# Patient Record
Sex: FEMALE | Race: WHITE | Employment: OTHER | URBAN - NONMETROPOLITAN AREA
[De-identification: names, ages, dates, MRNs, and addresses within clinical notes are randomized per-mention and may not be internally consistent; named-entity substitution may affect disease eponyms.]

---

## 2017-01-04 ENCOUNTER — COMMUNICATION - GICH (OUTPATIENT)
Dept: FAMILY MEDICINE | Facility: OTHER | Age: 62
End: 2017-01-04

## 2017-01-04 DIAGNOSIS — E11.9 TYPE 2 DIABETES MELLITUS WITHOUT COMPLICATIONS (H): ICD-10-CM

## 2017-01-05 ENCOUNTER — OFFICE VISIT - GICH (OUTPATIENT)
Dept: FAMILY MEDICINE | Facility: OTHER | Age: 62
End: 2017-01-05

## 2017-01-05 ENCOUNTER — HISTORY (OUTPATIENT)
Dept: FAMILY MEDICINE | Facility: OTHER | Age: 62
End: 2017-01-05

## 2017-01-05 DIAGNOSIS — J45.20 MILD INTERMITTENT ASTHMA, UNCOMPLICATED: ICD-10-CM

## 2017-01-05 DIAGNOSIS — G89.29 OTHER CHRONIC PAIN: ICD-10-CM

## 2017-01-05 DIAGNOSIS — M89.9 DISORDER OF BONE: ICD-10-CM

## 2017-01-05 DIAGNOSIS — E01.8 OTHER IODINE-DEFICIENCY RELATED THYROID DISORDERS AND ALLIED CONDITIONS: ICD-10-CM

## 2017-01-05 DIAGNOSIS — M25.552 PAIN IN LEFT HIP: ICD-10-CM

## 2017-01-05 DIAGNOSIS — S72.002P: ICD-10-CM

## 2017-01-05 DIAGNOSIS — E78.00 PURE HYPERCHOLESTEROLEMIA: ICD-10-CM

## 2017-01-05 DIAGNOSIS — Z86.79 PERSONAL HISTORY OF OTHER DISEASES OF THE CIRCULATORY SYSTEM (CODE): ICD-10-CM

## 2017-01-05 DIAGNOSIS — M94.9 DISORDER OF CARTILAGE: ICD-10-CM

## 2017-01-05 DIAGNOSIS — Z91.09 OTHER ALLERGY STATUS, OTHER THAN TO DRUGS AND BIOLOGICAL SUBSTANCES: ICD-10-CM

## 2017-01-05 DIAGNOSIS — E11.9 TYPE 2 DIABETES MELLITUS WITHOUT COMPLICATIONS (H): ICD-10-CM

## 2017-01-05 ASSESSMENT — PATIENT HEALTH QUESTIONNAIRE - PHQ9: SUM OF ALL RESPONSES TO PHQ QUESTIONS 1-9: 2

## 2017-05-03 ENCOUNTER — COMMUNICATION - GICH (OUTPATIENT)
Dept: FAMILY MEDICINE | Facility: OTHER | Age: 62
End: 2017-05-03

## 2017-06-21 ENCOUNTER — COMMUNICATION - GICH (OUTPATIENT)
Dept: FAMILY MEDICINE | Facility: OTHER | Age: 62
End: 2017-06-21

## 2017-07-01 ENCOUNTER — COMMUNICATION - GICH (OUTPATIENT)
Dept: FAMILY MEDICINE | Facility: OTHER | Age: 62
End: 2017-07-01

## 2017-07-03 ENCOUNTER — COMMUNICATION - GICH (OUTPATIENT)
Dept: FAMILY MEDICINE | Facility: OTHER | Age: 62
End: 2017-07-03

## 2017-07-03 DIAGNOSIS — E11.9 TYPE 2 DIABETES MELLITUS WITHOUT COMPLICATIONS (H): ICD-10-CM

## 2017-07-12 ENCOUNTER — COMMUNICATION - GICH (OUTPATIENT)
Dept: LAB | Facility: OTHER | Age: 62
End: 2017-07-12

## 2017-07-12 DIAGNOSIS — E11.9 TYPE 2 DIABETES MELLITUS WITHOUT COMPLICATIONS (H): ICD-10-CM

## 2017-07-12 DIAGNOSIS — E78.00 PURE HYPERCHOLESTEROLEMIA: ICD-10-CM

## 2017-07-12 DIAGNOSIS — E01.8 OTHER IODINE-DEFICIENCY RELATED THYROID DISORDERS AND ALLIED CONDITIONS: ICD-10-CM

## 2017-08-08 ENCOUNTER — AMBULATORY - GICH (OUTPATIENT)
Dept: SCHEDULING | Facility: OTHER | Age: 62
End: 2017-08-08

## 2017-08-18 ENCOUNTER — COMMUNICATION - GICH (OUTPATIENT)
Dept: FAMILY MEDICINE | Facility: OTHER | Age: 62
End: 2017-08-18

## 2017-08-18 DIAGNOSIS — E11.9 TYPE 2 DIABETES MELLITUS WITHOUT COMPLICATIONS (H): ICD-10-CM

## 2017-09-06 ENCOUNTER — COMMUNICATION - GICH (OUTPATIENT)
Dept: SURGERY | Facility: OTHER | Age: 62
End: 2017-09-06

## 2017-10-04 ENCOUNTER — COMMUNICATION - GICH (OUTPATIENT)
Dept: FAMILY MEDICINE | Facility: OTHER | Age: 62
End: 2017-10-04

## 2017-10-04 DIAGNOSIS — E11.9 TYPE 2 DIABETES MELLITUS WITHOUT COMPLICATIONS (H): ICD-10-CM

## 2017-10-05 ENCOUNTER — COMMUNICATION - GICH (OUTPATIENT)
Dept: FAMILY MEDICINE | Facility: OTHER | Age: 62
End: 2017-10-05

## 2017-10-05 DIAGNOSIS — R52 PAIN: ICD-10-CM

## 2017-10-31 ENCOUNTER — AMBULATORY - GICH (OUTPATIENT)
Dept: SCHEDULING | Facility: OTHER | Age: 62
End: 2017-10-31

## 2017-10-31 ENCOUNTER — HOSPITAL ENCOUNTER (OUTPATIENT)
Dept: LAB | Facility: OTHER | Age: 62
End: 2017-10-31

## 2017-11-21 ENCOUNTER — COMMUNICATION - GICH (OUTPATIENT)
Dept: FAMILY MEDICINE | Facility: OTHER | Age: 62
End: 2017-11-21

## 2017-11-21 DIAGNOSIS — E11.9 TYPE 2 DIABETES MELLITUS WITHOUT COMPLICATIONS (H): ICD-10-CM

## 2017-12-12 ENCOUNTER — HOSPITAL ENCOUNTER (OUTPATIENT)
Dept: LAB | Facility: OTHER | Age: 62
End: 2017-12-12

## 2017-12-12 ENCOUNTER — TRANSFERRED RECORDS (OUTPATIENT)
Dept: HEALTH INFORMATION MANAGEMENT | Facility: OTHER | Age: 62
End: 2017-12-12

## 2017-12-28 NOTE — TELEPHONE ENCOUNTER
Patient Information     Patient Name MRN Narda Cotton [id number] Female 1955      Telephone Encounter by Eliza Finn RN at 2017  8:49 AM     Author:  Eliza Finn RN Service:  (none) Author Type:  NURS- Registered Nurse     Filed:  2017  9:30 AM Encounter Date:  2017 Status:  Signed     :  Eliza Finn RN (NURS- Registered Nurse)            Pioglitazone 30mg, Take 1 tablet by mouth once daily.    Office visit in the past 12 months or per provider note.    Lab test requirements:  HgbA1c annually or per provider note.  HEMOGLOBIN A1C MONITORING (POCT)    Date Value   2016 8.5 % (H)   2013 9.9 % NGSP (H)       Patient was to follow up for diabetic check and A1C three months after office visit per notes from last exam on 17.  Last A1C 8.5 on 16. No future appointment scheduled at this time. Reminder letter sent 5-3-17. Patient had two follow up appointments scheduled in July which she cancelled.Does not have insurance and cost is a concern. Forwarded refill request to Mac Major MD for consideration.     Unable to complete prescription refill per RN Medication Refill Policy.................... Eliza Finn RN ....................  2017   9:06 AM      Hydroxyzine HCL 25mg, Take 1 tablet by mouth every 6 hours as needed for itching.    Office visit in the past 12 months.    Last visit with MAC MAJOR was on: 2017 in [facility]    Max refills 12 months from last office visit. Prescription refilled per RN Medication Refill Policy.................... Eliza Finn RN ....................  2017   9:19 AM
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
## 2017-12-28 NOTE — TELEPHONE ENCOUNTER
Patient Information     Patient Name Narda Marques [id number] Female 1955      Telephone Encounter by Mayela Strong at 7/3/2017  4:02 PM     Author:  Mayela Strong Service:  (none) Author Type:  (none)     Filed:  7/3/2017  4:10 PM Encounter Date:  2017 Status:  Signed     :  Mayela Strong            Patient notified of below information, Which upset the Patient who said this is bull shit & she is done with this clinic. She is going to cancel her appointment with Elsie Bunch MD.  Left clinic upset.   Mayela Strong LPN ..........7/3/2017 4:08 PM

## 2017-12-28 NOTE — TELEPHONE ENCOUNTER
Patient Information     Patient Name MRN Narda Cotton [id number] Female 1955      Telephone Encounter by Joellen Al at 2017  8:20 AM     Author:  Joellen Al Service:  (none) Author Type:  (none)     Filed:  2017  8:21 AM Encounter Date:  2017 Status:  Signed     :  Joellen Al            Patient is coming 17 for labs. Please place orders.

## 2017-12-28 NOTE — TELEPHONE ENCOUNTER
Patient Information     Patient Name MRN Narda Cotton [id number] Female 1955      Telephone Encounter by Dolly Holcomb RN at 10/6/2017  9:46 AM     Author:  Dolly Holcomb RN Service:  (none) Author Type:  NURS- Registered Nurse     Filed:  10/6/2017  9:53 AM Encounter Date:  10/5/2017 Status:  Signed     :  Dolly Holcomb RN (NURS- Registered Nurse)            This is a Refill request from: [facility]  Name of Medication: Gabapentin 400 mg   Quantity requested: 240  Last fill date: unable to locate this medication in current and or in medication history. Patient unavailable via phone to verify this refill request.   Due for refill: yes per pharmacy  Last visit with PCP:  Guero Sainz MD was on: 2017  Controlled Substance Agreement:  na   Diagnosis r/t this medication request: unsure     Unable to complete prescription refill per RN Medication Refill Policy.................... Dolly Holcomb RN ....................  10/6/2017   9:47 AM

## 2017-12-28 NOTE — TELEPHONE ENCOUNTER
Patient Information     Patient Name MRN Sex Narda Caldera [id number] Female 1955      Telephone Encounter by Elsie Bunch MD at 7/3/2017  2:52 PM     Author:  Elsie Bunch MD Service:  (none) Author Type:  Physician     Filed:  7/3/2017  2:53 PM Encounter Date:  2017 Status:  Signed     :  Elsie Bunch MD (Physician)            Medications filled at [facility] need to be refilled at [facility]. I don't have access to those records here. Cannot increase dose as this is a controlled medication. She should either follow up at [facility]. If she has insurance, would recommend follow up with Dr. Sainz to address refill. Elsie Bunch MD

## 2017-12-28 NOTE — TELEPHONE ENCOUNTER
Patient Information     Patient Name MRNarda Pickett [id number] Female 1955      Telephone Encounter by Kellie Rajput RN at 2017 11:58 AM     Author:  Kellie Rajput RN Service:  (none) Author Type:  NURS- Registered Nurse     Filed:  2017 12:01 PM Encounter Date:  2017 Status:  Signed     :  Kellie Rajput RN (NURS- Registered Nurse)            Antihistamines:    Office visit in the past 12 months.    Last visit with MAC MAJOR was on: 2017 in [facility]  Next visit with MAC MAJOR is on: No future appointment listed with this provider  Next visit with Family Practice is on: No future appointment listed in this department    Max refills 12 months from last office visit.    Prescription refilled per RN Medication Refill Policy.................... Kellie Rajput RN ....................  2017   11:59 AM

## 2017-12-28 NOTE — TELEPHONE ENCOUNTER
Patient Information     Patient Name MRN Narda Cotton [id number] Female 1955      Telephone Encounter by Chacha Ghosh RN at 2017  1:57 PM     Author:  Chacha Ghosh RN Service:  (none) Author Type:  NURS- Registered Nurse     Filed:  2017  1:58 PM Encounter Date:  7/3/2017 Status:  Signed     :  Chacha Ghosh RN (NURS- Registered Nurse)            Refilled 7/3/17.  Unable to complete prescription refill per RN Medication Refill Policy.................... CHACHA GHOSH RN ....................  2017   1:58 PM

## 2018-01-02 NOTE — TELEPHONE ENCOUNTER
Patient Information     Patient Name MRN Narda Cotton [id number] Female 1955      Telephone Encounter by Mayda Hinojosa RN at 2017  8:52 AM     Author:  Mayda Hinojosa RN Service:  (none) Author Type:  (none)     Filed:  2017  8:55 AM Encounter Date:  2017 Status:  Signed     :  Mayda Hinojosa RN (NURS- Registered Nurse)              Patient is being seen today by Mac Major MD. Message forwarded to him to discuss with patient at appointment.    Thiazolidinediones    Office visit in the past 12 months or per provider note.    Last visit with MAC MAJOR was on: 2016 in [facility]  Next visit with MAC MAJOR is on: 2017 in [facility]  Next visit with Family Practice is on: 2017 in [facility]    Lab test requirements:  HgbA1c annually or per provider note.  HEMOGLOBIN A1C MONITORING (POCT)    Date Value   2016 8.5 % (H)   2013 9.9 % NGSP (H)     HEMOGLOBIN A1C GIH (%)    Date Value   2012 9.3 (H)       Max refill for 12 months from last office visit or per provider note.    Patient is being seen today by Mac Major MD. Message forwarded to him to discuss with patient at appointment.  Mayda Hinojosa RN............. 2017 8:54 AM

## 2018-01-02 NOTE — PROGRESS NOTES
Patient Information     Patient Name Narda Marques [id number] Female 1955      Progress Notes by Guero Sainz MD at 2017 10:30 AM     Author:  Guero Sainz MD Service:  (none) Author Type:  Physician     Filed:  2017 12:33 PM Encounter Date:  2017 Status:  Signed     :  Guero Sainz MD (Physician)            SUBJECTIVE:  61 y.o. female who presents for medication refills. She has been quite unhappy and disgruntled recently which she states is because she is  from her  with a likely divorce coming soon. She has been very financially strapped and still states that she no longer has insurance coverage. She is hoping to get coverage for medications. She needs some all refilled.    She has significant pain in the left hip. She will be seeing her orthopedic surgeon and is considering applying for Social Security disability. The pain has not improved since she had the surgery 2 years ago. She states that she was told she should have a lift in her shoe, and is planning to talk to him about that.    Her medications all remain the same. She had laboratory studies done last month which we reviewed. She takes her blood sugars just occasionally, but says it's oftentimes around 110.    Additional Review of Systems: see HPI:   Denies any cardiopulmonary or neurologic symptoms.    Past Medical History      Diagnosis   Date     Atopic dermatitis  2013     Benign paroxysmal positional vertigo  3/21/2013     CLAUDICATION  2012     DIABETES MELLITUS, TYPE II       DISC DISEASE, LUMBAR       DYSPNEA ON EXERTION  2012     Femoral neck fracture (HC)  2015     H/O adenomatous polyp of colon       HASHIMOTO'S THYROIDITIS       HEARING LOSS, BILATERAL       Hip fracture (HC)  2015     Hx of pregnancy       G4, P3, A questionable 1, and 1 stillborn of a twin      HYPERCHOLESTEROLEMIA       Hyperplastic colonic polyp        History of  multiple hyperplastic colon polyps.      HYPERTENSION, HX OF       HYPOTHYROIDISM, POST-RADIOACTIVE IODINE       MENOPAUSAL SYNDROME       NEPHROLITHIASIS, RECURRENT  1/22/2011     OSTEOPENIA       Pregnancy and infectious disease       G4, P3, A questionable 1, and 1 stillborn of a twin      SHOULDER IMPINGEMENT SYNDROME, RIGHT  9/15/2010     TOBACCO ABUSE, HX OF       Tubular adenoma       History of tubular adenoma with low grade colon polyp at the hepatic flexure and at 22 cm.; sessile serrated adenoma with low grade dysplasia colon polyp at the splenic flexure.  History of multiple hyperplastic colon polyps.      Tubular adenoma of colon        with low grade colon polyp at the hepatic flexure and at 22 cm.; sessile serrated adenoma with low grade dysplasia colon polyp at the splenic flexure.          Current Outpatient Prescriptions       Medication  Sig Dispense Refill     albuterol HFA (PROAIR HFA) 90 mcg/actuation inhaler Inhale 2 Puffs by mouth every 4 hours if needed. 1 Inhaler 9     amLODIPine (NORVASC) 5 mg tablet Take 1 tablet by mouth once daily. 90 tablet 4     atenolol (TENORMIN) 100 mg tablet Take 1 tablet by mouth 2 times daily. 180 tablet 4     blood sugar diagnostic (ONETOUCH ULTRA TEST) strip Dispense test strips covered by the patient's insurance.  Test 2 times per day, alternating times.  ICD-10 code E11.9 100 Each 11     blood-glucose meter (ONE TOUCH ULTRAMINI) Dispense glucose meter, test strips and lancets covered by the patient insurance. Dx 250.00. Test 1 times per day. 1 Device 0     calcium carbonate-vitamin D3, 500 mg-400 units, (OYSTER SHELL CALCIUM-VIT D3) tablet 1 tablet 2 times daily.  0     cholecalciferol (VITAMIN D3) 1,000 unit capsule Take 2,000 Units by mouth.       desoximetasone 0.25% topical (TOPICORT) 0.25 % cream Apply  topically to affected area(s) 2 times daily. 1 Tube 3     desoximetasone 0.25% topical (TOPICORT) 0.25 % cream APPLY TOPICALLY TO AFFECTED AREA(S) 2  TIMES DAILY. APPLY AS NEEDED FOR ITCHING 60 g 3     glipiZIDE (GLUCOTROL) 10 mg tablet 1 tablet 2 times daily before meals. 180 tablet 4     hydrOXYzine HCl (ATARAX) 25 mg tablet Take 1 tablet by mouth every 6 hours if needed for Itching. 100 tablet 3     lancets (ONE TOUCH DELICA) [facility] As directed. Test 2 times per day, alternating times.  ICD-10 code E11.9 100 Each 11     lancets Test 2 times per day and dispose as directed. 100 Each 8     levothyroxine (SYNTHROID) 150 mcg tablet Take 1 tablet by mouth once daily. 90 tablet 4     lisinopril-hydrochlorothiazide, 20-25 mg, (PRINZIDE, ZESTORETIC) 20-25 mg per tablet Take 1 tablet by mouth once daily. 90 tablet 4     lovastatin (MEVACOR) 40 mg tablet Take 1 tablet by mouth at bedtime. 90 tablet 4     metFORMIN (GLUCOPHAGE) 1,000 mg tablet Take 1 tablet by mouth 2 times daily with meals. 180 tablet 4     omega-3 fatty acids-vitamin E (FISH OIL) 1,000 mg cap Take 1 capsule by mouth once daily.  0     pioglitazone (ACTOS) 30 mg tablet Take 1 tablet by mouth once daily. 90 tablet 4     Walker 4 Wheeled Walker with Seat and Brakes for home use. For 52 weeks. 1 Device 0     No current facility-administered medications for this visit.      Medications have been reviewed by me and are current to the best of my knowledge and ability.      Allergies as of 01/05/2017 - Ferdinand as Reviewed 01/05/2017      Allergen  Reaction Noted     Oxycodone Itching and Nausea And Vomiting 11/25/2016     Zocor [simvastatin] GI Upset 10/06/2014     Codeine Hives 08/24/2012     Erythromycin Vomiting 08/24/2012     Lipitor [atorvastatin] GI Upset 10/06/2014     Penicillin g benzathin,procain Itching 08/24/2012     Pravastatin Edema 11/25/2016     Procaine *Unknown 10/20/2015     Sulfa (sulfonamide antibiotics) Edema 08/24/2012     Unlisted allergen (include detail in comments) Other - Describe In Comment Field 10/03/2012        OBJECTIVE:  Visit Vitals       /80     Pulse 80     Ht  "1.676 m (5' 6\")     Wt 83.9 kg (185 lb)     BMI 29.86 kg/m2     EXAM:  General Appearance: She is pleasant and alert, somewhat disgruntled because of her marital and financial situation, but in no apparent distress.  Neck Exam: Supple, no masses or nodes., Good carotid pulses, no bruits heard  Thyroid Exam: No nodules or enlargement.  Chest/Respiratory Exam: Normal chest wall and respirations. Clear to auscultation.  Breast Exam: Normal in appearance, palpably free of masses. No skin dimpling or nipple changes, axilla negative.  Cardiovascular Exam: Regular rate and rhythm. S1, S2, no murmur, click, gallop, or rubs.  No peripheral edema.  Gastrointestinal Exam: Soft, nontender, no abnormal masses or organomegaly.  Left hip exam reveals tenderness diffusely around the hip. Incision is well-healed. She walks with a cane and limps, favoring her left hip.  Foot Exam: Normal pulses, sensation intact.  Skin: no rash or abnormalities  Neurologic Exam: Intact and nonfocal  Psychiatric Exam: Alert and oriented, appropriate affect.  Somewhat angry  laboratory studies that were done last month were reviewed and were all essentially normal with the exception of her A1c and elevated triglycerides    ASSESSMENT/PLAN:  Diabetes type 2 with inadequate control. Patient is unsure whether or not she can afford her medications but thinks she will have coverage. Actos was increased to 30 mg daily and I recommended follow-up A1c in 3 months.    Hypothyroidism-euthyroid by exam and lab work.    Hypertension with good control    Multiple other problems which appears stable.    Chronic hip pain-she will be seeing her orthopedic surgeon.    Plan: Refilled meds. Reviewed labs. Increase Actos as noted. A1c in 3 months. Encouraged a mammogram as she has a positive family history of breast cancer. She states she could not afford it and so she was referred to [facility].  Guero Sainz MD ....................  1/5/2017   12:33 " PM

## 2018-01-03 ENCOUNTER — COMMUNICATION - GICH (OUTPATIENT)
Dept: FAMILY MEDICINE | Facility: OTHER | Age: 63
End: 2018-01-03

## 2018-01-03 DIAGNOSIS — E11.9 TYPE 2 DIABETES MELLITUS WITHOUT COMPLICATIONS (H): ICD-10-CM

## 2018-01-10 ENCOUNTER — COMMUNICATION - GICH (OUTPATIENT)
Dept: FAMILY MEDICINE | Facility: OTHER | Age: 63
End: 2018-01-10

## 2018-01-10 DIAGNOSIS — E11.9 TYPE 2 DIABETES MELLITUS WITHOUT COMPLICATIONS (H): ICD-10-CM

## 2018-01-10 DIAGNOSIS — Z86.79 PERSONAL HISTORY OF OTHER DISEASES OF THE CIRCULATORY SYSTEM (CODE): ICD-10-CM

## 2018-01-12 ENCOUNTER — COMMUNICATION - GICH (OUTPATIENT)
Dept: FAMILY MEDICINE | Facility: OTHER | Age: 63
End: 2018-01-12

## 2018-01-12 DIAGNOSIS — Z86.79 PERSONAL HISTORY OF OTHER DISEASES OF THE CIRCULATORY SYSTEM (CODE): ICD-10-CM

## 2018-01-24 ENCOUNTER — COMMUNICATION - GICH (OUTPATIENT)
Dept: FAMILY MEDICINE | Facility: OTHER | Age: 63
End: 2018-01-24

## 2018-01-24 DIAGNOSIS — E11.9 TYPE 2 DIABETES MELLITUS WITHOUT COMPLICATIONS (H): ICD-10-CM

## 2018-01-24 DIAGNOSIS — Z86.79 PERSONAL HISTORY OF OTHER DISEASES OF THE CIRCULATORY SYSTEM (CODE): ICD-10-CM

## 2018-01-26 VITALS
HEART RATE: 80 BPM | HEIGHT: 66 IN | WEIGHT: 185 LBS | BODY MASS INDEX: 29.73 KG/M2 | SYSTOLIC BLOOD PRESSURE: 126 MMHG | DIASTOLIC BLOOD PRESSURE: 80 MMHG

## 2018-01-30 ENCOUNTER — COMMUNICATION - GICH (OUTPATIENT)
Dept: FAMILY MEDICINE | Facility: OTHER | Age: 63
End: 2018-01-30

## 2018-01-30 DIAGNOSIS — R52 PAIN: ICD-10-CM

## 2018-01-31 ASSESSMENT — PATIENT HEALTH QUESTIONNAIRE - PHQ9: SUM OF ALL RESPONSES TO PHQ QUESTIONS 1-9: 2

## 2018-02-05 ENCOUNTER — DOCUMENTATION ONLY (OUTPATIENT)
Dept: FAMILY MEDICINE | Facility: OTHER | Age: 63
End: 2018-02-05

## 2018-02-05 PROBLEM — E01.8 IODINE HYPOTHYROIDISM: Status: ACTIVE | Noted: 2018-02-05

## 2018-02-05 PROBLEM — H91.90 HEARING LOSS: Status: ACTIVE | Noted: 2018-02-05

## 2018-02-05 PROBLEM — M94.9 DISORDER OF BONE AND CARTILAGE: Status: ACTIVE | Noted: 2018-02-05

## 2018-02-05 PROBLEM — E11.9 DIABETES MELLITUS, TYPE II (H): Status: ACTIVE | Noted: 2018-02-05

## 2018-02-05 PROBLEM — M89.9 DISORDER OF BONE AND CARTILAGE: Status: ACTIVE | Noted: 2018-02-05

## 2018-02-05 RX ORDER — ALBUTEROL SULFATE 90 UG/1
2 AEROSOL, METERED RESPIRATORY (INHALATION) EVERY 6 HOURS PRN
COMMUNITY
Start: 2017-01-05

## 2018-02-05 RX ORDER — AMLODIPINE BESYLATE 5 MG/1
5 TABLET ORAL DAILY
COMMUNITY
Start: 2017-01-05 | End: 2018-03-30

## 2018-02-05 RX ORDER — HYDROXYZINE HYDROCHLORIDE 25 MG/1
25 TABLET, FILM COATED ORAL EVERY 6 HOURS PRN
COMMUNITY
Start: 2017-11-22 | End: 2018-08-03

## 2018-02-05 RX ORDER — CHLORAL HYDRATE 500 MG
1 CAPSULE ORAL DAILY
COMMUNITY
Start: 2015-05-12

## 2018-02-05 RX ORDER — BLOOD-GLUCOSE METER
KIT MISCELLANEOUS
COMMUNITY
Start: 2016-02-22 | End: 2019-03-13

## 2018-02-05 RX ORDER — LISINOPRIL AND HYDROCHLOROTHIAZIDE 20; 25 MG/1; MG/1
1 TABLET ORAL DAILY
COMMUNITY
Start: 2017-01-05 | End: 2018-04-26

## 2018-02-05 RX ORDER — PIOGLITAZONEHYDROCHLORIDE 30 MG/1
30 TABLET ORAL DAILY
COMMUNITY
Start: 2017-08-22 | End: 2018-03-30

## 2018-02-05 RX ORDER — DESOXIMETASONE 2.5 MG/G
CREAM TOPICAL
COMMUNITY
Start: 2017-01-05 | End: 2018-10-17

## 2018-02-05 RX ORDER — DESOXIMETASONE 2.5 MG/G
CREAM TOPICAL
COMMUNITY
Start: 2016-02-23 | End: 2018-04-18

## 2018-02-05 RX ORDER — LOVASTATIN 40 MG
40 TABLET ORAL AT BEDTIME
COMMUNITY
Start: 2017-01-05 | End: 2018-03-30

## 2018-02-05 RX ORDER — BLOOD-GLUCOSE METER
EACH MISCELLANEOUS
COMMUNITY
Start: 2015-04-28 | End: 2019-03-13

## 2018-02-05 RX ORDER — ATENOLOL 100 MG/1
100 TABLET ORAL 2 TIMES DAILY
COMMUNITY
Start: 2017-01-05 | End: 2018-04-26

## 2018-02-05 RX ORDER — LEVOTHYROXINE SODIUM 150 UG/1
150 TABLET ORAL DAILY
COMMUNITY
Start: 2017-01-05 | End: 2018-10-17

## 2018-02-05 RX ORDER — GABAPENTIN 400 MG/1
CAPSULE ORAL
COMMUNITY
Start: 2017-10-06 | End: 2018-06-13

## 2018-02-05 RX ORDER — GLIPIZIDE 10 MG/1
10 TABLET ORAL
COMMUNITY
Start: 2017-01-05 | End: 2018-03-30

## 2018-02-06 ENCOUNTER — TRANSFERRED RECORDS (OUTPATIENT)
Dept: HEALTH INFORMATION MANAGEMENT | Facility: OTHER | Age: 63
End: 2018-02-06

## 2018-02-08 ENCOUNTER — COMMUNICATION - GICH (OUTPATIENT)
Dept: FAMILY MEDICINE | Facility: OTHER | Age: 63
End: 2018-02-08

## 2018-02-08 ENCOUNTER — AMBULATORY - GICH (OUTPATIENT)
Dept: FAMILY MEDICINE | Facility: OTHER | Age: 63
End: 2018-02-08

## 2018-02-08 DIAGNOSIS — E01.8 OTHER IODINE-DEFICIENCY RELATED THYROID DISORDERS AND ALLIED CONDITIONS: ICD-10-CM

## 2018-02-09 ENCOUNTER — AMBULATORY - GICH (OUTPATIENT)
Dept: SCHEDULING | Facility: OTHER | Age: 63
End: 2018-02-09

## 2018-02-12 NOTE — TELEPHONE ENCOUNTER
Patient Information     Patient Name MRN Narda Cotton [id number] Female 1955      Telephone Encounter by Kellie Rajput RN at 2018  2:52 PM     Author:  Kellie Rajput RN Service:  (none) Author Type:  NURS- Registered Nurse     Filed:  2018  2:54 PM Encounter Date:  2018 Status:  Signed     :  Kellie Rajput RN (NURS- Registered Nurse)            Beta Blockers     Office visit in the past 12 months or per provider note.    Last visit with MAC MAJOR was on: 2017 in [facility]  Next visit with MAC MAJOR is on: No future appointment listed with this provider  Next visit with Family Practice is on: No future appointment listed in this department    Max refill for 12 months from last office visit or per provider note.    Patient is due for medication management appointment. Limited refill provided at this time. [employer] message and/or letter sent for reminder to patient. Prescription refilled per RN Medication Refill Policy.................... Kellie Rajput RN ....................  2018   2:53 PM

## 2018-02-12 NOTE — TELEPHONE ENCOUNTER
Patient Information     Patient Name MRN Sex Narda Caldera [id number] Female 1955      Telephone Encounter by Eliza Sorto RN at 2018  9:32 AM     Author:  Eliza Sorto RN Service:  (none) Author Type:  NURS- Registered Nurse     Filed:  2018  9:48 AM Encounter Date:  1/10/2018 Status:  Signed     :  Eliza Sorto RN (NURS- Registered Nurse)            PLEASE REVIEW, SIGN AND SEND AS APPROPRIATE: THANK YOU.    Patient was due for annual physical with PCP on 2018. Patient was due for A1C on 10/12/2017.    HEMOGLOBIN A1C MONITORING (POCT)    Date Value   2016 8.5 % (H)   2013 9.9 % NGSP (H)     Spoke with Patient, after verifying last name and . Patient notified of this information. Patient states [facility] and she has jose rafael seeing Carrie Chavez in the [facility]. Patient statse she is up to date on her A1C and cannot afford to come in for her annual physical.     Patient is requesting a refill of her amlodipine and glipizide. Patient requested this be sent to Guero Major MD for consideration.    This is a Refill request from: [facility]  Name of Medication: amlodipine (NORVASC) 5mg tablet  Quantity requested: 90 tablets  Last fill date: 2017 (#90, R-4)  Due for refill: 2018  Last visit with GUERO MAJOR was on: 2017 in [place] [facility]  PCP:  Guero Major MD  Controlled Substance Agreement:  N/a   Diagnosis r/t this medication request: History of cardiovascular disorder    This is a Refill request from: [facility]  Name of Medication: glipizide (GLUCOTROL) 10 mg tablet  Quantity requested: 180 tablets  Last fill date: 2017 (#180, R-4)  Due for refill: 2018   Last visit with GUERO MAJOR was on: 2017 in [place] [facility]  PCP:  Guero Major MD  Controlled Substance Agreement:  N/a   Diagnosis r/t this  medication request: DM without complication     Unable to complete prescription refill per RN Medication Refill Policy.................... Eliza Sorto RN ....................  1/12/2018   9:45 AM

## 2018-02-12 NOTE — TELEPHONE ENCOUNTER
Patient Information     Patient Name MRNarda Pickett [id number] Female 1955      Telephone Encounter by Kellie Rajput RN at 2018  8:21 AM     Author:  Kellie Rajput RN Service:  (none) Author Type:  NURS- Registered Nurse     Filed:  2018  8:22 AM Encounter Date:  1/3/2018 Status:  Signed     :  Kellie Rajput RN (NURS- Registered Nurse)            Filled 2018 per pharmacy note  Unable to complete prescription refill per RN Medication Refill Policy.................... Kellie Rajput RN ....................  2018   8:22 AM

## 2018-02-13 NOTE — TELEPHONE ENCOUNTER
Patient Information     Patient Name MRN Sex Narda Caldera [id number] Female 1955      Telephone Encounter by Marni Jacome RN at 2018  3:04 PM     Author:  Marni Jacome RN Service:  (none) Author Type:  NURS- Registered Nurse     Filed:  2018  3:29 PM Encounter Date:  2018 Status:  Signed     :  Marni Jacome RN (NURS- Registered Nurse)            Hypothyroidism    Office visit in the past 12 months or per provider note.    Last visit with MAC MAJOR was on: 2017 in [facility]  Next visit with MAC MAJOR is on: No future appointment listed with this provider  Next visit with Family Practice is on: No future appointment listed in this department    Lab testing requirements:  TSH annually  TSH (uIU/mL)    Date Value   2016 1.66       Max refill for 12 months from last office visit or per provider note.    Per letter from Dr. Major, patient receives care through [facility] clinic and he will refill her medications as long as we get lab results. Called patient and she states she sent a letter with the phone # to get results from [facility], she stated she had a TSH recently.  TSH was  - 0.90.  Prescription refilled per RN Medication Refill Policy.................... MARNI JACOME RN ....................  2018   3:24 PM

## 2018-02-13 NOTE — TELEPHONE ENCOUNTER
Patient Information     Patient Name MRN Narda Cotton [id number] Female 1955      Telephone Encounter by Elizabeth Forbes RN at 2018  3:21 PM     Author:  Elizabeth Forbes RN Service:  (none) Author Type:  NURS- Registered Nurse     Filed:  2018  3:24 PM Encounter Date:  2018 Status:  Signed     :  Elizabeth Frobes RN (NURS- Registered Nurse)            This is a Refill request from: [facility]  Name of Medication: hydrOXYzine HCl (ATARAX) 25 mg tablet  TAKE 1 TABLET BY MOUTH EVERY 6 HOURS IF NEEDED FOR ITCHING  Quantity requested: 100  Last fill date: 17  Due for refill: yes  Last visit with GUERO MAJOR was on: 2017 in [facility]  PCP:  Guero Major MD  Controlled Substance Agreement:  na   Diagnosis r/t this medication request: DM     Unable to complete prescription refill per RN Medication Refill Policy.................... ELIZABETH FORBES RN ....................  2018   3:22 PM    Diuretic Combinations    Office visit in the past 12 months or per provider note.    Last visit with GUERO MAJOR was on: 2017 in [facility]  Next visit with GUERO MAJOR is on: No future appointment listed with this provider  Next visit with Family Practice is on: No future appointment listed in this department    Lab test requirements:  Creatinine and Potassium annually, if ordering lab, order BMP.  CREATININE (mg/dL)    Date Value   2016 0.76     POTASSIUM (mmol/L)    Date Value   2016 4.0       Max refill for 12 months from last office visit or per provider note.    Due for annual exam, letter was sent last week. Limited supply given pending appt being made. Prescription refilled per RN Medication Refill Policy.................... ELIZABETH FORBES RN ....................  2018   3:23 PM

## 2018-02-13 NOTE — TELEPHONE ENCOUNTER
Patient Information     Patient Name MRN Sex Narda Caldera [id number] Female 1955      Telephone Encounter by Kellie Rajput RN at 2018  4:19 PM     Author:  Kellie Rajput RN Service:  (none) Author Type:  NURS- Registered Nurse     Filed:  2018  4:54 PM Encounter Date:  2018 Status:  Signed     :  Kellie Rajput RN (NURS- Registered Nurse)            ,  Pt is requesting a refill of gabapentin.  Pt was last seen 2017.  Pt states she has been utilizing [facility] and will need to until she is eligible for medicare in September.  Ok to fill?  Medication is mane'd up as requested.  Please review and advise or sign if appropriate    This is a Refill request from: [facility]  Name of Medication: Gabapentin  Quantity requested: 240  Last fill date: 10/06/2017  Due for refill: if appropriate  Last visit with GUERO MAJOR was on: 2017 [facility]  PCP:  Guero Major MD  Controlled Substance Agreement:  NA   Diagnosis r/t this medication request: Pain     Unable to complete prescription refill per RN Medication Refill Policy.................... Kellie Rajput RN ....................  2018   4:52 PM

## 2018-02-19 ENCOUNTER — TELEPHONE (OUTPATIENT)
Dept: FAMILY MEDICINE | Facility: OTHER | Age: 63
End: 2018-02-19

## 2018-02-19 NOTE — TELEPHONE ENCOUNTER
Received lab results that were done at the [facility] clinic.  Patient has been getting her care there but has asked if we would refill her meds.  She has been regularly having A1c's checked, every 3-4 months, last one being in October and was 7.2%.  She also had a blood profile and thyroid functions which were adequate.  Medications can be refilled.  MAC MAJOR MD on 2/19/2018 at 2:16 PM

## 2018-02-21 NOTE — PROGRESS NOTES
Patient Information     Patient Name MRN Sex     Narda Godwin [id number] Female 1955      Progress Notes signed by Guero Sainz MD at 2018 11:06 AM      Author:  Guero Sainz MD Service:  (none) Author Type:  Physician     Filed:  2018 11:06 AM Encounter Date:  2018 Status:  Signed     :  Guero Sainz MD (Physician)            2018      NARDA GODWIN    ,        RE: NARDA GODWIN  MR#: [id number]  : 1955        Dear Ms. Godwin:     I am in receipt of your letter regarding your difficulties with the cost of your medical care.  I agree that as long as you are getting care through the free clinic, that will be adequate and I can refill your medications.  I will need to have you tell them to release records to me, including lab work that you have done, as that is important to have blood tests done periodically for diabetes.  Your last test for diabetes here was in 2016 and at that time, it did indicate that your average blood sugar was running almost 200, which was inadequate.    Again, if you can have lab work and notes from the free clinic sent to me, I can continue to fill your medication.    Sincerely yours,      MD ZOHRA CAMARA/ragini  D:2018 16:51:11  T:2018 21:07:50  VJID: [id number]  TJID: [id number]    cc:  Enclosure(s):

## 2018-03-06 ENCOUNTER — RESULTS ONLY (OUTPATIENT)
Dept: LAB | Age: 63
End: 2018-03-06

## 2018-03-06 LAB — HBA1C MFR BLD: 8.4 % (ref 4–6)

## 2018-03-11 ENCOUNTER — HEALTH MAINTENANCE LETTER (OUTPATIENT)
Age: 63
End: 2018-03-11

## 2018-04-06 DIAGNOSIS — I10 ESSENTIAL HYPERTENSION: ICD-10-CM

## 2018-04-06 DIAGNOSIS — E78.00 HYPERCHOLESTEROLEMIA: ICD-10-CM

## 2018-04-06 DIAGNOSIS — E11.9 TYPE 2 DIABETES MELLITUS WITHOUT COMPLICATION, UNSPECIFIED LONG TERM INSULIN USE STATUS: ICD-10-CM

## 2018-04-09 RX ORDER — PIOGLITAZONEHYDROCHLORIDE 30 MG/1
TABLET ORAL
Qty: 90 TABLET | OUTPATIENT
Start: 2018-04-09

## 2018-04-09 RX ORDER — GLIPIZIDE 10 MG/1
TABLET ORAL
Qty: 180 TABLET | OUTPATIENT
Start: 2018-04-09

## 2018-04-09 RX ORDER — LOVASTATIN 40 MG
TABLET ORAL
Qty: 90 TABLET | OUTPATIENT
Start: 2018-04-09

## 2018-04-09 RX ORDER — AMLODIPINE BESYLATE 5 MG/1
TABLET ORAL
Qty: 90 TABLET | OUTPATIENT
Start: 2018-04-09

## 2018-04-09 RX ORDER — ATENOLOL 100 MG/1
TABLET ORAL
Qty: 180 TABLET | OUTPATIENT
Start: 2018-04-09

## 2018-04-09 NOTE — TELEPHONE ENCOUNTER
Medication filled 04/03/2018  Unable to complete prescription refill per RN Medication Refill Policy.................... Kellie Rajput ....................  4/9/2018   10:32 AM

## 2018-04-18 DIAGNOSIS — R21 RASH: Primary | ICD-10-CM

## 2018-04-23 RX ORDER — DESOXIMETASONE 2.5 MG/G
CREAM TOPICAL
Qty: 15 G | Refills: 5 | Status: SHIPPED | OUTPATIENT
Start: 2018-04-23 | End: 2019-02-08

## 2018-04-23 NOTE — TELEPHONE ENCOUNTER
Routing refill request to provider for review/approval because:  Patient needs to be seen because it has been more than 1 year since last office visit.  Please review and advise or sign if appropriate  Kellie Rajput RN.............................4/23/2018 3:27 PM

## 2018-04-26 DIAGNOSIS — I10 BENIGN ESSENTIAL HYPERTENSION: Primary | ICD-10-CM

## 2018-04-26 RX ORDER — LISINOPRIL AND HYDROCHLOROTHIAZIDE 20; 25 MG/1; MG/1
TABLET ORAL
Qty: 90 TABLET | Refills: 3 | Status: SHIPPED | OUTPATIENT
Start: 2018-04-26 | End: 2018-10-17

## 2018-04-26 RX ORDER — ATENOLOL 100 MG/1
TABLET ORAL
Qty: 180 TABLET | Refills: 3 | Status: SHIPPED | OUTPATIENT
Start: 2018-04-26 | End: 2019-03-06

## 2018-05-17 DIAGNOSIS — E11.9 TYPE 2 DIABETES MELLITUS WITHOUT COMPLICATION, UNSPECIFIED LONG TERM INSULIN USE STATUS: ICD-10-CM

## 2018-05-22 RX ORDER — PIOGLITAZONEHYDROCHLORIDE 15 MG/1
TABLET ORAL
Qty: 90 TABLET | OUTPATIENT
Start: 2018-05-22

## 2018-05-22 NOTE — TELEPHONE ENCOUNTER
Medication filled 04/03/2018 #90 R0  Unable to complete prescription refill per RN Medication Refill Policy.................... Kellie Rajput ....................  5/22/2018   11:27 AM

## 2018-06-12 DIAGNOSIS — E11.9 TYPE 2 DIABETES MELLITUS WITHOUT COMPLICATION, UNSPECIFIED LONG TERM INSULIN USE STATUS: ICD-10-CM

## 2018-06-13 DIAGNOSIS — M79.2 NERVE PAIN: Primary | ICD-10-CM

## 2018-06-14 NOTE — TELEPHONE ENCOUNTER
PLEASE REVIEW, SIGN AND SEND AS APPROPRIATE: THANK YOU.    Per letter from Dr. Sainz to Patient, dated 2/8/18, Pt has some difficulty with cost of medical care. Dr. Sainz has agreed, as long as Pt is getting care at [facility], that will be adequate for him to refill her medications. He requires, she have her release records to him, including lab work that you have done, as that is important to have blood tests done periodically for diabetes.      Patient should have enough for 18 more days, running out 7/2/18.    pioglitazone (ACTOS) 15 MG tablet  Last Written Prescription Date:  4/3/18  Last Fill Quantity: 90,   # refills: 0  Last Office Visit: 1/5/17  Future Office visit:   None.    pioglitazone (ACTOS) 30 MG tablet  Last Written Prescription Date:  4/3/18  Last Fill Quantity: 90,   # refills: 0    Routing refill request to provider for review/approval because:  Actos failed [facility] refill protocol, due to:    No BP, LDL, ALT, AST, Microalbumin, or normal serum creatinine in the past 12 months.    No OV in the past 6 months     No A1C within the past 3-6 months.    Unable to complete prescription refill per RN Medication Refill Policy. Eliza Sorto RN .............. 6/14/2018  12:27 PM

## 2018-06-15 ENCOUNTER — TELEPHONE (OUTPATIENT)
Dept: FAMILY MEDICINE | Facility: OTHER | Age: 63
End: 2018-06-15

## 2018-06-15 RX ORDER — PIOGLITAZONEHYDROCHLORIDE 30 MG/1
TABLET ORAL
Qty: 90 TABLET | Refills: 3 | Status: SHIPPED | OUTPATIENT
Start: 2018-06-15 | End: 2019-04-01

## 2018-06-15 RX ORDER — GABAPENTIN 400 MG/1
CAPSULE ORAL
Qty: 240 CAPSULE | Refills: 3 | Status: SHIPPED | OUTPATIENT
Start: 2018-06-15 | End: 2018-10-17

## 2018-06-15 RX ORDER — PIOGLITAZONEHYDROCHLORIDE 15 MG/1
TABLET ORAL
Qty: 90 TABLET | Refills: 3 | Status: SHIPPED | OUTPATIENT
Start: 2018-06-15 | End: 2019-03-06

## 2018-06-15 NOTE — TELEPHONE ENCOUNTER
Requested Prescriptions   Pending Prescriptions Disp Refills     gabapentin (NEURONTIN) 400 MG capsule [Pharmacy Med Name: GABAPENTIN 400 MG CAPSULE] 240 capsule      Sig: TAKE 2 CAPSULES BY MOUTH IN THE MORNING. TAKE 2 AT NOON, AND TAKE 3 AT BEDTIME. (TOTAL OF 7 DAILY)    There is no refill protocol information for this order        [facility]      Last Written Prescription Date:  5/14/2018  Last Fill Quantity: 240,   # refills: 0  Last Office Visit: 1/5/2017  Future Office visit:   none    Routing refill request to provider for review/approval because:  Drug not on the G, P or [facility] refill protocol or controlled substance

## 2018-06-15 NOTE — TELEPHONE ENCOUNTER
Contacted patient to notify them that refill request has been sent to pharmacy.  Generic voicemail:  Message left to return call.    1:49 PM Patient returned call and was notified that requested prescription refills were sent to pharmacy.  Patient was very appreciative.    Catherine Lopez RN  ....................  6/15/2018   10:41 AM

## 2018-07-03 DIAGNOSIS — E11.9 TYPE 2 DIABETES MELLITUS WITHOUT COMPLICATION, UNSPECIFIED LONG TERM INSULIN USE STATUS: ICD-10-CM

## 2018-07-05 RX ORDER — HYDROXYZINE HYDROCHLORIDE 25 MG/1
TABLET, FILM COATED ORAL
Qty: 100 TABLET | OUTPATIENT
Start: 2018-07-05

## 2018-07-05 NOTE — TELEPHONE ENCOUNTER
Metformin filled.  Labs scanned in to chart per project care and agreement with  per letter 02/18/2018.    Kellie Rajput RN.............................7/5/2018 4:07 PM      hydroxyzine filled 07/03/2018 #100 per pharmacy note  Unable to complete prescription refill per RN Medication Refill Policy.................... Kellie Rajput ....................  7/5/2018   3:52 PM

## 2018-07-06 DIAGNOSIS — E11.9 TYPE 2 DIABETES MELLITUS WITHOUT COMPLICATION, UNSPECIFIED LONG TERM INSULIN USE STATUS: ICD-10-CM

## 2018-07-10 RX ORDER — GLIPIZIDE 10 MG/1
TABLET ORAL
Qty: 180 TABLET | Refills: 3 | Status: SHIPPED | OUTPATIENT
Start: 2018-07-10 | End: 2019-04-01

## 2018-07-10 NOTE — TELEPHONE ENCOUNTER
RN refill protocol failed for Rx as requested from the pharmacy for reasons as noted below:     Blood pressure less than 140/90 in past 6 months    Patient has documented LDL within the past 12 mos.    Patient has had a Microalbumin in the past 12 mos.    Patient has documented A1c within the specified period of time.    Patient has a recent creatinine (normal) within the past 12 mos.    Recent (6 mo) or future (30 days) visit within the authorizing provider's specialty     Writer will mane up and route Rx request to PCP for his consideration/approval at this time. Does note telephone encounter dated 2/19/18 by PCP, however as per RN refill protocol writer cannot fill Rxs as requested.    Unable to complete prescription refill per RN Medication Refill Policy. Darnell Vegas 7/10/2018 9:46 AM

## 2018-07-16 DIAGNOSIS — E78.00 HYPERCHOLESTEROLEMIA: ICD-10-CM

## 2018-07-19 RX ORDER — LOVASTATIN 40 MG
TABLET ORAL
Qty: 90 TABLET | Refills: 1 | Status: SHIPPED | OUTPATIENT
Start: 2018-07-19 | End: 2018-10-17

## 2018-07-19 NOTE — TELEPHONE ENCOUNTER
"Refill request from [facility] for:  lovastatin (MEVACOR) 40 MG tablet    \"Received lab results that were done at the [facility].  Patient has been getting her care there but has asked if we would refill her meds.  She has been regularly having A1c's checked, every 3-4 months, last one being in October and was 7.2%.  She also had a blood profile and thyroid functions which were adequate.  Medications can be refilled.  MAC MAJOR MD on 2/19/2018 at 2:16 PM\"    Requested Prescriptions   Pending Prescriptions Disp Refills     lovastatin (MEVACOR) 40 MG tablet [Pharmacy Med Name: LOVASTATIN 40 MG TABLET] 90 tablet      Sig: TAKE ONE TABLET BY MOUTH NIGHTLY AT BEDTIME    Statins Protocol Failed    7/16/2018  1:30 PM       Failed - LDL on file in past 12 months    Recent Labs   Lab Test  12/01/16   0923   LDL  85            Failed - Recent (12 mo) or future (30 days) visit within the authorizing provider's specialty    Patient had office visit in the last 12 months or has a visit in the next 30 days with authorizing provider or within the authorizing provider's specialty.  See \"Patient Info\" tab in inbasket, or \"Choose Columns\" in Meds & Orders section of the refill encounter.           Passed - No abnormal creatine kinase in past 12 months    No lab results found.            Passed - Patient is age 18 or older       Passed - No active pregnancy on record       Passed - No positive pregnancy test in past 12 months        Catherine Lopez RN  ....................  7/19/2018   11:10 AM      "

## 2018-07-23 NOTE — PROGRESS NOTES
Patient Information     Patient Name  Narda Bennett MRN  [id number] Sex  Female   1955      Letter by Guero Sainz MD at      Author:  Guero Sainz MD Service:  (none) Author Type:  (none)    Filed:   Encounter Date:  5/3/2017 Status:  (Other)           Narda Bennett  [address]  [facility] [address]          May 3, 2017    Dear Ms. Bennett:    This letter is to remind you that you are due for your 3 month diabetic exam with Guero Sainz MD . Your last comprehensive diabetic visit was more than 3 months ago.     HEMOGLOBIN A1C MONITORING (POCT)    Date Value   2016 8.5 % (H)   2013 9.9 % NGSP (H)       Additional refills require a diabetic medication management appointment with Guero Sainz MD. Please call the clinic at [phone] to schedule your appointment.    Please call to inform us if you no longer see Guero Sainz MD for primary care, doing so will remove you from our call/contact list.    Thank you,    The Refill Nurse  [facility]

## 2018-07-23 NOTE — PROGRESS NOTES
Patient Information     Patient Name  Narda Bennett MRN  [id number] Sex  Female   1955      Letter by Guero Sainz MD at      Author:  Guero Sainz MD Service:  (none) Author Type:  (none)    Filed:   Encounter Date:  2018 Status:  (Other)           Narda Bennett  [address]  [facility] [address]          2018    Dear Ms. Bennett:    This letter is to remind you that you are due for your annual exam with Guero Sainz MD. Your last comprehensive visit was more than 12 months ago.    A LIMITED refill of   Orders Placed This Encounter      atenolol (TENORMIN) 100 mg tablet has been called into your pharmacy. Additional refills require you to complete this appointment.    Please call the clinic at [phone] to schedule your appointment.    If you should require additional refills before your scheduled appointment, please contact your pharmacy and we will refill your medication until the date of your appointment.    If you are no longer seeing Guero Sainz MD for primary care, please call to let us know. Doing so will remove you from our call/contact list.      Thank you for choosing [facility] for your health care needs.    Sincerely,    Refill RN  [facility]

## 2018-07-24 NOTE — PROGRESS NOTES
Patient Information     Patient Name  Narda Bennett MRN  [id number] Sex  Female   1955      Letter by Arnaldo Roe MD at      Author:  Arnaldo Roe MD Service:  (none) Author Type:  (none)    Filed:   Encounter Date:  2017 Status:  (Other)           Narda Bennett  [address]  [facility] [address]          2017    Dear Ms. Bennett:    It has come to our attention that you are due for a colonoscopy.  According to our records, your last colonoscopy was done on 10/8/2012.  It  is time for your repeat colonoscopy.  Please contact the Surgery department at [phone] and we will be happy to set up this colonoscopy for you.  If you have had a colonoscopy since your last one with us, please let us know so we can update our records.      Sincerely,       Sherrell WISE Dallam  General Surgery      Reviewed and electronically signed by provider.

## 2018-08-03 DIAGNOSIS — E11.9 DIABETES MELLITUS WITHOUT COMPLICATION (H): Primary | ICD-10-CM

## 2018-08-07 RX ORDER — HYDROXYZINE HYDROCHLORIDE 25 MG/1
TABLET, FILM COATED ORAL
Qty: 100 TABLET | Refills: 1 | Status: SHIPPED | OUTPATIENT
Start: 2018-08-07 | End: 2018-10-17

## 2018-08-07 NOTE — TELEPHONE ENCOUNTER
RN refill protocol failed as patient has not been seen by PCP in the last 12 months. LOV with PCP was on 1/5/17. Writer also notes that PCP has been filling Rxs as requested as per documentation on 2/19/18 telephone encounter that notes that patient has been seen in the Free clinic. PCP is out of the office at this time, returning on 8/9/18. Per Rx request, refill request is urgent. Writer will mane up and route Rx request to PCP's teamlet for her consideration/approval at this time in the absence of PCP.    Unable to complete prescription refill per RN Medication Refill Policy. Darnell Vegas 8/7/2018 2:45 PM

## 2018-08-08 ENCOUNTER — TELEPHONE (OUTPATIENT)
Dept: FAMILY MEDICINE | Facility: OTHER | Age: 63
End: 2018-08-08

## 2018-08-08 NOTE — TELEPHONE ENCOUNTER
Spoke with patient and notified that [employer] isn't wanting to cover her Hydroxyzine anymore. Will call pharmacy and find an alternative and let Kirti Rai NP know. Patient would like a detailed message left on her answering machine does not want to have to try to call back. Mariana Recinos LPN......................8/8/2018 10:52 AM

## 2018-09-28 ENCOUNTER — TELEPHONE (OUTPATIENT)
Dept: FAMILY MEDICINE | Facility: OTHER | Age: 63
End: 2018-09-28

## 2018-09-28 DIAGNOSIS — E01.8 IODINE HYPOTHYROIDISM: Primary | ICD-10-CM

## 2018-09-28 DIAGNOSIS — I10 ESSENTIAL HYPERTENSION: ICD-10-CM

## 2018-09-28 DIAGNOSIS — E11.9 TYPE 2 DIABETES MELLITUS WITHOUT COMPLICATION, WITHOUT LONG-TERM CURRENT USE OF INSULIN (H): ICD-10-CM

## 2018-09-28 DIAGNOSIS — E78.00 HYPERCHOLESTEROLEMIA: ICD-10-CM

## 2018-09-28 NOTE — TELEPHONE ENCOUNTER
Patient will schedule a lab only appt the week of 10/17/18 and see Dr Bunch on 10/17/18.  Transferred to scheduling.  Norma J. Gosselin LPN......  9/28/2018   11:58 AM

## 2018-09-28 NOTE — TELEPHONE ENCOUNTER
Patient would like to learn if they would need any lab work prior to upcoming appointment, which is 10-17-18.

## 2018-10-02 DIAGNOSIS — E11.9 TYPE 2 DIABETES MELLITUS WITHOUT COMPLICATION (H): ICD-10-CM

## 2018-10-04 ENCOUNTER — TELEPHONE (OUTPATIENT)
Dept: FAMILY MEDICINE | Facility: OTHER | Age: 63
End: 2018-10-04

## 2018-10-04 DIAGNOSIS — L29.9 CHRONIC PRURITUS: Primary | ICD-10-CM

## 2018-10-04 NOTE — TELEPHONE ENCOUNTER
WLR-Pt called and stated she needed to talk to someone because her insurance is telling her that something is coded wrong for her Hydroxyzine and they won't cover it. Stated we need to fix something on our end. Would like a call back ASAP. Please call pt and advise.  Malik Gerard on 10/4/2018 at 1:14 PM

## 2018-10-04 NOTE — TELEPHONE ENCOUNTER
Needing questions answered regarding appeal for Hydroxyzine medication. I was unable to answer all of them. They will fax over forms for provider to answer. Case #[id number].     Rebekah Bradley LPN on 10/4/2018 at 11:13 AM

## 2018-10-04 NOTE — TELEPHONE ENCOUNTER
Contacted [employer] and they state that medication hydroxyzine now requires a PA for insurance coverage.  Contacted PA department and they state that the PA is in process and it could take another 1-2 days before it clears as there are several questions yet to be answered but shares that they will run it by another co-worker.  PA department called back and relayed that the request has now been denied and the denial would be sent to Kirti Rai APRN, NP via inter-office mail to see if she would like to appeal or if there is an alternative medication.  As Kirti Rai APRN, NP will be out of the office until 10/9/2018 PA office will send denial to Harsh Llanes MD.  Attempted to contact patient.  No answer.  Unable to leave message as there was no voicemail box.  Will route as an FYI to Dr. Llanes.    Catherine Lopez RN  ....................  10/4/2018   1:52 PM

## 2018-10-05 RX ORDER — LISINOPRIL AND HYDROCHLOROTHIAZIDE 20; 25 MG/1; MG/1
1 TABLET ORAL
COMMUNITY
Start: 2018-01-25 | End: 2018-10-17

## 2018-10-05 RX ORDER — GLIPIZIDE 10 MG/1
10 TABLET ORAL
COMMUNITY
Start: 2018-01-12 | End: 2018-10-17

## 2018-10-05 RX ORDER — GABAPENTIN 400 MG/1
CAPSULE ORAL
COMMUNITY
Start: 2018-01-31 | End: 2018-10-17

## 2018-10-05 RX ORDER — LEVOTHYROXINE SODIUM 150 UG/1
150 TABLET ORAL
COMMUNITY
Start: 2018-02-09 | End: 2018-10-17

## 2018-10-05 RX ORDER — HYDROXYZINE HYDROCHLORIDE 25 MG/1
25 TABLET, FILM COATED ORAL
COMMUNITY
Start: 2018-01-25 | End: 2018-10-09

## 2018-10-05 NOTE — TELEPHONE ENCOUNTER
[facility] #728 sent Rx request for the following:     METFORMIN 1000MG TAB  TAKE 1 TABLET BY MOUTH TWICE DAILY WITH MEALS  Last Written Prescription Date:  7/5/18  Last Fill Quantity: 180,   # refills: 0    Biguanide Agents Pkovyw80/5 11:25 AM   Blood pressure less than 140/90 in past 6 months    Patient has documented LDL within the past 12 mos.    Patient has had a Microalbumin in the past 15 mos.    Patient has documented A1c within the specified period of time.   *Labs previously ordered by provider.    Last Office Visit: 1/5/17  Future Office visit:   Next 5 appointments (look out 90 days)     Oct 17, 2018  3:15 PM CDT   Office Visit with Elsie Bunch MD   [facility]    [address]  [place] [id number] [phone]                With upcoming appointment, Prescription approved per [facility] Refill Protocol for 30 days at this time, with note to pharmacy. Eliza Sorto RN .............. 10/5/2018  11:39 AM

## 2018-10-09 NOTE — TELEPHONE ENCOUNTER
Noted diagnosis should be pruritis.  Will attempt to fill with new and more appropriate diagnosis code.    Noted last OV with PCP 1/5/2017    Last ordered 8/7/2018 for 100 X 1 = approx 50 day supply    Unable to reach patient to determine if she has enough medication for upcoming appt 10/17/2018 with  Elsie Bunch MD.    Will add to pharmacy note as patient reminder and route to PCP for refill consideration    Requested Prescriptions   Pending Prescriptions Disp Refills     hydrOXYzine (ATARAX) 25 MG tablet 120 tablet      Sig: Take 1 tablet (25 mg) by mouth every 6 hours as needed for itching    There is no refill protocol information for this order        Unable to complete prescription refill per RN Medication Refill Policy.................... Catherine Lopez ....................  10/9/2018   5:00 PM

## 2018-10-10 ENCOUNTER — TELEPHONE (OUTPATIENT)
Dept: FAMILY MEDICINE | Facility: OTHER | Age: 63
End: 2018-10-10

## 2018-10-10 DIAGNOSIS — E11.9 TYPE 2 DIABETES MELLITUS WITHOUT COMPLICATION, UNSPECIFIED WHETHER LONG TERM INSULIN USE (H): Primary | ICD-10-CM

## 2018-10-10 DIAGNOSIS — E11.9 TYPE 2 DIABETES MELLITUS WITHOUT COMPLICATION (H): ICD-10-CM

## 2018-10-10 RX ORDER — HYDROXYZINE HYDROCHLORIDE 25 MG/1
25 TABLET, FILM COATED ORAL EVERY 6 HOURS PRN
Qty: 120 TABLET | Refills: 3 | Status: SHIPPED | OUTPATIENT
Start: 2018-10-10 | End: 2018-10-17

## 2018-10-10 NOTE — TELEPHONE ENCOUNTER
Prescription for hydroxyzine is going to be denied because of PA,  Case can be requested to be reopened.

## 2018-10-10 NOTE — LETTER
October 12, 2018      Narda Bennett     [facility] [address]        Dear Narda,     We have received your prescription refill request for metformin (Glucophage) from  pharmacy.  In order to continue receiving prescriptions, you must have an annual exam with a primary care provider.      This letter is to remind you that you are due for your annual exam appointment with Dr. JES Major or your primary care provider to continue receiving this prescription.   Your last visit was with Dr. JES Major was on 1-5-2017..      Additional refills of your medication require you to complete this visit.    Please call [phone] to schedule your appointment.    Thank you for choosing [facility] for your health care needs.    Sincerely,      Refill RN  [facility]                Sincerely,        MAC MAJOR MD

## 2018-10-11 NOTE — TELEPHONE ENCOUNTER
PA has been started 10/11/2018. Please be advised that the medication will require additional questions. Nella Merchant LPN....................  10/11/2018   1:17 PM

## 2018-10-11 NOTE — TELEPHONE ENCOUNTER
Start PA for medication for [facility][id number]  [phone]    Can one of the nurses start the PA for this medication--I listed the phone number and the patient's ID number above--I was able to get that information from the pharmacist at [facility]    When the PA is already to go Dr. Sainz will need to sign this and it will need to be faxed----the pharmacist at [facility] believes that this medication probably had a year to year PA that may have run out    Thanks  Kirti Rai, APRN CNP   October 11, 2018

## 2018-10-11 NOTE — TELEPHONE ENCOUNTER
Spoke with patient she said she needs a PA completed in order for her Hydroxyzine to be covered at [facility]. She has been getting this RX for 30 years now. Spoke with Lexus Rosales in PA she said the PA was denied and Kirti Rai, VIRY needs to fill out paperwork to appeal this decision. Mariana Recinos LPN......................10/11/2018 9:12 AM

## 2018-10-12 NOTE — TELEPHONE ENCOUNTER
[facility] pharmacy and pt request a Rx refill for metformin (Glucophage).  Pt's last exam with PCP Dr. JES Sainz was on 1-5-17.  Biguanides Agent protocol failed.  Patient is due for medication management appointment. Letter sent for reminder to patient. Prescription refilled per RN Medication Refill Policy.................... Eliza Greenberg ....................  10/12/2018   4:25 PM  Will route to PCP for review and consideration.  Biguanide Agents Imemgt52/10 10:37 AM   Blood pressure less than 140/90 in past 6 months    Patient has documented LDL within the past 12 mos.    Patient has had a Microalbumin in the past 15 mos.    Patient has documented A1c within the specified period of time

## 2018-10-15 RX ORDER — GABAPENTIN 400 MG/1
CAPSULE ORAL
Qty: 630 CAPSULE | Refills: 0 | OUTPATIENT
Start: 2018-10-15

## 2018-10-15 NOTE — TELEPHONE ENCOUNTER
Refused. Has office visit scheduled on 10-17-18 with Dr. Bunch to establish chare. Overdue for visit, LOV 1-2017. Refill to be addressed then.    Eliza Finn RN on 10/15/2018 at 9:45 AM

## 2018-10-16 PROBLEM — M79.2 NERVE PAIN: Status: ACTIVE | Noted: 2018-10-16

## 2018-10-17 ENCOUNTER — OFFICE VISIT (OUTPATIENT)
Dept: FAMILY MEDICINE | Facility: OTHER | Age: 63
End: 2018-10-17
Attending: FAMILY MEDICINE
Payer: MEDICARE

## 2018-10-17 VITALS
BODY MASS INDEX: 31.6 KG/M2 | SYSTOLIC BLOOD PRESSURE: 128 MMHG | HEART RATE: 60 BPM | WEIGHT: 195.8 LBS | DIASTOLIC BLOOD PRESSURE: 62 MMHG

## 2018-10-17 DIAGNOSIS — L29.9 CHRONIC PRURITUS: ICD-10-CM

## 2018-10-17 DIAGNOSIS — E11.9 TYPE 2 DIABETES MELLITUS WITHOUT COMPLICATION, UNSPECIFIED WHETHER LONG TERM INSULIN USE (H): ICD-10-CM

## 2018-10-17 DIAGNOSIS — E11.42 DIABETIC POLYNEUROPATHY ASSOCIATED WITH TYPE 2 DIABETES MELLITUS (H): Primary | ICD-10-CM

## 2018-10-17 DIAGNOSIS — E03.9 HYPOTHYROIDISM, UNSPECIFIED TYPE: ICD-10-CM

## 2018-10-17 DIAGNOSIS — E01.8 IODINE HYPOTHYROIDISM: ICD-10-CM

## 2018-10-17 DIAGNOSIS — I10 ESSENTIAL HYPERTENSION: ICD-10-CM

## 2018-10-17 DIAGNOSIS — E78.00 HYPERCHOLESTEROLEMIA: ICD-10-CM

## 2018-10-17 DIAGNOSIS — E11.9 TYPE 2 DIABETES MELLITUS WITHOUT COMPLICATION, WITHOUT LONG-TERM CURRENT USE OF INSULIN (H): ICD-10-CM

## 2018-10-17 DIAGNOSIS — I10 BENIGN ESSENTIAL HYPERTENSION: ICD-10-CM

## 2018-10-17 LAB
ALBUMIN SERPL-MCNC: 4.4 G/DL (ref 3.5–5.7)
ALBUMIN UR-MCNC: NEGATIVE MG/DL
ALP SERPL-CCNC: 82 U/L (ref 34–104)
ALT SERPL W P-5'-P-CCNC: 15 U/L (ref 7–52)
ANION GAP SERPL CALCULATED.3IONS-SCNC: 11 MMOL/L (ref 3–14)
APPEARANCE UR: CLEAR
AST SERPL W P-5'-P-CCNC: 14 U/L (ref 13–39)
BACTERIA #/AREA URNS HPF: ABNORMAL /HPF
BASOPHILS # BLD AUTO: 0.1 10E9/L (ref 0–0.2)
BASOPHILS NFR BLD AUTO: 1.2 %
BILIRUB SERPL-MCNC: 0.2 MG/DL (ref 0.3–1)
BILIRUB UR QL STRIP: NEGATIVE
BUN SERPL-MCNC: 26 MG/DL (ref 7–25)
CALCIUM SERPL-MCNC: 10.3 MG/DL (ref 8.6–10.3)
CHLORIDE SERPL-SCNC: 104 MMOL/L (ref 98–107)
CHOLEST SERPL-MCNC: 256 MG/DL
CO2 SERPL-SCNC: 25 MMOL/L (ref 21–31)
COLOR UR AUTO: YELLOW
CREAT SERPL-MCNC: 0.88 MG/DL (ref 0.6–1.2)
DIFFERENTIAL METHOD BLD: ABNORMAL
EOSINOPHIL # BLD AUTO: 0.3 10E9/L (ref 0–0.7)
EOSINOPHIL NFR BLD AUTO: 2.5 %
ERYTHROCYTE [DISTWIDTH] IN BLOOD BY AUTOMATED COUNT: 15.3 % (ref 10–15)
GFR SERPL CREATININE-BSD FRML MDRD: 65 ML/MIN/1.7M2
GLUCOSE SERPL-MCNC: 226 MG/DL (ref 70–105)
GLUCOSE UR STRIP-MCNC: NEGATIVE MG/DL
HBA1C MFR BLD: 7.6 % (ref 4–6)
HCT VFR BLD AUTO: 42.9 % (ref 35–47)
HDLC SERPL-MCNC: 41 MG/DL (ref 23–92)
HGB BLD-MCNC: 13.9 G/DL (ref 11.7–15.7)
HGB UR QL STRIP: NEGATIVE
IMM GRANULOCYTES # BLD: 0.1 10E9/L (ref 0–0.4)
IMM GRANULOCYTES NFR BLD: 0.6 %
KETONES UR STRIP-MCNC: ABNORMAL MG/DL
LDLC SERPL CALC-MCNC: ABNORMAL MG/DL
LEUKOCYTE ESTERASE UR QL STRIP: ABNORMAL
LYMPHOCYTES # BLD AUTO: 3.4 10E9/L (ref 0.8–5.3)
LYMPHOCYTES NFR BLD AUTO: 32.1 %
MCH RBC QN AUTO: 28.8 PG (ref 26.5–33)
MCHC RBC AUTO-ENTMCNC: 32.4 G/DL (ref 31.5–36.5)
MCV RBC AUTO: 89 FL (ref 78–100)
MONOCYTES # BLD AUTO: 1 10E9/L (ref 0–1.3)
MONOCYTES NFR BLD AUTO: 8.9 %
NEUTROPHILS # BLD AUTO: 5.8 10E9/L (ref 1.6–8.3)
NEUTROPHILS NFR BLD AUTO: 54.7 %
NITRATE UR QL: NEGATIVE
NON-SQ EPI CELLS #/AREA URNS LPF: ABNORMAL /LPF
NONHDLC SERPL-MCNC: 215 MG/DL
PH UR STRIP: 5 PH (ref 5–9)
PLATELET # BLD AUTO: 325 10E9/L (ref 150–450)
POTASSIUM SERPL-SCNC: 3.9 MMOL/L (ref 3.5–5.1)
PROT SERPL-MCNC: 6.7 G/DL (ref 6.4–8.9)
RBC # BLD AUTO: 4.83 10E12/L (ref 3.8–5.2)
RBC #/AREA URNS AUTO: ABNORMAL /HPF
SODIUM SERPL-SCNC: 140 MMOL/L (ref 134–144)
SOURCE: ABNORMAL
SP GR UR STRIP: 1.02 (ref 1–1.03)
TRIGL SERPL-MCNC: 719 MG/DL
TSH SERPL DL<=0.05 MIU/L-ACNC: 1.05 IU/ML (ref 0.34–5.6)
UROBILINOGEN UR STRIP-ACNC: 0.2 EU/DL (ref 0.2–1)
WBC # BLD AUTO: 10.7 10E9/L (ref 4–11)
WBC #/AREA URNS AUTO: ABNORMAL /HPF

## 2018-10-17 PROCEDURE — 83036 HEMOGLOBIN GLYCOSYLATED A1C: CPT | Performed by: FAMILY MEDICINE

## 2018-10-17 PROCEDURE — 82043 UR ALBUMIN QUANTITATIVE: CPT | Performed by: FAMILY MEDICINE

## 2018-10-17 PROCEDURE — G0463 HOSPITAL OUTPT CLINIC VISIT: HCPCS

## 2018-10-17 PROCEDURE — 99214 OFFICE O/P EST MOD 30 MIN: CPT | Performed by: FAMILY MEDICINE

## 2018-10-17 PROCEDURE — 80061 LIPID PANEL: CPT | Performed by: FAMILY MEDICINE

## 2018-10-17 PROCEDURE — 85025 COMPLETE CBC W/AUTO DIFF WBC: CPT | Performed by: FAMILY MEDICINE

## 2018-10-17 PROCEDURE — 81001 URINALYSIS AUTO W/SCOPE: CPT | Performed by: FAMILY MEDICINE

## 2018-10-17 PROCEDURE — 36415 COLL VENOUS BLD VENIPUNCTURE: CPT | Performed by: FAMILY MEDICINE

## 2018-10-17 PROCEDURE — 84443 ASSAY THYROID STIM HORMONE: CPT | Performed by: FAMILY MEDICINE

## 2018-10-17 PROCEDURE — 80053 COMPREHEN METABOLIC PANEL: CPT | Performed by: FAMILY MEDICINE

## 2018-10-17 RX ORDER — HYDROXYZINE HYDROCHLORIDE 25 MG/1
25 TABLET, FILM COATED ORAL EVERY 6 HOURS PRN
Qty: 120 TABLET | Refills: 3 | Status: SHIPPED | OUTPATIENT
Start: 2018-10-17 | End: 2019-02-08

## 2018-10-17 RX ORDER — LEVOTHYROXINE SODIUM 150 UG/1
150 TABLET ORAL DAILY
Qty: 90 TABLET | Refills: 3 | Status: SHIPPED | OUTPATIENT
Start: 2018-10-17 | End: 2019-10-07

## 2018-10-17 RX ORDER — GABAPENTIN 400 MG/1
CAPSULE ORAL
Qty: 210 CAPSULE | Refills: 11 | Status: SHIPPED | OUTPATIENT
Start: 2018-10-17 | End: 2019-02-08

## 2018-10-17 RX ORDER — LOVASTATIN 40 MG
TABLET ORAL
Qty: 90 TABLET | Refills: 3 | Status: SHIPPED | OUTPATIENT
Start: 2018-10-17 | End: 2019-02-08

## 2018-10-17 RX ORDER — LISINOPRIL AND HYDROCHLOROTHIAZIDE 20; 25 MG/1; MG/1
1 TABLET ORAL DAILY
Qty: 90 TABLET | Refills: 3 | Status: SHIPPED | OUTPATIENT
Start: 2018-10-17 | End: 2019-10-07

## 2018-10-17 NOTE — PROGRESS NOTES
SUBJECTIVE:   Narda Bennett is a 63 year old female who presents to clinic today for the following health issues:    HPI Comments: Patient recently obtained health insurance through Medicare.  Prior to that, it had been several years since she had insurance.  She had been seen regularly at the [facility] in [facility].  However, she states that she was not allowed to come there anymore after this past May.  The details of this are not entirely clear.    She needs medications refilled.  She is due for labs. She is requesting a PA so that she can continue on Atarax for her sun allergy, states that she has been on this for 18+ years.       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     OBJECTIVE:     /62 (BP Location: Right arm, Patient Position: Sitting, Cuff Size: Adult Regular)  Pulse 60  Wt 195 lb 12.8 oz (88.8 kg)  Breastfeeding? No  BMI 31.6 kg/m2  Body mass index is 31.6 kg/(m^2).  Physical Exam   Constitutional: She appears well-developed and well-nourished.   HENT:   Right Ear: External ear normal.   Left Ear: External ear normal.   Eyes: Conjunctivae are normal. No scleral icterus.   Cardiovascular: Normal rate.    Pulmonary/Chest: Effort normal. No respiratory distress.   Musculoskeletal: She exhibits no edema.   Neurological: She is alert.   Skin: No rash noted.    Psychiatric: She has a normal mood and affect.       ASSESSMENT/PLAN:         ICD-10-CM    1. Diabetic polyneuropathy associated with type 2 diabetes mellitus (H) E11.42 gabapentin (NEURONTIN) 400 MG capsule   2. Chronic pruritus L29.9 hydrOXYzine (ATARAX) 25 MG tablet   3. Benign essential hypertension I10 lisinopril-hydrochlorothiazide (PRINZIDE/ZESTORETIC) 20-25 MG per tablet   4. Hypercholesterolemia E78.00 lovastatin (MEVACOR) 40 MG tablet     Comprehensive metabolic panel     Lipid Profile   5. Type 2 diabetes mellitus without complication, unspecified whether long term insulin use (H) E11.9 metFORMIN (GLUCOPHAGE) 1000 MG tablet   6. Hypothyroidism, unspecified type E03.9 levothyroxine (SYNTHROID/LEVOTHROID) 150 MCG tablet   7. Essential hypertension I10 CBC with platelets differential     Comprehensive metabolic panel     Lipid Profile     Thyrotropin GH     *UA reflex to Microscopic   8. Type 2 diabetes mellitus without complication, without long-term current use of insulin (H) E11.9 Comprehensive metabolic panel     Hemoglobin A1c     Lipid Profile     Albumin Random Urine Quantitative with Creat Ratio   9. 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   Labs today as previosly ordered by Dr. Sainz.   Patient declines flu shot.   Handout provided on venues for foot care.   Will attempt PA for Atarax, but reviewed risks associated with this medication.     Elsie Bunch MD  [facility]

## 2018-10-17 NOTE — LETTER
October 22, 2018      Narda KHLOE Donald  [address]   [facility] [address]        Dear ,    Your lab results are enclosed.  There are a number of abnormalities that require follow-up, mainly your cholesterol and triglycerides, which are both quite markedly elevated.  Your A1c is actually improved from last time, indicating an average blood sugar of right around 165-170.    I would recommend that she schedule a follow-up appointment either with myself or Dr. Bunch.    Resulted Orders   CBC with platelets differential   Result Value Ref Range    WBC 10.7 4.0 - 11.0 10e9/L    RBC Count 4.83 3.8 - 5.2 10e12/L    Hemoglobin 13.9 11.7 - 15.7 g/dL    Hematocrit 42.9 35.0 - 47.0 %    MCV 89 78 - 100 fl    MCH 28.8 26.5 - 33.0 pg    MCHC 32.4 31.5 - 36.5 g/dL    RDW 15.3 (H) 10.0 - 15.0 %    Platelet Count 325 150 - 450 10e9/L    Diff Method Automated Method     % Neutrophils 54.7 %    % Lymphocytes 32.1 %    % Monocytes 8.9 %    % Eosinophils 2.5 %    % Basophils 1.2 %    % Immature Granulocytes 0.6 %    Absolute Neutrophil 5.8 1.6 - 8.3 10e9/L    Absolute Lymphocytes 3.4 0.8 - 5.3 10e9/L    Absolute Monocytes 1.0 0.0 - 1.3 10e9/L    Absolute Eosinophils 0.3 0.0 - 0.7 10e9/L    Absolute Basophils 0.1 0.0 - 0.2 10e9/L    Abs Immature Granulocytes 0.1 0 - 0.4 10e9/L   Comprehensive metabolic panel   Result Value Ref Range    Sodium 140 134 - 144 mmol/L    Potassium 3.9 3.5 - 5.1 mmol/L    Chloride 104 98 - 107 mmol/L    Carbon Dioxide 25 21 - 31 mmol/L    Anion Gap 11 3 - 14 mmol/L    Glucose 226 (H) 70 - 105 mg/dL    Urea Nitrogen 26 (H) 7 - 25 mg/dL    Creatinine 0.88 0.60 - 1.20 mg/dL    GFR Estimate 65 >60 mL/min/1.7m2    GFR Estimate If Black 78 >60 mL/min/1.7m2    Calcium 10.3 8.6 - 10.3 mg/dL    Bilirubin Total 0.2 (L) 0.3 - 1.0 mg/dL    Albumin 4.4 3.5 - 5.7 g/dL    Protein Total 6.7 6.4 - 8.9 g/dL    Alkaline Phosphatase 82 34 - 104 U/L    ALT 15 7 - 52 U/L    AST 14 13 - 39 U/L   Hemoglobin A1c   Result Value  Ref Range    Hemoglobin A1C 7.6 (H) 4.0 - 6.0 %   Lipid Profile   Result Value Ref Range    Cholesterol 256 (H) <200 mg/dL    Triglycerides 719 (H) <150 mg/dL      Comment:      Borderline high:  150-199 mg/dl  High:             200-499 mg/dl  Very high:       >499 mg/dl      HDL Cholesterol 41 23 - 92 mg/dL    LDL Cholesterol Calculated  <100 mg/dL     Cannot estimate LDL when triglyceride exceeds 400 mg/dL      Comment:      Desirable:       <100 mg/dl    Non HDL Cholesterol 215 (H) <130 mg/dL      Comment:      Above Desirable:  130-159 mg/dl  Borderline high:  160-189 mg/dl  High:             190-219 mg/dl  Very high:       >219 mg/dl     Thyrotropin GH   Result Value Ref Range    Thyrotropin 1.05 0.34 - 5.60 IU/mL   Albumin Random Urine Quantitative with Creat Ratio   Result Value Ref Range    Creatinine Urine 107 mg/dL    Albumin Urine mg/L 26 mg/L    Albumin Urine mg/g Cr 24.77 0 - 25 mg/g Cr   *UA reflex to Microscopic   Result Value Ref Range    Color Urine Yellow     Appearance Urine Clear     Glucose Urine Negative NEG^Negative mg/dL    Bilirubin Urine Negative NEG^Negative    Ketones Urine Trace (A) NEG^Negative mg/dL    Specific Gravity Urine 1.025 1.000 - 1.030    Blood Urine Negative NEG^Negative    pH Urine 5.0 5.0 - 9.0 pH    Protein Albumin Urine Negative NEG^Negative mg/dL    Urobilinogen Urine 0.2 0.2 - 1.0 EU/dL    Nitrite Urine Negative NEG^Negative    Leukocyte Esterase Urine Trace (A) NEG^Negative    Source Midstream Urine    Urine Microscopic   Result Value Ref Range    WBC Urine 0 - 5 OTO5^0 - 5 /HPF    RBC Urine O - 2 OTO2^O - 2 /HPF    Squamous Epithelial /LPF Urine Few FEW^Few /LPF    Bacteria Urine Moderate (A) NEG^Negative /HPF       If you have any questions or concerns, please call the clinic at the number listed above.       Sincerely,        MAC MAJOR MD on 10/22/2018 at 12:05 PM

## 2018-10-17 NOTE — MR AVS SNAPSHOT
After Visit Summary   10/17/2018    Narda Bennett    MRN: [id number]           Patient Information     Date Of Birth          1955        Visit Information        Provider Department      10/17/2018 3:15 PM Elsie Bunch MD [facility]        Today's Diagnoses     Diabetic polyneuropathy associated with type 2 diabetes mellitus (H)    -  1    Chronic pruritus        Benign essential hypertension        Hypercholesterolemia        Type 2 diabetes mellitus without complication, unspecified whether long term insulin use (H)        Hypothyroidism, unspecified type        Essential hypertension        Type 2 diabetes mellitus without complication, without long-term current use of insulin (H)        Iodine hypothyroidism           Follow-ups after your visit        Who to contact     If you have questions or need follow up information about today's clinic visit or your schedule please contact [facility] directly at [phone].  Normal or non-critical lab and imaging results will be communicated to you by MyChart, letter or phone within 4 business days after the clinic has received the results. If you do not hear from us within 7 days, please contact the clinic through MyChart or phone. If you have a critical or abnormal lab result, we will notify you by phone as soon as possible.  Submit refill requests through [employer] or call your pharmacy and they will forward the refill request to us. Please allow 3 business days for your refill to be completed.          Additional Information About Your Visit        Care EveryWhere ID     This is your Care EveryWhere ID. This could be used by other organizations to access your [place] medical records  [id number]        Your Vitals Were     Pulse Breastfeeding? BMI (Body Mass Index)             60 No 31.6 kg/m2          Blood Pressure from Last 3 Encounters:   10/17/18 128/62   01/05/17 126/80   04/27/16 134/82     Weight from Last 3 Encounters:   10/17/18 195 lb 12.8 oz (88.8 kg)   01/05/17 185 lb (83.9 kg)   04/27/16 180 lb 3.2 oz (81.7 kg)              We Performed the Following     *UA reflex to Microscopic     Albumin Random Urine Quantitative with Creat Ratio     CBC with platelets differential     Comprehensive metabolic panel     Hemoglobin A1c     Lipid Profile     Thyrotropin GH          Today's Medication Changes          These changes are accurate as of 10/17/18  5:51 PM.  If you have any questions, ask your nurse or doctor.               These medicines have changed or have updated prescriptions.        Dose/Directions    desoximetasone 0.25 % cream   Commonly known as:  TOPICORT   This may have changed:  Another medication with the same name was removed. Continue taking this medication, and follow the directions you see here.   Used for:  Rash   Changed by:  Elsie Bunch MD        APPLY TOPICALLY TO AFFECTED AREA(S) TWICE DAILY   Quantity:  15 g   Refills:  5       gabapentin 400 MG capsule   Commonly known as:  NEURONTIN   This may have changed:  Another medication with the same name was removed. Continue taking this medication, and follow the directions you see here.   Used for:  Diabetic polyneuropathy associated with type 2 diabetes mellitus (H)   Changed by:  Elsie Bunch MD        TAKE 2 CAPSULES BY MOUTH EVERY MORNING, TAKE 2 CAPSULES EVERY EVENING, AND TAKE 3 CAPSULES NIGHTLY AT BEDTIME   Quantity:  210 capsule   Refills:  11       glipiZIDE 10 MG tablet   Commonly known as:  GLUCOTROL   This may have changed:  Another medication with the same name was removed. Continue taking this medication, and follow the directions you see here.   Used for:  Type 2 diabetes mellitus without complication, unspecified long term insulin use status   Changed by:  Elsie Bunch MD        TAKE 1 TABLET BY MOUTH TWICE DAILY BEFORE A MEAL   Quantity:  180 tablet   Refills:  3       hydrOXYzine 25 MG tablet   Commonly  known as:  ATARAX   This may have changed:  Another medication with the same name was removed. Continue taking this medication, and follow the directions you see here.   Used for:  Chronic pruritus   Changed by:  Elsie Bunch MD        Dose:  25 mg   Take 1 tablet (25 mg) by mouth every 6 hours as needed for itching   Quantity:  120 tablet   Refills:  3       levothyroxine 150 MCG tablet   Commonly known as:  SYNTHROID/LEVOTHROID   This may have changed:  Another medication with the same name was removed. Continue taking this medication, and follow the directions you see here.   Used for:  Hypothyroidism, unspecified type   Changed by:  Elsie Bunch MD        Dose:  150 mcg   Take 1 tablet (150 mcg) by mouth daily   Quantity:  90 tablet   Refills:  3       lisinopril-hydrochlorothiazide 20-25 MG per tablet   Commonly known as:  PRINZIDE/ZESTORETIC   This may have changed:    - See the new instructions.  - Another medication with the same name was removed. Continue taking this medication, and follow the directions you see here.   Used for:  Benign essential hypertension   Changed by:  Elsie Bunch MD        Dose:  1 tablet   Take 1 tablet by mouth daily   Quantity:  90 tablet   Refills:  3            Where to get your medicines      These medications were sent to [id number] - [place], [place] - [address]  [address] [facility] [address]     Phone:  [phone]     gabapentin 400 MG capsule    hydrOXYzine 25 MG tablet    levothyroxine 150 MCG tablet    lisinopril-hydrochlorothiazide 20-25 MG per tablet    lovastatin 40 MG tablet    metFORMIN 1000 MG tablet                Primary Care Provider Office Phone # Fax #    Guero Sainz [phone] [address] [place] [address]        Equal Access to Services     SIM LOPEZ AH: Breanna Canela, titus enriquez, rey celestin  aravindfadiapelon gunter ah. So [facility] [phone].    ATENCIÓN: Si christiano wilson, tiene a ontiveros disposición servicios gratuitos de asistencia lingüística. Salvador thompson [phone].    We comply with applicable federal civil rights laws and [place] laws. We do not discriminate on the basis of race, color, national origin, age, disability, sex, sexual orientation, or gender identity.            Thank you!     Thank you for choosing [facility]  for your care. Our goal is always to provide you with excellent care. Hearing back from our patients is one way we can continue to improve our services. Please take a few minutes to complete the written survey that you may receive in the mail after your visit with us. Thank you!             Your Updated Medication List - Protect others around you: Learn how to safely use, store and throw away your medicines at www.disposemymeds.org.          This list is accurate as of 10/17/18  5:51 PM.  Always use your most recent med list.                   Brand Name Dispense Instructions for use Diagnosis    albuterol 108 (90 Base) MCG/ACT inhaler    PROAIR HFA/PROVENTIL HFA/VENTOLIN HFA     Inhale 2 puffs into the lungs every 4 hours as needed        amLODIPine 5 MG tablet    NORVASC    90 tablet    TAKE 1 TABLET BY MOUTH ONCE DAILY    Essential hypertension       atenolol 100 MG tablet    TENORMIN    180 tablet    TAKE ONE TABLET BY MOUTH TWICE DAILY    Benign essential hypertension       B-D ULTRA-FINE 33 LANCETS Misc      As directed. Test 2 times per day, alternating times.  ICD-10 code E11.9        desoximetasone 0.25 % cream    TOPICORT    15 g    APPLY TOPICALLY TO AFFECTED AREA(S) TWICE DAILY    Rash       fish oil-omega-3 fatty acids 1000 MG capsule      Take 1 capsule by mouth daily        gabapentin 400 MG capsule    NEURONTIN    210 capsule    TAKE 2 CAPSULES BY MOUTH EVERY MORNING, TAKE 2 CAPSULES EVERY EVENING, AND TAKE 3 CAPSULES NIGHTLY AT BEDTIME    Diabetic  polyneuropathy associated with type 2 diabetes mellitus (H)       glipiZIDE 10 MG tablet    GLUCOTROL    180 tablet    TAKE 1 TABLET BY MOUTH TWICE DAILY BEFORE A MEAL    Type 2 diabetes mellitus without complication, unspecified long term insulin use status       hydrOXYzine 25 MG tablet    ATARAX    120 tablet    Take 1 tablet (25 mg) by mouth every 6 hours as needed for itching    Chronic pruritus       levothyroxine 150 MCG tablet    SYNTHROID/LEVOTHROID    90 tablet    Take 1 tablet (150 mcg) by mouth daily    Hypothyroidism, unspecified type       lisinopril-hydrochlorothiazide 20-25 MG per tablet    PRINZIDE/ZESTORETIC    90 tablet    Take 1 tablet by mouth daily    Benign essential hypertension       lovastatin 40 MG tablet    MEVACOR    90 tablet    TAKE ONE TABLET BY MOUTH NIGHTLY AT BEDTIME    Hypercholesterolemia       metFORMIN 1000 MG tablet    GLUCOPHAGE    180 tablet    Take 1 tablet (1,000 mg) by mouth 2 times daily (with meals)    Type 2 diabetes mellitus without complication, unspecified whether long term insulin use (H)       ONETOUCH ULTRA test strip   Generic drug:  blood glucose monitoring      Dispense test strips covered by the patient's insurance.  Test 2 times per day, alternating times.  ICD-10 code E11.9        * pioglitazone 30 MG tablet    ACTOS    90 tablet    TAKE 1 TABLET BY MOUTH EVERY DAY    Type 2 diabetes mellitus without complication, unspecified long term insulin use status       * pioglitazone 15 MG tablet    ACTOS    90 tablet    TAKE 1 TABLET BY MOUTH ONCE DAILY    Type 2 diabetes mellitus without complication, unspecified long term insulin use status       roller walker Misc      4 Wheeled Walker with Seat and Brakes for home use. For 52 weeks.        SM CALCIUM 500/VITAMIN D3 500-400 MG-UNIT Tabs per tablet   Generic drug:  calcium carbonate-vitamin D      Take 1 tablet by mouth 2 times daily        [employer] BLOOD GLUCOSE MONITOR w/Device Kit      Dispense glucose meter,  test strips and lancets covered by the patient insurance. Dx 250.00. Test 1 times per day.        vitamin D3 1000 units Caps      Take 2,000 Units by mouth        * Notice:  This list has 2 medication(s) that are the same as other medications prescribed for you. Read the directions carefully, and ask your doctor or other care provider to review them with you.

## 2018-10-17 NOTE — NURSING NOTE
Patient here for medication management.  Needs lab work and Hydroxyzine  Mayela Strong LPN ..........10/17/2018 3:20 PM

## 2018-10-18 LAB
CREAT UR-MCNC: 107 MG/DL
MICROALBUMIN UR-MCNC: 26 MG/L
MICROALBUMIN/CREAT UR: 24.77 MG/G CR (ref 0–25)

## 2018-10-24 ENCOUNTER — TELEPHONE (OUTPATIENT)
Dept: FAMILY MEDICINE | Facility: OTHER | Age: 63
End: 2018-10-24

## 2018-10-24 NOTE — TELEPHONE ENCOUNTER
Patient called regarding the status of her Hydroxyzine. Informed Patient that the MD was out of the clinic and we are working on it.  Her insurance is requiring a Medical statement.   Mayela Strong LPN ..........10/24/2018 2:11 PM

## 2018-10-26 NOTE — TELEPHONE ENCOUNTER
Left message to call back.   MD is wondering if Patient wants to pay out of pocket or try a covered alternative.   Mayela Strong LPN ..........10/26/2018 11:44 AM

## 2018-10-29 NOTE — TELEPHONE ENCOUNTER
Patient stopped by the clinic to talk about this medication issue.    Informed Patient that medication was denied by her insurance and that our options are to either have her pay out of pocket or try a different medication.    Patient is unable to afford to pay out of pocket.  She did say that she is switching her insurance carrier on Nov 5th to [employer].  She will be checking with them on regards to covering this medication.  She will call us back then to let us know if we need to resend rx for that medication.   Note closed and will wait for Patient return call.   Mayela Strong LPN ..........10/29/2018 10:33 AM

## 2019-02-08 ENCOUNTER — OFFICE VISIT (OUTPATIENT)
Dept: FAMILY MEDICINE | Facility: OTHER | Age: 64
End: 2019-02-08
Attending: PHYSICAL MEDICINE & REHABILITATION
Payer: MEDICARE

## 2019-02-08 VITALS
HEIGHT: 65 IN | SYSTOLIC BLOOD PRESSURE: 138 MMHG | TEMPERATURE: 97.6 F | HEART RATE: 64 BPM | DIASTOLIC BLOOD PRESSURE: 64 MMHG | RESPIRATION RATE: 24 BRPM | BODY MASS INDEX: 32.06 KG/M2 | WEIGHT: 192.4 LBS

## 2019-02-08 DIAGNOSIS — E78.00 HYPERCHOLESTEROLEMIA: ICD-10-CM

## 2019-02-08 DIAGNOSIS — I10 ESSENTIAL HYPERTENSION: ICD-10-CM

## 2019-02-08 DIAGNOSIS — E11.42 DIABETIC POLYNEUROPATHY ASSOCIATED WITH TYPE 2 DIABETES MELLITUS (H): ICD-10-CM

## 2019-02-08 DIAGNOSIS — E11.9 TYPE 2 DIABETES MELLITUS WITHOUT COMPLICATION, WITHOUT LONG-TERM CURRENT USE OF INSULIN (H): Primary | ICD-10-CM

## 2019-02-08 DIAGNOSIS — L30.9 DERMATITIS: ICD-10-CM

## 2019-02-08 LAB — HBA1C MFR BLD: 8.8 % (ref 4–6)

## 2019-02-08 PROCEDURE — 99214 OFFICE O/P EST MOD 30 MIN: CPT | Performed by: FAMILY MEDICINE

## 2019-02-08 PROCEDURE — 83036 HEMOGLOBIN GLYCOSYLATED A1C: CPT | Performed by: FAMILY MEDICINE

## 2019-02-08 PROCEDURE — 36415 COLL VENOUS BLD VENIPUNCTURE: CPT | Performed by: FAMILY MEDICINE

## 2019-02-08 PROCEDURE — G0463 HOSPITAL OUTPT CLINIC VISIT: HCPCS | Mod: 25

## 2019-02-08 PROCEDURE — 90670 PCV13 VACCINE IM: CPT

## 2019-02-08 PROCEDURE — G0463 HOSPITAL OUTPT CLINIC VISIT: HCPCS

## 2019-02-08 PROCEDURE — G0009 ADMIN PNEUMOCOCCAL VACCINE: HCPCS | Performed by: FAMILY MEDICINE

## 2019-02-08 RX ORDER — ROSUVASTATIN CALCIUM 20 MG/1
20 TABLET, COATED ORAL DAILY
Qty: 90 TABLET | Refills: 3 | Status: SHIPPED | OUTPATIENT
Start: 2019-02-08 | End: 2020-01-30

## 2019-02-08 RX ORDER — TRIAMCINOLONE ACETONIDE 5 MG/G
1 OINTMENT TOPICAL 2 TIMES DAILY
Qty: 30 G | Refills: 3 | Status: SHIPPED | OUTPATIENT
Start: 2019-02-08 | End: 2020-02-08

## 2019-02-08 RX ORDER — GABAPENTIN 600 MG/1
TABLET ORAL
Qty: 180 TABLET | Refills: 11 | Status: SHIPPED | OUTPATIENT
Start: 2019-02-08 | End: 2020-01-09

## 2019-02-08 ASSESSMENT — MIFFLIN-ST. JEOR: SCORE: 1418.6

## 2019-02-08 ASSESSMENT — PAIN SCALES - GENERAL: PAINLEVEL: NO PAIN (0)

## 2019-02-08 NOTE — PATIENT INSTRUCTIONS
Stop the Mevacor.  Start Crestor.   A1C today.   Repeat labs in 3 months (A1C and Lipids).   May need to increase Crestor dose further based on these results.   Would recommend meeting with Diabetic Ed, get back in the habit of checking blood sugars.   May be time to adjust medications as you now have insurance, and more medications will be covered.   Increase Neurontin (see prescription).  Keep thinking about smoking cessation. Consider a quit date in the Spring. This is the most important thing you can do to decrease your risk of heart attack and stroke.   Consider establishing care with internal medicine.   Prescription sent to the pharmacy for Voltaren gel for leg pain.   Will try physical therapy at [facility]. Agree that increased activity will help with aches/pains/blood sugars, etc.

## 2019-02-08 NOTE — PROGRESS NOTES
SUBJECTIVE:   Narda Bennett is a 63 year old female who presents to clinic today for the following health issues:    She presents for medication management.  She is known to me from previous encounters at the [facility] in [facility].  She now has Medicare.  Currently many of her medications were chosen based on cost of generics, some of these may need to be adjusted.    She is a history of diabetes mellitus.  She is currently on 3 oral medications: Glucophage, glipizide, and Actos.  She does not check her blood sugars regularly.  She does not currently have a working glucometer.    She is on pravastatin for cholesterol, last triglyceride was over 700.  This was checked while on medication.  She seems to recall being on Crestor at some point in the past.    Very strong family history of heart disease and strokes.  Discussed the importance of tobacco cessation in order to reduce her risk.  She indicates understanding, states that she will think about this in the spring.  She does not feel that her  will be supportive of this.  He does not smoke, but per her report he often belittles her attempts at quitting.    Patient has previous diagnosis of neuropathy.  She is on gabapentin for this.  Does not take it at all as directed, seems to take 1 tablet every 2-3 hours.  But also often runs out of medication sooner than she should.    Reviewed vaccine recommendations.  Patient states that her late mother-in-law had a reaction to the flu shot, therefore she is not interested in this.  Discussed risks of influenza and benefits of vaccination.  She also does not want to do more than 1 vaccine at a time.  But she is willing to start the pneumonia series.    Her daughter is the lead phlebotomist at the [facility].  She helps her sort out some of her medical issues.  Narda feels that she likely should see an internist due to her multiple medical issues.        Patient Active Problem List    Diagnosis Date  "Noted     Nerve pain 10/16/2018     Priority: Medium     Diabetes mellitus, type 2 (H) 02/05/2018     Priority: Medium     Overview:   IMO Update 10/11       Hearing loss 02/05/2018     Priority: Medium     Disorder of bone and cartilage 02/05/2018     Priority: Medium     Iodine hypothyroidism 02/05/2018     Priority: Medium     Dermatitis 02/23/2016     Priority: Medium     Essential hypertension 10/20/2015     Priority: Medium     Hip fracture (H) 05/07/2015     Priority: Medium     History of artificial joint 04/16/2015     Priority: Medium     Hypercholesterolemia 10/08/2012     Priority: Medium     Lateral epicondylitis 07/25/2006     Priority: Medium     Overview:   IMO Update 10/11           Review of Systems see HPI, ROS otherwise negative.     OBJECTIVE:     /64 (BP Location: Right arm, Patient Position: Sitting, Cuff Size: Adult Regular)   Pulse 64   Temp 97.6  F (36.4  C) (Tympanic)   Resp 24   Ht 1.651 m (5' 5\")   Wt 87.3 kg (192 lb 6.4 oz)   BMI 32.02 kg/m    Body mass index is 32.02 kg/m .  Physical Exam   Constitutional: She appears well-developed and well-nourished.   HENT:   Right Ear: External ear normal.   Left Ear: External ear normal.   Eyes: Conjunctivae are normal. No scleral icterus.   Cardiovascular: Normal rate.   Pulmonary/Chest: Effort normal. No respiratory distress.   Musculoskeletal: She exhibits no edema.   Neurological: She is alert.   Skin: No rash noted.   Psychiatric: She has a normal mood and affect.       Diagnostic Test Results:  Results for orders placed or performed in visit on 02/08/19   Hemoglobin A1c   Result Value Ref Range    Hemoglobin A1C 8.8 (H) 4.0 - 6.0 %       ASSESSMENT/PLAN:         ICD-10-CM    1. Type 2 diabetes mellitus without complication, without long-term current use of insulin (H) E11.9 Hemoglobin A1c     DIABETES EDUCATOR REFERRAL     Hemoglobin A1c   2. Diabetic polyneuropathy associated with type 2 diabetes mellitus (H) E11.42 gabapentin " (NEURONTIN) 600 MG tablet     diclofenac (VOLTAREN) 1 % topical gel   3. Essential hypertension I10    4. Hypercholesterolemia E78.00 rosuvastatin (CRESTOR) 20 MG tablet   5. Dermatitis L30.9 triamcinolone (KENALOG) 0.5 % external ointment     The patient instructions below.  Did adjust her gabapentin up to a total dose of 3600 mg in a day.  However, discussed with the patient that she needs to take this exactly as directed.    Also reviewed with the patient that because of her lack of insurance, she has not had routine health maintenance or ideal management of her chronic health issues for a number of years.  She is encouraged to follow-up every 3 months until we can help get things back on track.  Discussed that it may take a while to figure out some of her concerns.    Reviewed recommended vaccines.  Prevnar today.  Strongly encouraged flu shot.  Discussed other vaccines that are recommended at her age.    Patient already has an appointment scheduled with Dr. Gordon.  She will plan to follow-up with her in April.    Patient Instructions   Stop the Mevacor.  Start Crestor.   A1C today.   Repeat labs in 3 months (A1C and Lipids).   May need to increase Crestor dose further based on these results.   Would recommend meeting with Diabetic Ed, get back in the habit of checking blood sugars.   May be time to adjust medications as you now have insurance, and more medications will be covered.   Increase Neurontin (see prescription).  Keep thinking about smoking cessation. Consider a quit date in the Spring. This is the most important thing you can do to decrease your risk of heart attack and stroke.   Consider establishing care with internal medicine.   Prescription sent to the pharmacy for Voltaren gel for leg pain.   Will try physical therapy at [facility]. Agree that increased activity will help with aches/pains/blood sugars, etc.           Elsie Bunch MD  [facility]

## 2019-02-08 NOTE — NURSING NOTE
"Patient here for medication management and wants to talk about her gabapentin.  Also here for possible bronchitis. Cough has gotten better since using vicks. Has congestion, Nasal drainage. Cough off and on now for 4 weeks.   Mayela Strong LPN ..........2/8/2019 3:01 PM   Chief Complaint   Patient presents with     Recheck Medication     medication refill     Cough     Possible Bronchitis        Initial There were no vitals taken for this visit. Estimated body mass index is 31.6 kg/m  as calculated from the following:    Height as of 1/5/17: 1.676 m (5' 6\").    Weight as of 10/17/18: 88.8 kg (195 lb 12.8 oz).  Medication Reconciliation: complete    Mayela Strong LPN    "

## 2019-02-08 NOTE — NURSING NOTE
Prior to injection, verified patient identity using patient's name and date of birth.  Due to injection administration, patient instructed to remain in clinic for 15 minutes  afterwards, and to report any adverse reaction to me immediately.    Prevnar    Drug Amount Wasted:  None.  Vial/Syringe: Syringe  Expiration Date:  10/20   Mayela Strong LPN ..........2/8/2019 4:19 PM

## 2019-02-08 NOTE — LETTER
February 13, 2019      Narda Bennett     [facility] [address]        Dear Narda,     Please see the copy of your lab results below:    Resulted Orders   Hemoglobin A1c   Result Value Ref Range    Hemoglobin A1C 8.8 (H) 4.0 - 6.0 %     Your A1C continues to be too high. It is very important that we get this number <8. Erin, Diabetic Educator, will work with you on this. Now that you have insurance, we can adjust your medications to better treat your diabetes.     Please don't hesitate to call if you have concerns or questions.        Sincerely,        Elsie Bunch MD

## 2019-03-06 DIAGNOSIS — I10 BENIGN ESSENTIAL HYPERTENSION: ICD-10-CM

## 2019-03-06 DIAGNOSIS — E11.9 TYPE 2 DIABETES MELLITUS WITHOUT COMPLICATION, WITHOUT LONG-TERM CURRENT USE OF INSULIN (H): ICD-10-CM

## 2019-03-06 RX ORDER — PIOGLITAZONEHYDROCHLORIDE 15 MG/1
15 TABLET ORAL DAILY
Qty: 90 TABLET | Refills: 1 | Status: SHIPPED | OUTPATIENT
Start: 2019-03-06 | End: 2019-05-20

## 2019-03-06 RX ORDER — ATENOLOL 100 MG/1
100 TABLET ORAL 2 TIMES DAILY
Qty: 180 TABLET | Refills: 1 | Status: SHIPPED | OUTPATIENT
Start: 2019-03-06 | End: 2019-07-23

## 2019-03-06 NOTE — TELEPHONE ENCOUNTER
Atenolol and Actos  Last Office Visit: 02/08/2019  Future Office visit:    Next 5 appointments (look out 90 days)    Apr 01, 2019 10:40 AM CDT  Office Visit with Patsy Gordon DO  [facility] ([facility]) [address]  [place] [address]  [phone]           Routing refill request to provider for review/approval.     Unable to complete prescription refill per RNMedication Refill Policy.................... Dolly Holcomb ....................  3/6/2019   1:51 PM

## 2019-03-13 ENCOUNTER — ALLIED HEALTH/NURSE VISIT (OUTPATIENT)
Dept: EDUCATION SERVICES | Facility: OTHER | Age: 64
End: 2019-03-13
Attending: FAMILY MEDICINE
Payer: MEDICARE

## 2019-03-13 DIAGNOSIS — E11.9 TYPE 2 DIABETES MELLITUS WITHOUT COMPLICATION, WITHOUT LONG-TERM CURRENT USE OF INSULIN (H): Primary | ICD-10-CM

## 2019-03-13 PROCEDURE — G0108 DIAB MANAGE TRN  PER INDIV: HCPCS

## 2019-03-13 RX ORDER — BLOOD-GLUCOSE METER
KIT MISCELLANEOUS
Qty: 100 EACH | Refills: 3 | Status: SHIPPED | OUTPATIENT
Start: 2019-03-13 | End: 2019-04-01

## 2019-03-13 RX ORDER — BLOOD-GLUCOSE METER
EACH MISCELLANEOUS
Qty: 1 KIT | Refills: 0 | Status: SHIPPED | OUTPATIENT
Start: 2019-03-13 | End: 2019-04-01

## 2019-03-13 NOTE — PROGRESS NOTES
"Diabetes Self-Management Education & Support    Diabetes Education Self Management & Training    SUBJECTIVE/OBJECTIVE:  Presents for: Individual review  Accompanied by: Self  Diabetes education in the past 24mo: No  Focus of Visit: CGM, Healthy Eating, Being Active  Diabetes type: Type 2  Date of diagnosis: approximately 1991  Disease course: Getting harder to manage  How confident are you filling out medical forms by yourself:: Extremely  Transportation concerns: Yes(Only if my  gets upset.)  Cultural Influences/Ethnic Background:  Not  or     Diabetes Symptoms & Complications  Blurred vision: No  Fatigue: Yes(occasionally)  Neuropathy: Yes  Foot ulcerations: No  Polydipsia: No  Polyphagia: Yes(My brain tells me to eat all the time, but I say no!)  Polyuria: Yes(But I had bladder surgery so that makes it more difficult.)  Visual change: Yes(I have a cataract again that needs to be removed at some point.)  Weakness: Yes(On my left side when I don't take my gabapentin.)  Weight loss: No  Slow healing wounds: No  Recent Infection(s): No  Symptom course: Stable(Some days it is worse, but mostly stable.)  Weight trend: Fluctuating minimally  Autonomic neuropathy: No  CVA: No  Heart disease: No  Nephropathy: No  Peripheral neuropathy: Yes  Peripheral Vascular Disease: No  Retinopathy: No    Patient Problem List and Family Medical History reviewed for relevant medical history, current medical status, and diabetes risk factors.    Vitals:  There were no vitals taken for this visit.  Estimated body mass index is 32.02 kg/m  as calculated from the following:    Height as of 2/8/19: 1.651 m (5' 5\").    Weight as of 2/8/19: 87.3 kg (192 lb 6.4 oz).   Last 3 BP:   BP Readings from Last 3 Encounters:   02/08/19 138/64   10/17/18 128/62   01/05/17 126/80       History   Smoking Status     Current Every Day Smoker     Packs/day: 0.25     Years: 40.00     Types: Cigarettes     Last attempt to quit: 8/1/2014 "   Smokeless Tobacco     Never Used     Comment: Quit smoking: Trying to quit       Labs:  Lab Results   Component Value Date    A1C 8.8 02/08/2019     Lab Results   Component Value Date     10/17/2018     Lab Results   Component Value Date    LDL  10/17/2018     Cannot estimate LDL when triglyceride exceeds 400 mg/dL     HDL Cholesterol   Date Value Ref Range Status   10/17/2018 41 23 - 92 mg/dL Final   ]  GFR Estimate   Date Value Ref Range Status   10/17/2018 65 >60 mL/min/1.7m2 Final     GFR Estimate If Black   Date Value Ref Range Status   10/17/2018 78 >60 mL/min/1.7m2 Final     Lab Results   Component Value Date    CR 0.88 10/17/2018     No results found for: MICROALBUMIN    Healthy Eating  Healthy Eating Assessed Today: Yes  Cultural/Christian diet restrictions?: No  Patient on a regular basis: Eats 3 meals a day  Meal planning: None  Meals include: Breakfast, Lunch, Dinner  Breakfast: Toast, yogurt or fruit  Lunch: Sub, spicy food  Dinner: Egg roll with sweet-n-sour sauce  Snacks: lemon pudding  Beverages: Water, Coffee  Has patient met with a dietitian in the past?: Yes    Being Active  Being Active Assessed Today: Yes  Exercise:: Yes  Days per week of moderate to strenuous exercise (like a brisk walk): 1  On average, minutes per day of exercise at this level: 20  How intense was your typical exercise? : Light (like stretching or slow walking)  Exercise Minutes per Week: 20  Barrier to exercise: Physical limitation(I have to walk slow due to my neuropathy.)    Monitoring  Monitoring Assessed Today: (Patient does not SMBG.)    Patient shares she does not like poking her finger or taking any kind of shot.  Listened to patient reasons and encouraged her to try again with new updated systems for BG testing.  Patient agrees to try SMBG and asks if supplies can be sent to [facility].    Patient also interested in trying FreeStyle Evonne CGM.  She requests supplies be sent to [employer].   Discussed requirements for Medicare coverage of CGM and patient does not qualify at this time.    Taking Medications  Diabetes Medication(s)     Biguanides       metFORMIN (GLUCOPHAGE) 1000 MG tablet    Take 1 tablet (1,000 mg) by mouth 2 times daily (with meals)    Sulfonylureas       glipiZIDE (GLUCOTROL) 10 MG tablet    TAKE 1 TABLET BY MOUTH TWICE DAILY BEFORE A MEAL    Insulin Sensitizing Agents       pioglitazone (ACTOS) 15 MG tablet    Take 1 tablet (15 mg) by mouth daily     pioglitazone (ACTOS) 30 MG tablet    TAKE 1 TABLET BY MOUTH EVERY DAY          Taking Medication Assessed Today: Yes  Current Treatments: Oral Agent (triple therapy)  Problems taking diabetes medications regularly?: No  Diabetes medication side effects?: No  Treatment Compliance: All of the time    Problem Solving  Problem Solving Assessed Today: Yes  Hypoglycemia Frequency: Rarely(Once when I first started the meds years ago.)  Hypoglycemia Treatment: Candy  Patient carries a carbohydrate source: Yes    Hypoglycemia symptoms  Confusion: No  Dizziness or Light-Headedness: No  Headaches: No  Hunger: No  Mood changes: No  Nervousness/Anxiety: No  Sleepiness: Yes  Speech difficulty: No  Sweats: No  Tremors: No    Hypoglycemia Complications  Blackouts: No  Hospitalization: No  Nocturnal hypoglycemia: No  Required assistance: No  Required glucagon injection: No  Seizures: No    Reducing Risks  Diabetes Risks: Family History, Age over 45 years, Hyperlipidemia  CAD Risks: Family history, Diabetes Mellitus, Hypertension, Post-menopausal, Tobacco exposure, Stress  Has dilated eye exam at least once a year?: Yes(I'm scheduled now.)  Sees dentist every 6 months?: No  Sees podiatrist (foot doctor)?: No    Healthy Coping  Healthy Coping Assessed Today: Yes  Emotional response to diabetes: Acceptance  Informal Support system:: Family  Stage of change: PRECONTEMPLATION (Not seeing need for change)  Difficulty affording diabetes management supplies?:  Yes  Support resources: Offerings in Clinic Communities  Patient Activation Measure Survey Score:  No flowsheet data found.    ASSESSMENT:  Reviewed pathophysiology with interpretation and meaning of HgA1c.  Stressed importance of testing BG daily to help keep tight control of DM2, helping to minimize risk for possible complications of uncontrolled diabetes.  Discussed benefits of keeping A1c under 7% and encouraged patient to begin testing BG daily.    Recommend carbohydrate counting, 2 - 3 choices per meal, 0 - 1 choice per snack (limiting snacks to help with weight loss and tighter BG control).      Recommend low to moderate physical activity, such as walking, working up to a minimum of 30 minutes most days, as tolerated.  Patient states she is interested in swimming and will talk with insurance for possible coverage of senior activity plan.    Discussed D5 Health Goals and patient has met 3 of 5 goals at this time.  (BP less than 140/90, ASA therapy as recommended, statin therapy as recommended, A1c less than 8%, tobacco free).        Patient's most recent   Lab Results   Component Value Date    A1C 8.8 02/08/2019    is not meeting goal of <7.0    INTERVENTION:   Diabetes knowledge and skills assessment:     Patient is knowledgeable in diabetes management concepts related to: Taking Medication    Patient needs further education on the following diabetes management concepts: Healthy Eating, Being Active, Monitoring, Problem Solving, Reducing Risks and Healthy Coping    Based on learning assessment above, most appropriate setting for further diabetes education would be: Group class or Individual setting.    Education provided today on:  AADE Self-Care Behaviors:  Diabetes Pathophysiology  Healthy Eating: carbohydrate counting and portion control  Being Active: relationship to blood glucose  Monitoring: purpose, log and interpret results, individual blood glucose targets and frequency of monitoring  Problem  Solving: high blood glucose - causes, signs/symptoms, treatment and prevention, low blood glucose - causes, signs/symptoms, treatment and prevention and carrying a carbohydrate source at all times  Reducing Risks: major complications of diabetes, prevention, early diagnostic measures and treatment of complications, foot care, appropriate dental care, annual eye exam, smoking cessation, aspirin therapy, A1C - goals, relating to blood glucose levels, how often to check, lipids levels and goals and blood pressure and goals  Healthy Coping: recognize feelings about diagnosis and benefits of making appropriate lifestyle changes    Opportunities for ongoing education and support in diabetes-self management were discussed.    Pt verbalized understanding of concepts discussed and recommendations provided today.       Education Materials Provided:  My Food Plan, Activity Pyramid, A1c flier, Tips on SMBG, Diabetes Success Plan, DSMS sheet and Diabetes Self-Management magazine.     PLAN:  See Patient Instructions for co-developed, patient-stated behavior change goals.  AVS printed and provided to patient today. See Follow-Up section for recommended follow-up.    Erin Hair RN, BSN, CDE  3/13/2019 5:29 PM   Time Spent: 60 minutes  Encounter Type: Individual    Any diabetes medication dose changes were made via the CDE Protocol and Collaborative Practice Agreement with the patient's primary care provider. A copy of this encounter was shared with the provider.

## 2019-03-13 NOTE — PATIENT INSTRUCTIONS
Diabetes Goals and Reminders    Your A1c test should be done every 3 months.  Your goal is less than 7%.   Your last result is:  Lab Results   Component Value Date    A1C 8.8 02/08/2019       Your LDL cholesterol test should be done at least once a year.  Take a statin, if prescribed by your doctor, based on age and risk factors.  Your last result is:  LDL Cholesterol Calculated   Date Value Ref Range Status   10/17/2018  <100 mg/dL Final    Cannot estimate LDL when triglyceride exceeds 400 mg/dL     Comment:     Desirable:       <100 mg/dl       Have blood pressure and weight checked every three months.  Your blood pressure goal is 140/90 or less.  Your last blood pressure reading was:   BP Readings from Last 1 Encounters:   02/08/19 138/64       Test your blood sugar 1 times per day.  Your home blood glucose target ranges are:  Fasting or Before meals:   2 hours after a meal: Less than 180      Avoid all tobacco.  Follow your healthy diet and exercise plan.  See the eye doctor every year.  See the dentist every six months.  Have kidney function tested yearly.    Take all medicine as prescribed.  Please let me know if you are having symptoms you don t expect or if you wish to stop any medicine.    Follow Up Plan  Please call or visit Diabetes Education if blood sugars are consistently out of target.  Your future lab plan is:  HgA1c in May 2019.    If you need your cholesterol checked at your next appointment, you should fast 8 to 10 hours before your appointment.  Do not eat or drink anything besides water.  Drink plenty of water and take all your usual medicine.    SUMMARY FOR TODAY'S VISIT    I will follow up with Dr. Gordon regarding request for FreeStyle Evonne CGM (Continuous Glucose Monitor).  I will send BG testing supplies to [facility].    1.  Begin testing blood sugar 1 time(s) daily, as directed.    2.  Begin carbohydrate counting, 2 - 3 choices per meal, 0 - 1 choice per snack (limiting snacks to  help with weight loss and tighter blood sugar control).     3.  Recommend low to moderate physical activity, such as walking, working up to a minimum of 30 minutes most days, as tolerated.     4.  Follow-up for continued diabetes education, as needed.     Erin Hair RN, BSN, CDE  3/13/2019 12:00 PM

## 2019-04-01 ENCOUNTER — OFFICE VISIT (OUTPATIENT)
Dept: INTERNAL MEDICINE | Facility: OTHER | Age: 64
End: 2019-04-01
Attending: INTERNAL MEDICINE
Payer: MEDICARE

## 2019-04-01 ENCOUNTER — HOSPITAL ENCOUNTER (OUTPATIENT)
Dept: GENERAL RADIOLOGY | Facility: OTHER | Age: 64
Discharge: HOME | End: 2019-04-01
Attending: INTERNAL MEDICINE | Admitting: INTERNAL MEDICINE
Payer: MEDICARE

## 2019-04-01 VITALS
WEIGHT: 196.6 LBS | SYSTOLIC BLOOD PRESSURE: 138 MMHG | DIASTOLIC BLOOD PRESSURE: 78 MMHG | HEIGHT: 65 IN | BODY MASS INDEX: 32.76 KG/M2 | TEMPERATURE: 96.8 F | RESPIRATION RATE: 18 BRPM | HEART RATE: 86 BPM

## 2019-04-01 DIAGNOSIS — L60.8 TOENAIL DEFORMITY: ICD-10-CM

## 2019-04-01 DIAGNOSIS — R73.09 ELEVATED HEMOGLOBIN A1C: ICD-10-CM

## 2019-04-01 DIAGNOSIS — L84 CALLUS OF FOOT: ICD-10-CM
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
## 2019-04-01 DIAGNOSIS — Z87.81 HISTORY OF HIP FRACTURE: ICD-10-CM

## 2019-04-01 DIAGNOSIS — M25.512 ACUTE PAIN OF LEFT SHOULDER: ICD-10-CM

## 2019-04-01 DIAGNOSIS — L50.9 HIVES: ICD-10-CM

## 2019-04-01 DIAGNOSIS — I10 ESSENTIAL HYPERTENSION: ICD-10-CM

## 2019-04-01 DIAGNOSIS — E11.42 TYPE 2 DIABETES MELLITUS WITH DIABETIC POLYNEUROPATHY, WITHOUT LONG-TERM CURRENT USE OF INSULIN (H): Primary | ICD-10-CM

## 2019-04-01 DIAGNOSIS — M25.552 HIP PAIN, LEFT: ICD-10-CM

## 2019-04-01 DIAGNOSIS — Z12.31 BREAST CANCER SCREENING BY MAMMOGRAM: ICD-10-CM

## 2019-04-01 PROBLEM — M89.9 DISORDER OF BONE AND CARTILAGE: Status: RESOLVED | Noted: 2018-02-05 | Resolved: 2019-04-01

## 2019-04-01 PROBLEM — E01.8 IODINE HYPOTHYROIDISM: Status: RESOLVED | Noted: 2018-02-05 | Resolved: 2019-04-01

## 2019-04-01 PROBLEM — M94.9 DISORDER OF BONE AND CARTILAGE: Status: RESOLVED | Noted: 2018-02-05 | Resolved: 2019-04-01

## 2019-04-01 PROCEDURE — 99215 OFFICE O/P EST HI 40 MIN: CPT | Performed by: INTERNAL MEDICINE

## 2019-04-01 PROCEDURE — G0463 HOSPITAL OUTPT CLINIC VISIT: HCPCS | Mod: 25

## 2019-04-01 PROCEDURE — 73030 X-RAY EXAM OF SHOULDER: CPT | Mod: LT

## 2019-04-01 PROCEDURE — G0463 HOSPITAL OUTPT CLINIC VISIT: HCPCS

## 2019-04-01 RX ORDER — AMLODIPINE BESYLATE 5 MG/1
5 TABLET ORAL DAILY
Qty: 90 TABLET | Refills: 4 | Status: SHIPPED | OUTPATIENT
Start: 2019-04-01 | End: 2020-04-01

## 2019-04-01 RX ORDER — HYDROXYZINE HYDROCHLORIDE 10 MG/1
10 TABLET, FILM COATED ORAL DAILY PRN
Qty: 15 TABLET | Refills: 1 | Status: SHIPPED | OUTPATIENT
Start: 2019-04-01 | End: 2019-06-26

## 2019-04-01 RX ORDER — FLASH GLUCOSE SENSOR
KIT MISCELLANEOUS
Qty: 1 EACH | Refills: 0 | Status: SHIPPED | OUTPATIENT
Start: 2019-04-01 | End: 2019-05-20

## 2019-04-01 RX ORDER — FLASH GLUCOSE SENSOR
1 KIT MISCELLANEOUS DAILY
Qty: 1 DEVICE | Refills: 0 | Status: SHIPPED | OUTPATIENT
Start: 2019-04-01 | End: 2019-05-20

## 2019-04-01 RX ORDER — GLIPIZIDE 10 MG/1
10 TABLET ORAL
Qty: 180 TABLET | Refills: 4 | Status: SHIPPED | OUTPATIENT
Start: 2019-04-01 | End: 2019-10-07 | Stop reason: ALTCHOICE

## 2019-04-01 RX ORDER — PIOGLITAZONEHYDROCHLORIDE 30 MG/1
30 TABLET ORAL DAILY
Qty: 90 TABLET | Refills: 3 | COMMUNITY
Start: 2019-04-01 | End: 2019-05-20

## 2019-04-01 ASSESSMENT — MIFFLIN-ST. JEOR: SCORE: 1437.65

## 2019-04-01 NOTE — NURSING NOTE
Patient presents to the clinic to establish care, former Elsie Tyler MD patient.   Kelly Cat LPN............. April 1, 2019 10:53 AM

## 2019-04-01 NOTE — NURSING NOTE
Patient presents to the clinic for diabetic follow-up.     Previous A1C is not at goal of <8    Lab Results   Component Value Date    A1C 8.8 02/08/2019       Patient has Type 2 Diabetes  Diabetes medications: Oral Medications: Glipizide - Dose: 10mg, Time: breakfast and Metformin - Dose: 1000mg, Time: breakfast and supper  Patient is not on a daily aspirin  Patient is on a Statin.  Blood glucose tests per day Does not test sugars at all  Urine microalbumin:creatine: NA  Foot exam Due today  Eye exam Scheduled for the summer    Blood pressure today of 138/78 is at the goal of <139/89 for diabetics.    Tobacco User:   History   Smoking Status     Current Every Day Smoker     Packs/day: 0.25     Years: 40.00     Types: Cigarettes     Last attempt to quit: 8/1/2014   Smokeless Tobacco     Never Used     Comment: Quit smoking: Trying to quit       Other concerns today:None noted.     Medication Reconciliation: complete     Kelly Cat LPN on 4/1/2019 at 11:05 AM

## 2019-04-01 NOTE — PROGRESS NOTES
Chief Complaint   Patient presents with     [facility]         HPI: Ms. Bennett is a 63 year old female who presents today for yearly physical.  She overall is feeling good.      Recent A1C 8.8%.  She is on metformin, Actos, glipizide.  she is due for a foot exam today.  She denies any obvious side effects from the meds.    She fell last fall onto her left side.  She has had some shoulder pain and stiffness since then.  She is curious if an x-ray can be done for this.    She does have recurrent episodes of hives particularly when she is exposed to the sun and wind.  She has used hydroxyzine in the past which has been beneficial.    She is on blood pressure medications.  She does take amlodipine daily along with atenolol 100 mg twice daily.  She denies any side effects.  Her blood pressure overall has been controlled.    She has continued issues with left hip pain after her hip fracture.  She did undergo joint replacement for this.  She does use a walker on a regular basis however the wheel on her walker broke.    She is due for mammogram, colonoscopy, vaccines, Pap smear, foot exam, eye exam and hep C screening.    History is discussed and updated on 4/1/2019 with patient.  It is current to the best of my knowledge as below.    Past Medical History:   Diagnosis Date     Atopic dermatitis 08/20/2013     Autoimmune thyroiditis     Hashimotos     Benign paroxysmal vertigo 03/21/2013     Calculus of kidney 01/22/2011     Closed fracture of neck of femur (H) 05/07/2015    after a fall     Disorder of cartilage      Essential hypertension 10/20/2015     Hearing loss      Lateral epicondylitis 7/25/2006    Overview:  IMO Update 10/11     Other intervertebral disc degeneration, lumbosacral region     No Comments Provided     Other specified symptoms and signs involving the circulatory and respiratory systems 08/02/2012     Peripheral vascular disease (H) 08/02/2012    with claudication     Polyp of colon     with low  grade colon polyp at the hepatic flexure and at 22 cm.; sessile serrated adenoma with low grade dysplasia colon polyp at the splenic flexure.     Pregnancy     G4, P3, A questionable 1, and 1 stillborn of a twin     Pure hypercholesterolemia      Shoulder impingement, right 09/15/2010     Tobacco use      Type 2 diabetes mellitus without complications (H)         Past Surgical History:   Procedure Laterality Date     ARTHROPLASTY HIP Left 04/2015    fall--fracture     ARTHROSCOPY KNEE Left 01/2016    lateral meniscus removal and patellar and trochlear chondroplasty     ARTHROSCOPY SHOULDER ROTATOR CUFF REPAIR Right      CHOLECYSTECTOMY       COLONOSCOPY  10/08/2012    Colonoscopy f/u 2017     ELBOW SURGERY Left 08/2009    Dr Amaya     EYE SURGERY      Previous eye surgery x 2, one time she had a fish hook in her eye     HYSTERECTOMY TOTAL ABDOMINAL, BILATERAL SALPINGO-OOPHORECTOMY, COMBINED  1989    benign reasons     LAPAROSCOPIC SUSPENSION BLADDER NECK      Dr Doan     LITHOTRIPSY  01/2011    Dr. Doan ([place] three times         Current Outpatient Medications   Medication Sig Dispense Refill     albuterol (PROAIR HFA/PROVENTIL HFA/VENTOLIN HFA) 108 (90 BASE) MCG/ACT Inhaler Inhale 2 puffs into the lungs every 4 hours as needed       amLODIPine (NORVASC) 5 MG tablet Take 1 tablet (5 mg) by mouth daily 90 tablet 4     atenolol (TENORMIN) 100 MG tablet Take 1 tablet (100 mg) by mouth 2 times daily 180 tablet 1     Continuous Blood Gluc  (FREESTYLE KATIE READER) EDDIE 1 each daily 1 Device 0     continuous blood glucose monitoring (FREESTYLE KATIE) sensor For use with Freestyle Katie Flash  for continuous monitioring of blood glucose levels. Replace sensor every 10 days. 1 each 0     diclofenac (VOLTAREN) 1 % topical gel Place onto the skin 4 times daily 1 Tube 3     fish oil-omega-3 fatty acids 1000 MG capsule Take 1 capsule by mouth daily       gabapentin (NEURONTIN) 600 MG tablet 2  tablets qam, 1 tablet qpm, 3 tablets at night. 180 tablet 11     glipiZIDE (GLUCOTROL) 10 MG tablet Take 1 tablet (10 mg) by mouth 2 times daily (before meals) 180 tablet 4     hydrOXYzine (ATARAX) 10 MG tablet Take 1 tablet (10 mg) by mouth daily as needed for itching 15 tablet 1     levothyroxine (SYNTHROID/LEVOTHROID) 150 MCG tablet Take 1 tablet (150 mcg) by mouth daily 90 tablet 3     lisinopril-hydrochlorothiazide (PRINZIDE/ZESTORETIC) 20-25 MG per tablet Take 1 tablet by mouth daily 90 tablet 3     metFORMIN (GLUCOPHAGE) 1000 MG tablet Take 1 tablet (1,000 mg) by mouth 2 times daily (with meals) 180 tablet 3     Misc. Devices (ROLLER WALKER) MISC 4 Wheeled Walker with Seat and Brakes for home use. For 52 weeks.       order for DME Equipment being ordered: walker 1 each 0     order for DME Equipment being ordered: Diabetic shoes 1 each 0     pioglitazone (ACTOS) 15 MG tablet Take 1 tablet (15 mg) by mouth daily 90 tablet 1     pioglitazone (ACTOS) 30 MG tablet Take 1 tablet (30 mg) by mouth daily 90 tablet 3     rosuvastatin (CRESTOR) 20 MG tablet Take 1 tablet (20 mg) by mouth daily 90 tablet 3     triamcinolone (KENALOG) 0.5 % external ointment Apply 1 g topically 2 times daily 30 g 3       Allergies   Allergen Reactions     Oxycodone Itching and Nausea and Vomiting     Simvastatin GI Disturbance     Atorvastatin GI Disturbance     Bicillin C-R, Itching     Codeine Hives     Erythromycin Nausea and Vomiting     No Clinical Screening - See Comments Other (See Comments)     Oral contraceptives- didn't work     Pravastatin      Other reaction(s): Edema  Muscle aching and some swelling and edema snehal-orbitally.      Procaine Unknown     Delayed reaction- stays in system for long time     Sulfa Drugs      Other reaction(s): Edema        Family History   Problem Relation Age of Onset     Cancer Father         Cancer, lung cancer.     Coronary Artery Disease Father      Heart Disease Mother         Heart  Disease,CAD     Other - See Comments Mother         Stroke/Alzheimer's     Diabetes Mother         Diabetes,type II     Breast Cancer Mother         Cancer-breast,metastatic breast cancer     Diabetes Brother         Diabetes, blind     Heart Disease Brother         Heart Disease, heart disease     Diabetes Brother         Diabetes,type II     Heart Disease Brother         Heart Disease,heart disease     Substance Abuse Brother         Drug Abuse,chemical dependency w/ drugs     Diabetes Brother         Diabetes, blind     Other - See Comments Brother         Psychiatric illness,Bipolar/Stroke, CVA     Heart Disease Brother         Heart Disease, MI and cardiac stents,     Multiple Sclerosis Daughter      No Known Problems Half-Sister        Family Status   Relation Name Status     Fa   at age 73        complications from lung cancer     Mo   at age 87        Coronary artery disease and CVA, Type II diabetes, Alzheimer's and metastatic breast cancer.     Stephon Powell      Stephon Streeter      Stephon Rhodes      Katina Juan Alive     Half-Sister Emy Alive     Katina Pittman Alive     Son Ashok Alive        Social History     Tobacco Use     Smoking status: Current Every Day Smoker     Packs/day: 0.25     Years: 40.00     Pack years: 10.00     Types: Cigarettes     Last attempt to quit: 2014     Years since quittin.6     Smokeless tobacco: Never Used     Tobacco comment: Quit smoking: Trying to quit   Substance Use Topics     Alcohol use: No       Social History     Social History Narrative     2008, Jeovany. Retired. Three kids, Yessica in [place], Ashok in [place], Altaf in [place].              ROS  GEN: -fevers/-chills/-night sweats/+wt change -gradual increase over the last year  NEURO: -headaches/-vision changes  EARS: -hearing changes  NOSE: -drainage/-congestion  MOUTH/THROAT: - sore throat/-dysphagia/-sores  LUNGS: + Occasional but not increased  "sob/-cough  CARDIOVASCULAR: -cp/-palpitations  GI: -pain/-diarrhea/-constipation/-bloody stools  :-dysuria/-hematuria  ENDOCRINE: -skin or hair changes  HEMATOLOGIC/LYMPHATIC: -swollen nodes  SKIN: -rashes/-lesions  MSK/RHEUM: +joint pain/-swelling  NEURO: -weakness/+parasthesias       EXAM:   /78 (BP Location: Right arm, Patient Position: Sitting, Cuff Size: Adult Large)   Pulse 86   Temp 96.8  F (36  C) (Tympanic)   Resp 18   Ht 1.651 m (5' 5\")   Wt 89.2 kg (196 lb 9.6 oz)   Breastfeeding? No   BMI 32.72 kg/m    Estimated body mass index is 32.72 kg/m  as calculated from the following:    Height as of this encounter: 1.651 m (5' 5\").    Weight as of this encounter: 89.2 kg (196 lb 9.6 oz).      GEN: Vitals reviewed. Healthy appearing. Patient is in no acute distress. Cooperative with exam.  HEENT: Normocephalic atraumatic.  Normal external eye, conjunctiva, lids, cornea with no scleral icterus or conjunctival erythema. Pupils equally round. Oropharynx with no erythema or exudates. Dentition adequate.    CV: Heart regular in rate and rhythm with no murmur.    LUNGS: Lungs clear to auscultation bilaterally.  Chest rise equal bilaterally.  No accessory muscle use.  ABD:  Obese, nondistended.  SKIN: Warm and dry to touch.  No rash on face, arms and legs.  EXT: No clubbing or cyanosis.   1+ bilateral lower extremity peripheral edema.  DIABETIC FOOT EXAM: No sores or lesions on feet bilaterally, no fungus noted, no erythema or ulceration, pulses adequate bilaterally, monofilament with decrease in sensation bilaterally.  Sensation is only felt in 2 out of 10 spots on the right and 3 out of 10 monofilament spots on the left.  Significant callus formation along the first MTP joint, great toe and heels is present bilaterally.  Mild toenail deformity noted.    NEURO: Alert and oriented to person, place, and time.  Cranial nerves II-XII grossly intact with no focal or lateralizing deficits.  Muscle tone normal. "  Gait normal. No tremor. Sensation intact to light touch.  MSK: ROM of upper and lower ext symmetric and full.  PSYCH: Mood is fair.  Affect appropriate. Speech fluent. Answers questions appropriately and thought process normal.       ASSESSMENT AND PLAN:    1. Type 2 diabetes mellitus with diabetic polyneuropathy, without long-term current use of insulin (H)  - diabetes uncontrolled.  She was encouraged to bring in sugar readings and we discussed trying the katie which she is interested in, Rx given.  She needs eye exam.  Recheck A1C in about 6 weeks.  Rx given for diabetic shoes  - glipiZIDE (GLUCOTROL) 10 MG tablet; Take 1 tablet (10 mg) by mouth 2 times daily (before meals)  Dispense: 180 tablet; Refill: 4  - order for DME; Equipment being ordered: Diabetic shoes  Dispense: 1 each; Refill: 0  - continuous blood glucose monitoring (FREESTYLE KATIE) sensor; For use with Freestyle Katie Flash  for continuous monitioring of blood glucose levels. Replace sensor every 10 days.  Dispense: 1 each; Refill: 0  - Continuous Blood Gluc  (FREESTYLE KATIE READER) EDDIE; 1 each daily  Dispense: 1 Device; Refill: 0  - PODIATRY/FOOT & ANKLE SURGERY REFERRAL    2. Acute pain of left shoulder  - with shoulder pain, referral placed for orthopedics evaluation  - XR Shoulder Left G/E 3 Views; Future  - ORTHOPEDICS ADULT REFERRAL    3. Fall, initial encounter  - see above  - XR Shoulder Left G/E 3 Views; Future  - ORTHOPEDICS ADULT REFERRAL    4. Essential hypertension  - Blood pressure today of 138/78   is at the goal of <140/90 with no exacerbation.  - Continue current regimen at this time.  Instructed to check BP at home.  - Cautioned patient to monitor with antibiotics, herbals and any OTC medications  - electrolytes and renal function done and ok  - amLODIPine (NORVASC) 5 MG tablet; Take 1 tablet (5 mg) by mouth daily  Dispense: 90 tablet; Refill: 4    5. Hives  - stable, continue med  - hydrOXYzine (ATARAX) 10 MG  tablet; Take 1 tablet (10 mg) by mouth daily as needed for itching  Dispense: 15 tablet; Refill: 1    6. Breast cancer screening by mammogram  - MA Screen Bilateral w/Bakari; Future    7. Hip pain, left  - rx given for walker to avoid falls  - order for DME; Equipment being ordered: walker  Dispense: 1 each; Refill: 0    8. History of hip fracture  See above  - order for DME; Equipment being ordered: walker  Dispense: 1 each; Refill: 0    9. Elevated hemoglobin A1c  - will try and obtain evonne to help monitor sugars  - order for DME; Equipment being ordered: Diabetic shoes  Dispense: 1 each; Refill: 0  - continuous blood glucose monitoring (FREESTYLE EVONEN) sensor; For use with Freestyle Evonne Flash  for continuous monitioring of blood glucose levels. Replace sensor every 10 days.  Dispense: 1 each; Refill: 0  - Continuous Blood Gluc  (FREESTYLE EVONNE READER) EDDIE; 1 each daily  Dispense: 1 Device; Refill: 0    10. Toenail deformity  - order for podiatry and diabetic shoes given neuropathy, calluses and deformity  - order for DME; Equipment being ordered: Diabetic shoes  Dispense: 1 each; Refill: 0  - PODIATRY/FOOT & ANKLE SURGERY REFERRAL    11. Callus of foot  - see above  - order for DME; Equipment being ordered: Diabetic shoes  Dispense: 1 each; Refill: 0  - PODIATRY/FOOT & ANKLE SURGERY REFERRAL      A total of 40 minutes spent with in face-to-face consultation of this patient with greater than 50% spent in counseling and care coordination of above listed medical problems including diagnosis, treatment options with emphasis on risks and benefits of each,prognosis and importance of compliance for each.     Return in about 6 weeks (around 5/13/2019) for Recheck diabetes.      ADITHYA VALERA DO   4/1/2019 12:40 PM    This document was prepared using voice generated softwear. While every attempt was made for accuracy, spelling and grammatical errors may exist.

## 2019-04-22 ENCOUNTER — OFFICE VISIT (OUTPATIENT)
Dept: PODIATRY | Facility: OTHER | Age: 64
End: 2019-04-22
Attending: INTERNAL MEDICINE
Payer: COMMERCIAL

## 2019-04-22 VITALS
SYSTOLIC BLOOD PRESSURE: 128 MMHG | WEIGHT: 198 LBS | OXYGEN SATURATION: 97 % | DIASTOLIC BLOOD PRESSURE: 54 MMHG | TEMPERATURE: 98.1 F | BODY MASS INDEX: 32.95 KG/M2 | HEART RATE: 85 BPM

## 2019-04-22 DIAGNOSIS — L84 CALLUS OF FOOT: ICD-10-CM

## 2019-04-22 DIAGNOSIS — E11.42 DIABETIC POLYNEUROPATHY ASSOCIATED WITH TYPE 2 DIABETES MELLITUS (H): ICD-10-CM

## 2019-04-22 DIAGNOSIS — E11.9 DIABETES MELLITUS TYPE 2, NONINSULIN DEPENDENT (H): Primary | ICD-10-CM

## 2019-04-22 DIAGNOSIS — Z71.6 TOBACCO ABUSE COUNSELING: ICD-10-CM

## 2019-04-22 DIAGNOSIS — I70.203 ATHEROSCLEROSIS OF ARTERY OF BOTH LOWER EXTREMITIES (H): ICD-10-CM

## 2019-04-22 DIAGNOSIS — L60.3 ONYCHODYSTROPHY: ICD-10-CM

## 2019-04-22 DIAGNOSIS — Z72.0 TOBACCO ABUSE: ICD-10-CM

## 2019-04-22 PROCEDURE — 99203 OFFICE O/P NEW LOW 30 MIN: CPT | Mod: 25 | Performed by: PODIATRIST

## 2019-04-22 PROCEDURE — 11721 DEBRIDE NAIL 6 OR MORE: CPT | Mod: Q7 | Performed by: PODIATRIST

## 2019-04-22 PROCEDURE — G0463 HOSPITAL OUTPT CLINIC VISIT: HCPCS | Mod: 25

## 2019-04-22 PROCEDURE — G0463 HOSPITAL OUTPT CLINIC VISIT: HCPCS

## 2019-04-22 ASSESSMENT — PAIN SCALES - GENERAL: PAINLEVEL: NO PAIN (0)

## 2019-04-22 NOTE — PROGRESS NOTES
"Chief complaint: Patient presents with:  Annual Visit: diabetic foot check      History of Present Illness: This 63 year old NIDDM female is seen at the request of Heidi for evaluation and suggestions of management of bilateral foot care and a diabetic foot exam. She relates to \"big time\" tingling, burning and numbness in both feet. She uses an oil on her feet and it helps a lot with the night pain. She also has difficulty trimming her own toenails. She presents in an open heeled Croc shoe because it is comfortable. She has not worn DM shoes in the past and she finds tennis shoes uncomfortable.     She has smoked 1 ppd for about a long time. She says she was continuously called the last time the Quit Plan was referred, so she does not want a quit plan referral.  Last HbA1C was 8.8% on 2/08/2019. She says she is trying to avoid taking insulin and she is working on getting her blood sugars lowered. No further pedal complaints today.     /54 (BP Location: Right arm, Patient Position: Sitting, Cuff Size: Adult Regular)   Pulse 85   Temp 98.1  F (36.7  C) (Tympanic)   Wt 89.8 kg (198 lb)   SpO2 97%   BMI 32.95 kg/m      Patient Active Problem List   Diagnosis     Diabetes mellitus, type 2 (H)     Essential hypertension     Hearing loss     Hypercholesterolemia     Nerve pain       Past Surgical History:   Procedure Laterality Date     ARTHROPLASTY HIP Left 04/2015    fall--fracture     ARTHROSCOPY KNEE Left 01/2016    lateral meniscus removal and patellar and trochlear chondroplasty     ARTHROSCOPY SHOULDER ROTATOR CUFF REPAIR Right      CHOLECYSTECTOMY       COLONOSCOPY  10/08/2012    Colonoscopy f/u 2017     ELBOW SURGERY Left 08/2009    Dr Amaya     EYE SURGERY      Previous eye surgery x 2, one time she had a fish hook in her eye     HYSTERECTOMY TOTAL ABDOMINAL, BILATERAL SALPINGO-OOPHORECTOMY, COMBINED  1989    benign reasons     LAPAROSCOPIC SUSPENSION BLADDER NECK      Dr Doan     LITHOTRIPSY  " 01/2011    Dr. Doan ([place]), three times       Current Outpatient Medications   Medication     albuterol (PROAIR HFA/PROVENTIL HFA/VENTOLIN HFA) 108 (90 BASE) MCG/ACT Inhaler     amLODIPine (NORVASC) 5 MG tablet     atenolol (TENORMIN) 100 MG tablet     Continuous Blood Gluc  (FREESTYLE KATIE READER) EDDIE     continuous blood glucose monitoring (FREESTYLE KATIE) sensor     diclofenac (VOLTAREN) 1 % topical gel     fish oil-omega-3 fatty acids 1000 MG capsule     gabapentin (NEURONTIN) 600 MG tablet     glipiZIDE (GLUCOTROL) 10 MG tablet     hydrOXYzine (ATARAX) 10 MG tablet     levothyroxine (SYNTHROID/LEVOTHROID) 150 MCG tablet     lisinopril-hydrochlorothiazide (PRINZIDE/ZESTORETIC) 20-25 MG per tablet     metFORMIN (GLUCOPHAGE) 1000 MG tablet     Misc. Devices (ROLLER WALKER) MISC     order for DME     order for DME     pioglitazone (ACTOS) 15 MG tablet     pioglitazone (ACTOS) 30 MG tablet     rosuvastatin (CRESTOR) 20 MG tablet     triamcinolone (KENALOG) 0.5 % external ointment     No current facility-administered medications for this visit.           Allergies   Allergen Reactions     Oxycodone Itching and Nausea and Vomiting     Simvastatin GI Disturbance     Atorvastatin GI Disturbance     Bicillin C-R, Itching     Codeine Hives     Erythromycin Nausea and Vomiting     No Clinical Screening - See Comments Other (See Comments)     Oral contraceptives- didn't work     Pravastatin      Other reaction(s): Edema  Muscle aching and some swelling and edema snehal-orbitally.      Procaine Unknown     Delayed reaction- stays in system for long time     Sulfa Drugs      Other reaction(s): Edema       Family History   Problem Relation Age of Onset     Cancer Father         Cancer, lung cancer.     Coronary Artery Disease Father      Heart Disease Mother         Heart Disease,CAD     Other - See Comments Mother         Stroke/Alzheimer's     Diabetes Mother         Diabetes,type II     Breast Cancer Mother          Cancer-breast,metastatic breast cancer     Diabetes Brother         Diabetes, blind     Heart Disease Brother         Heart Disease, heart disease     Diabetes Brother         Diabetes,type II     Heart Disease Brother         Heart Disease,heart disease     Substance Abuse Brother         Drug Abuse,chemical dependency w/ drugs     Diabetes Brother         Diabetes, blind     Other - See Comments Brother         Psychiatric illness,Bipolar/Stroke, CVA     Heart Disease Brother         Heart Disease, MI and cardiac stents,     Multiple Sclerosis Daughter      No Known Problems Half-Sister        Social History     Socioeconomic History     Marital status:      Spouse name: Not on file     Number of children: Not on file     Years of education: Not on file     Highest education level: Not on file   Occupational History     Not on file   Social Needs     Financial resource strain: Not on file     Food insecurity:     Worry: Not on file     Inability: Not on file     Transportation needs:     Medical: Not on file     Non-medical: Not on file   Tobacco Use     Smoking status: Current Every Day Smoker     Packs/day: 0.25     Years: 40.00     Pack years: 10.00     Types: Cigarettes     Last attempt to quit: 2014     Years since quittin.7     Smokeless tobacco: Never Used     Tobacco comment: Quit smoking: Trying to quit   Substance and Sexual Activity     Alcohol use: No     Drug use: No     Comment: Drug use: No     Sexual activity: Not Currently   Lifestyle     Physical activity:     Days per week: Not on file     Minutes per session: Not on file     Stress: Not on file   Relationships     Social connections:     Talks on phone: Not on file     Gets together: Not on file     Attends Taoism service: Not on file     Active member of club or organization: Not on file     Attends meetings of clubs or organizations: Not on file     Relationship status: Not on file     Intimate partner violence:      Fear of current or ex partner: Not on file     Emotionally abused: Not on file     Physically abused: Not on file     Forced sexual activity: Not on file   Other Topics Concern     Not on file   Social History Narrative     August 8, 2008, Jeovany. Retired. Three kids, Yessica in [place], Ashok in [place], Altaf in [place].       ROS: 10 point ROS neg other than the symptoms noted above in the HPI.  EXAM  Constitutional: healthy, alert and no distress    Psychiatric: mentation appears normal and affect normal/bright    VASCULAR:  -Dorsalis pedis pulse +1/4 b/l  -Posterior tibial pulse +1/4 b/l  -Capillary refill time < 3 seconds to b/l hallux  -Hair growth Absent to b/l anterior legs and ankles  -Mild 1+ pitting edema to bilateral ankles  NEURO:  -Light touch sensation intact to b/l plantar forefoot  -Protective sensation intact with SWM +10/10 RIGHT and +10/10 LEFT   DERM:  -Mild hyperkeratotic lesion to RIGHT sub 1st metatarsal head. Very superficial. No open wound.  -Mild, diffuse hyperkeratotic lesion to bilateral medial hallux IPJ  -Skin moderately warm to bilateral feet and legs with mild erythema diffusely to bilateral feet  -Skin atrophic, dry and flaking  -Toenails elongated, dystrophic and discolored x 10  MSK:  -Muscle strength of ankles +5/5 for dorsiflexion, plantarflexion, ABDUction and ADDuction b/l    ============================================================    ASSESSMENT:  (E11.9) Diabetes mellitus type 2, noninsulin dependent (H)  (primary encounter diagnosis)    (L60.3) Onychodystrophy    (L84) Callus of foot    (E11.42) Diabetic polyneuropathy associated with type 2 diabetes mellitus (H)    (I70.203) Atherosclerosis of artery of both lower extremities (H)    (Z72.0) Tobacco abuse    (Z71.6) Tobacco abuse counseling        PLAN:  -Patient evaluated and examined. Treatment options discussed with no educational barriers noted.  --Diabetic Foot Education provided. This included  checking the feet daily looking for new new blisters or wounds, wearing shoes at all times when walking including around the house, and avoiding lotion application between the toes. Any sign of infection in the foot warrant's the patient presenting to the ED as soon as possible.   -Diabetic shoes ordered today. Patient will be called within the next couple of weeks to schedule this appointment.  -Tobacco cessation discussed with patient including the benefits of quitting and the risks of not quitting. The Quit Plan referral was offered and patient stated she was called by them too many times in the past and she does not want this order placed again. Patient is not not interested in quitting today.  -Nails debrided x 10 without incident  -Routine DM foot care for patient with sensation  -Patient in agreement with the above treatment plan and all of patient's questions were answered.      RTC three months for high risk nail debridement        Eileen Morales DPM

## 2019-04-22 NOTE — NURSING NOTE
"Chief Complaint   Patient presents with     Annual Visit     diabetic foot check       Initial /54 (BP Location: Right arm, Patient Position: Sitting, Cuff Size: Adult Regular)   Pulse 85   Temp 98.1  F (36.7  C) (Tympanic)   Wt 89.8 kg (198 lb)   SpO2 97%   BMI 32.95 kg/m   Estimated body mass index is 32.95 kg/m  as calculated from the following:    Height as of 4/1/19: 1.651 m (5' 5\").    Weight as of this encounter: 89.8 kg (198 lb).  Medication Reconciliation: complete    Kamilla Roper LPN  "

## 2019-04-22 NOTE — PATIENT INSTRUCTIONS
-Diabetic Foot Education:  ---Check the feet daily looking for new new blisters or wounds  ---Wear shoes at all times when walking including around the house  ---Avoid lotion application between the toes.   ---If you have any open wounds with signs of infection (redness, swelling, pain, purulence, fever, chills, nausea, vomiting), go to the Emergency Department as soon as possible.        HOW TO QUIT SMOKING  Smoking is one of the hardest habits to break. About half of all those who have ever smoked have been able to quit, and most of those (about 70%) who still smoke want to quit. Here are some of the best ways to stop smoking.     KEEP TRYING:  It takes most smokers about 8 tries before they are finally able to fully quit. So, the more often you try and fail, the better your chance of quitting the next time! So, don't give up!    GO COLD TURKEY:  Most ex-smokers quit cold turkey. Trying to cut back gradually doesn't seem to work as well, perhaps because it continues the smoking habit. Also, it is possible to fool yourself by inhaling more while smoking fewer cigarettes. This results in the same amount of nicotine in your body!    GET SUPPORT:  Support programs can make an important difference, especially for the heavy smoker. These groups offer lectures, methods to change your behavior and peer support. Call the free national Quitline for more information. 800-QUIT-NOW (098-720-2899). Low-cost or free programs are offered by many hospitals, local chapters of the American Lung Association (718-384-5227) and the American Cancer Society (569-332-4086). Support at home is important too. Non-smokers can help by offering praise and encouragement. If the smoker fails to quit, encourage them to try again!    OVER-THE-COUNTER MEDICINES:  For those who can't quit on their own, Nicotine Replacement Therapy (NRT) may make quitting much easier. Certain aids such as the nicotine patch, gum and lozenge are available without a  prescription. However, it is best to use these under the guidance of your doctor. The skin patch provides a steady supply of nicotine to the body. Nicotine gum and lozenge gives temporary bursts of low levels of nicotine. Both methods take the edge off the craving for cigarettes. WARNING: If you feel symptoms of nicotine overdose, such as nausea, vomiting, dizziness, weakness, or fast heartbeat, stop using these and see your doctor.    PRESCRIPTION MEDICINES:  After evaluating your smoking patterns and prior attempts at quitting, your doctor may offer a prescription medicine such as bupropion (Zyban, Wellbutrin), varenicline (Chantix, Champix), a niocotine inhaler or nasal spray. Each has its unique advantage and side effects which your doctor can review with you.    HEALTH BENEFITS OF QUITTING:  The benefits of quitting start right away and keep improving the longer you go without smokin minutes: blood pressure and pulse return to normal  8 hours: oxygen levels return to normal  2 days: ability to smell and taste begins to improve as damaged nerves start to regrow  2-3 weeks: circulation and lung function improves  1-9 months: decreased cough, congestion and shortness of breath; less tired  1 year: risk of heart attack decreases by half  5 years: risk of lung cancer decreases by half; risk of stroke becomes the same as a non-smoker  For information about how to quit smoking, visit the following links:  [employer] ,   Clearing the Air, Quit Smoking Today   - an online booklet. http://www.smokefree.gov/pubs/clearing_the_air.pdf  Smokefree.gov http://smokefree.gov/  QuitNet http://www.quitnet.com/    [id number] Luanne Acuña, [address] [place] [address]. All rights reserved. This information is not intended as a substitute for professional medical care. Always follow your healthcare professional's instructions.    The Benefits of Living Smoke Free  What do you want to gain from  quitting? Check off some reasons to quit.  Health Benefits  ___ Reduce my risk of lung cancer, heart disease, chronic lung disease  ___ Have fewer wrinkles and softer skin  ___ Improve my sense of taste and smell  ___ For pregnant women--reduce the risk of having a miscarriage, stillbirth, premature birth, or low-birth-weight baby  Personal Benefits  ___ Feel more in control of my life  ___ Have better-smelling hair, breath, clothes, home, and car  ___ Save time by not having to take smoke breaks, buy cigarettes, or hunt for a light  ___ Have whiter teeth  Family Benefits  ___ Reduce my children s respiratory tract infections  ___ Set a good example for my children  ___ Reduce my family s cancer risk  Financial Benefits  ___ Save hundreds of dollars each year that would be spent on cigarettes  ___ Save money on medical bills  ___ Save on life, health, and car insurance premiums    Those Dollars Add Up!  Cigarettes are expensive, and getting more expensive all the time. Do you realize how much money you are spending on cigarettes per year? What is the average amount you spend on a pack of cigarettes? What is the average number of packs that you smoke per day? Using your answers to these questions, fill in this formula to help you find out:  ($ _____ per pack) ×  ( _____ number of packs per day) × (365 days) =  $ _____ yearly cost of smoking  Besides tobacco, there are other costs, including extra cleaning bills and replacement costs for clothing and furniture; medical expenses for smoking-related illnesses; and higher health, life, and car insurance premiums.    Cigars and Pipes Count Too!  Cigars and pipes are also dangerous. So are smokeless (chewing) tobacco and snuff. All of these products contain nicotine, a highly addictive substance that has harmful effects on your body. Quitting smoking means giving up all tobacco products.      [id number] Luanne Acuña, [address] [facility], [place] [address]. All rights  reserved. This information is not intended as a substitute for professional medical care. Always follow your healthcare professional's instructions.

## 2019-05-01 ENCOUNTER — TELEPHONE (OUTPATIENT)
Dept: INTERNAL MEDICINE | Facility: OTHER | Age: 64
End: 2019-05-01

## 2019-05-01 DIAGNOSIS — E78.00 HYPERCHOLESTEROLEMIA: ICD-10-CM

## 2019-05-01 DIAGNOSIS — Z11.59 ENCOUNTER FOR HEPATITIS C SCREENING TEST FOR LOW RISK PATIENT: ICD-10-CM

## 2019-05-01 DIAGNOSIS — E11.9 DIABETES MELLITUS, TYPE 2 (H): Primary | ICD-10-CM

## 2019-05-01 DIAGNOSIS — E11.42 TYPE 2 DIABETES MELLITUS WITH DIABETIC POLYNEUROPATHY, WITHOUT LONG-TERM CURRENT USE OF INSULIN (H): ICD-10-CM

## 2019-05-01 DIAGNOSIS — I10 ESSENTIAL HYPERTENSION: ICD-10-CM

## 2019-05-01 NOTE — TELEPHONE ENCOUNTER
SK-Pt requesting lab orders prior to visit-let her know if any need fasting. Thank you.  Delaney Messer

## 2019-05-01 NOTE — TELEPHONE ENCOUNTER
Patient has appt with Dr Gordon 5/20 for multiple issues and DM, has lab appt prior.     Last A1C 2/8/19, previous labs done 120/17/18 ua, microalbumin, tsh, lipid, a1c, cmp, and cbc.       Pended all but the urine orders. Please add or remove as needed.   Nany Culp LPN .............5/1/2019     3:57 PM

## 2019-05-06 NOTE — TELEPHONE ENCOUNTER
Patient contacted and notified of lab orders being placed.  Attempted to transfer patient and lost connection. [facility] notified and they are contacting her to schedule an appointment.    Sonja Macias LPN on 5/6/2019 at 9:36 AM

## 2019-05-20 ENCOUNTER — OFFICE VISIT (OUTPATIENT)
Dept: INTERNAL MEDICINE | Facility: OTHER | Age: 64
End: 2019-05-20
Attending: INTERNAL MEDICINE
Payer: MEDICARE

## 2019-05-20 ENCOUNTER — HOSPITAL ENCOUNTER (OUTPATIENT)
Dept: MAMMOGRAPHY | Facility: OTHER | Age: 64
Discharge: HOME | End: 2019-05-20
Attending: INTERNAL MEDICINE | Admitting: INTERNAL MEDICINE
Payer: MEDICARE

## 2019-05-20 VITALS
HEART RATE: 75 BPM | RESPIRATION RATE: 16 BRPM | DIASTOLIC BLOOD PRESSURE: 60 MMHG | TEMPERATURE: 96.6 F | HEIGHT: 65 IN | SYSTOLIC BLOOD PRESSURE: 120 MMHG | WEIGHT: 192.2 LBS | OXYGEN SATURATION: 94 % | BODY MASS INDEX: 32.02 KG/M2

## 2019-05-20 DIAGNOSIS — M85.89 OSTEOPENIA OF MULTIPLE SITES: ICD-10-CM

## 2019-05-20 DIAGNOSIS — Z23 NEED FOR DIPHTHERIA-TETANUS-PERTUSSIS (TDAP) VACCINE: ICD-10-CM

## 2019-05-20 DIAGNOSIS — Z78.0 ASYMPTOMATIC POSTMENOPAUSAL STATUS: ICD-10-CM

## 2019-05-20 DIAGNOSIS — E11.9 TYPE 2 DIABETES MELLITUS WITHOUT COMPLICATION, WITHOUT LONG-TERM CURRENT USE OF INSULIN (H): Primary | ICD-10-CM

## 2019-05-20 DIAGNOSIS — I10 ESSENTIAL HYPERTENSION: ICD-10-CM

## 2019-05-20 DIAGNOSIS — E11.42 TYPE 2 DIABETES MELLITUS WITH DIABETIC POLYNEUROPATHY, WITHOUT LONG-TERM CURRENT USE OF INSULIN (H): ICD-10-CM

## 2019-05-20 DIAGNOSIS — Z23 NEED FOR SHINGLES VACCINE: ICD-10-CM

## 2019-05-20 DIAGNOSIS — R63.5 WEIGHT GAIN: ICD-10-CM

## 2019-05-20 DIAGNOSIS — Z11.59 ENCOUNTER FOR HEPATITIS C SCREENING TEST FOR LOW RISK PATIENT: ICD-10-CM

## 2019-05-20 DIAGNOSIS — Z12.31 BREAST CANCER SCREENING BY MAMMOGRAM: ICD-10-CM

## 2019-05-20 LAB
ANION GAP SERPL CALCULATED.3IONS-SCNC: 8 MMOL/L (ref 3–14)
BUN SERPL-MCNC: 16 MG/DL (ref 7–25)
CALCIUM SERPL-MCNC: 9.9 MG/DL (ref 8.6–10.3)
CHLORIDE SERPL-SCNC: 106 MMOL/L (ref 98–107)
CO2 SERPL-SCNC: 26 MMOL/L (ref 21–31)
CREAT SERPL-MCNC: 0.77 MG/DL (ref 0.6–1.2)
GFR SERPL CREATININE-BSD FRML MDRD: 75 ML/MIN/{1.73_M2}
GLUCOSE SERPL-MCNC: 187 MG/DL (ref 70–105)
HBA1C MFR BLD: 9 % (ref 4–6)
POTASSIUM SERPL-SCNC: 4 MMOL/L (ref 3.5–5.1)
SODIUM SERPL-SCNC: 140 MMOL/L (ref 134–144)
T3FREE SERPL-MCNC: 2.7 PG/ML (ref 2.5–3.9)
T4 FREE SERPL-MCNC: 1.01 NG/DL (ref 0.6–1.6)
TSH SERPL DL<=0.05 MIU/L-ACNC: 2.74 IU/ML (ref 0.34–5.6)

## 2019-05-20 PROCEDURE — G0463 HOSPITAL OUTPT CLINIC VISIT: HCPCS

## 2019-05-20 PROCEDURE — 36415 COLL VENOUS BLD VENIPUNCTURE: CPT | Mod: ZL | Performed by: INTERNAL MEDICINE

## 2019-05-20 PROCEDURE — 83036 HEMOGLOBIN GLYCOSYLATED A1C: CPT | Performed by: INTERNAL MEDICINE

## 2019-05-20 PROCEDURE — 84481 FREE ASSAY (FT-3): CPT | Mod: ZL | Performed by: INTERNAL MEDICINE

## 2019-05-20 PROCEDURE — 80048 BASIC METABOLIC PNL TOTAL CA: CPT | Performed by: INTERNAL MEDICINE

## 2019-05-20 PROCEDURE — 36415 COLL VENOUS BLD VENIPUNCTURE: CPT | Performed by: INTERNAL MEDICINE

## 2019-05-20 PROCEDURE — 86803 HEPATITIS C AB TEST: CPT | Performed by: INTERNAL MEDICINE

## 2019-05-20 PROCEDURE — 84439 ASSAY OF FREE THYROXINE: CPT | Mod: ZL | Performed by: INTERNAL MEDICINE

## 2019-05-20 PROCEDURE — 99214 OFFICE O/P EST MOD 30 MIN: CPT | Performed by: INTERNAL MEDICINE

## 2019-05-20 PROCEDURE — 77063 BREAST TOMOSYNTHESIS BI: CPT

## 2019-05-20 PROCEDURE — 84443 ASSAY THYROID STIM HORMONE: CPT | Mod: ZL | Performed by: INTERNAL MEDICINE

## 2019-05-20 RX ORDER — BRIMONIDINE TARTRATE 2 MG/ML
SOLUTION/ DROPS OPHTHALMIC
Refills: 0 | COMMUNITY
Start: 2019-05-08 | End: 2019-07-08

## 2019-05-20 ASSESSMENT — MIFFLIN-ST. JEOR: SCORE: 1417.69

## 2019-05-20 ASSESSMENT — PAIN SCALES - GENERAL: PAINLEVEL: NO PAIN (0)

## 2019-05-20 NOTE — PROGRESS NOTES
Chief Complaint   Patient presents with     RECHECK         HPI: Ms. Bennett is a 63 year old female who presents today for follow up of type 2 diabetes mellitus.    She reports that overall she is doing well.  Her only concern is that she has had some weight gain.  Reviewing her records she has had a 6 pound weight loss.  For her diabetes she is on glipizide and metformin.  Her most recent A1c was done this morning and has increased to 9%.  She reports that she does have some difficulty with dietary compliance.    With her weight gain she is interested in possibly checking her thyroid.  She is on levothyroxine.  She would like to T4 and T3 obtained.      She has a history of osteopenia and is due for a DEXA scan.  She is also due for tetanus vaccine and shingles vaccine.    She  reports that she has been smoking cigarettes.  She has a 10.00 pack-year smoking history. She has never used smokeless tobacco.    Past medical history reviewed as below:     Past Medical History:   Diagnosis Date     Atopic dermatitis 08/20/2013     Autoimmune thyroiditis     Hashimotos     Benign paroxysmal vertigo 03/21/2013     Calculus of kidney 01/22/2011     Closed fracture of neck of femur (H) 05/07/2015    after a fall     Disorder of cartilage      Essential hypertension 10/20/2015     Hearing loss      Lateral epicondylitis 7/25/2006    Overview:  IMO Update 10/11     Other intervertebral disc degeneration, lumbosacral region     No Comments Provided     Other specified symptoms and signs involving the circulatory and respiratory systems 08/02/2012     Peripheral vascular disease (H) 08/02/2012    with claudication     Polyp of colon     with low grade colon polyp at the hepatic flexure and at 22 cm.; sessile serrated adenoma with low grade dysplasia colon polyp at the splenic flexure.     Pregnancy     G4, P3, A questionable 1, and 1 stillborn of a twin     Pure hypercholesterolemia      Shoulder impingement, right 09/15/2010  "    Tobacco use      Type 2 diabetes mellitus without complications (H)    .      ROS  Pertinent ROS was performed and was negative, including for fever, chills, chest pain, shortness of breath, increased lower extremity edema, changes in bowel or bladder, blood in the stool, difficulty swallowing, sores in the mouth. No other concerns, with exception of HPI above.      EXAM:   /60 (BP Location: Right arm, Patient Position: Sitting, Cuff Size: Adult Regular)   Pulse 75   Temp 96.6  F (35.9  C) (Tympanic)   Resp 16   Ht 1.651 m (5' 5\")   Wt 87.2 kg (192 lb 3.2 oz)   SpO2 94%   BMI 31.98 kg/m      Estimated body mass index is 31.98 kg/m  as calculated from the following:    Height as of this encounter: 1.651 m (5' 5\").    Weight as of this encounter: 87.2 kg (192 lb 3.2 oz).      GEN: Vitals reviewed. Healthy appearing. Patient is in no acute distress. Cooperative with exam.  HEENT: Normocephalic atraumatic.  Pupils equally round.  No scleral icterus, no conjunctival erythema. Oropharynx with no erythema or exudates. Dentition adequate.  NECK: Supple; no thyromegaly.  No neck, cervical LAD.   CV: Heart regular in rate and rhythm with no murmur.    LUNGS: Lungs exp rhonchi bilaterally.  Chest rise equal bilaterally.  No accessory muscle use.  ABD:  Obese.  SKIN: Warm and dry to touch.  No rash on face, arms and legs.  EXT: No clubbing or cyanosis.  Trace lower ext peripheral edema.  PSYCH: Mood is good.  Affect appropriate. Speech fluent. Answers questions appropriately and thought process normal.    LABS: 5/20/2019 - Personally ordered/reviewed  Results for orders placed or performed in visit on 05/20/19   Basic Metabolic Panel   Result Value Ref Range    Sodium 140 134 - 144 mmol/L    Potassium 4.0 3.5 - 5.1 mmol/L    Chloride 106 98 - 107 mmol/L    Carbon Dioxide 26 21 - 31 mmol/L    Anion Gap 8 3 - 14 mmol/L    Glucose 187 (H) 70 - 105 mg/dL    Urea Nitrogen 16 7 - 25 mg/dL    Creatinine 0.77 0.60 - " 1.20 mg/dL    GFR Estimate 75 >60 mL/min/[1.73_m2]    GFR Estimate If Black >90 >60 mL/min/[1.73_m2]    Calcium 9.9 8.6 - 10.3 mg/dL   Hemoglobin A1c   Result Value Ref Range    Hemoglobin A1C 9.0 (H) 4.0 - 6.0 %           ASSESSMENT AND PLAN:    Type 2 diabetes mellitus without complication, without long-term current use of insulin (H)  -Blood sugars are elevated.  She will continue on her glipizide, metformin and we will add Jardiance.  She is to call if she has any issues obtaining the medication.  She is provided with a blood glucose monitoring kit and instructed to check her blood sugar 1-2 times daily.  She should bring these with her follow-up appointment.  She should continue on her aspirin and statin.  - blood glucose monitoring (NO BRAND SPECIFIED) meter device kit  Dispense: 1 kit; Refill: 0  - Hemoglobin A1c  - empagliflozin (JARDIANCE) 10 MG TABS tablet  Dispense: 30 tablet; Refill: 2    Weight gain  -Etiology is likely secondary to diet and behavioral changes although again she has lost weight per our records.  Thyroid is checked and normal.  - T3, Free  - T4, Free  - TSH    Osteopenia of multiple sites  -Continue with vitamin D  - DX Hip/Pelvis/Spine    Asymptomatic postmenopausal status  - DX Hip/Pelvis/Spine    Need for diphtheria-tetanus-pertussis (Tdap) vaccine  Rx given  - Tdap, tetanus-diphtheria-acell pertussis, (ADACEL) 5-2-15.5 LF-MCG/0.5 injection  Dispense: 0.5 mL; Refill: 0    Need for shingles vaccine  Rx given  - zoster vaccine recombinant adjuvanted (SHINGRIX) injection  Dispense: 0.5 mL; Refill: 0         Return in about 6 weeks (around 7/1/2019) for Recheck diabetes.      ADITHYA VALERA DO   5/20/2019 11:19 AM    This document was prepared using voice generated softwear. While every attempt was made for accuracy, grammatical errors may exist.

## 2019-05-20 NOTE — NURSING NOTE
Patient presents to the clinic for diabetic follow-up.     Previous A1C is not at goal of <8    Lab Results   Component Value Date    A1C 9.0 05/20/2019       Patient has Type 2 Diabetes  Diabetes medications: Oral Medications: Glipizide - Dose: 10 MG, Time: breakfast, lunch and supper and Metformin - Dose: 1000 MG, Time: breakfast and supper  Patient is not on a daily aspirin  Patient is on a Statin.  Blood glucose tests per day 3  Urine microalbumin:creatine: 10/17/2018  Foot exam 04/01/2019  Eye exam 07/12/1956, 05/07/2019  Blood pressure today of 120/60 is at the goal of <139/89 for diabetics.    Tobacco User:   History   Smoking Status     Current Every Day Smoker     Packs/day: 0.25     Years: 40.00     Types: Cigarettes     Last attempt to quit: 8/1/2014   Smokeless Tobacco     Never Used     Comment: Quit smoking: Trying to quit       Other concerns today: Weight gain, upcomming laser eye surgery    Medication Reconciliation: complete     Kelly Cat LPN on 5/20/2019 at 10:49 AM

## 2019-05-20 NOTE — LETTER
May 22, 2019      [address]     [place] [address]        Dear ,    We are writing to inform you of your test results. Additional testing done including thyroid tests and hepatitis C antibody are all normal.      Resulted Orders   T3, Free   Result Value Ref Range    Free T3 2.7 2.5 - 3.9 pg/mL   T4, Free   Result Value Ref Range    T4 Free 1.01 0.60 - 1.60 ng/dL   TSH   Result Value Ref Range    Thyrotropin 2.74 0.34 - 5.60 IU/mL       If you have any questions or concerns, please call the clinic at the number listed above.       Sincerely,        Patsy Gordon, DO

## 2019-05-21 LAB — HCV AB SERPL QL IA: NONREACTIVE

## 2019-05-24 RX ORDER — ASPIRIN 81 MG/1
81 TABLET, CHEWABLE ORAL DAILY
Status: ON HOLD | COMMUNITY
Start: 2019-05-24 | End: 2021-10-18

## 2019-06-08 DIAGNOSIS — E11.9 TYPE 2 DIABETES MELLITUS WITHOUT COMPLICATION, WITHOUT LONG-TERM CURRENT USE OF INSULIN (H): Primary | ICD-10-CM

## 2019-06-12 RX ORDER — PIOGLITAZONEHYDROCHLORIDE 30 MG/1
TABLET ORAL
Qty: 90 TABLET | OUTPATIENT
Start: 2019-06-12

## 2019-06-12 NOTE — TELEPHONE ENCOUNTER
Medication discontinued on 5-20-19 with reason: None.  Office visit notes state will start Jardiance. Refused. Eliza Finn RN on 6/12/2019 at 1:34 PM

## 2019-06-19 ENCOUNTER — TELEPHONE (OUTPATIENT)
Dept: INTERNAL MEDICINE | Facility: OTHER | Age: 64
End: 2019-06-19

## 2019-06-19 NOTE — TELEPHONE ENCOUNTER
"Received faxed refill request from [facility] for Actos 30 mg daily.    With a note from pharmacy: \" she was not aware of a change. Please send new Rx for Actos 30 mg. Thanks TW\"    Called patient and patient states she was not aware that Actos was discontinued and Jardiance started.    Patient would like clarification from Dr. Gordon if she is to discontinue Actos and now be taking Jardiance.    Please advised Sotero and patient.    Jacqueline Coronado RN on 6/19/2019 at 9:00 AM    "

## 2019-06-20 ENCOUNTER — TELEPHONE (OUTPATIENT)
Dept: INTERNAL MEDICINE | Facility: OTHER | Age: 64
End: 2019-06-20

## 2019-06-20 ENCOUNTER — HOSPITAL ENCOUNTER (OUTPATIENT)
Dept: BONE DENSITY | Facility: OTHER | Age: 64
Discharge: HOME | End: 2019-06-20
Attending: INTERNAL MEDICINE | Admitting: INTERNAL MEDICINE
Payer: MEDICARE

## 2019-06-20 DIAGNOSIS — M85.89 OSTEOPENIA OF MULTIPLE SITES: ICD-10-CM

## 2019-06-20 DIAGNOSIS — Z78.0 ASYMPTOMATIC POSTMENOPAUSAL STATUS: ICD-10-CM

## 2019-06-20 PROCEDURE — 77080 DXA BONE DENSITY AXIAL: CPT

## 2019-06-20 NOTE — TELEPHONE ENCOUNTER
Patient states she needs [facility] to send rx for jardiance to [facility] by 1:30 today.  She will be leaving town for several days.    Elsie Gould on 6/20/2019 at 10:36 AM

## 2019-06-20 NOTE — TELEPHONE ENCOUNTER
Left message stating that there should be a refill of medication at [facility]. Malathi Stover LPN .............6/20/2019  10:40 AM

## 2019-06-21 ENCOUNTER — TELEPHONE (OUTPATIENT)
Dept: INTERNAL MEDICINE | Facility: OTHER | Age: 64
End: 2019-06-21

## 2019-06-21 NOTE — TELEPHONE ENCOUNTER
Called and left voicemail with patient stating that there was a fax error, this writer did re-fax medications over to [facility] and should be able for  today.     Kelly Cat LPN............. June 21, 2019 11:35 AM

## 2019-06-21 NOTE — TELEPHONE ENCOUNTER
Pt called stating that her Rx for Jardiance is not at the pharmacy. She states she needs it by 3pm today as she is going out of town for 2 weeks.

## 2019-06-28 ENCOUNTER — TELEPHONE (OUTPATIENT)
Dept: INTERNAL MEDICINE | Facility: OTHER | Age: 64
End: 2019-06-28

## 2019-07-01 NOTE — TELEPHONE ENCOUNTER
Called and left message for patient to please return call. Unsure as to why patient is calling.     Eunice Potter LPN on 7/1/2019 at 9:17 AM

## 2019-07-02 ENCOUNTER — OFFICE VISIT (OUTPATIENT)
Dept: PODIATRY | Facility: OTHER | Age: 64
End: 2019-07-02
Attending: PODIATRIST
Payer: MEDICARE

## 2019-07-02 VITALS
DIASTOLIC BLOOD PRESSURE: 80 MMHG | RESPIRATION RATE: 18 BRPM | HEIGHT: 65 IN | BODY MASS INDEX: 31.49 KG/M2 | WEIGHT: 189 LBS | SYSTOLIC BLOOD PRESSURE: 145 MMHG | TEMPERATURE: 97 F | HEART RATE: 79 BPM | OXYGEN SATURATION: 95 %

## 2019-07-02 DIAGNOSIS — L60.3 ONYCHODYSTROPHY: Primary | ICD-10-CM

## 2019-07-02 DIAGNOSIS — Z72.0 TOBACCO ABUSE: ICD-10-CM

## 2019-07-02 DIAGNOSIS — I70.203 ATHEROSCLEROSIS OF ARTERY OF BOTH LOWER EXTREMITIES (H): ICD-10-CM

## 2019-07-02 DIAGNOSIS — E11.42 DIABETIC POLYNEUROPATHY ASSOCIATED WITH TYPE 2 DIABETES MELLITUS (H): ICD-10-CM

## 2019-07-02 DIAGNOSIS — E11.9 DIABETES MELLITUS TYPE 2, NONINSULIN DEPENDENT (H): ICD-10-CM

## 2019-07-02 DIAGNOSIS — M21.969 LOSS OF PROTECTIVE SENSATION OF SKIN OF DEFORMED FOOT: ICD-10-CM

## 2019-07-02 DIAGNOSIS — Z71.6 TOBACCO ABUSE COUNSELING: ICD-10-CM

## 2019-07-02 DIAGNOSIS — R20.8 LOSS OF PROTECTIVE SENSATION OF SKIN OF DEFORMED FOOT: ICD-10-CM

## 2019-07-02 DIAGNOSIS — L84 CALLUS OF FOOT: ICD-10-CM

## 2019-07-02 PROCEDURE — 99213 OFFICE O/P EST LOW 20 MIN: CPT | Mod: 25 | Performed by: PODIATRIST

## 2019-07-02 PROCEDURE — 11056 PARNG/CUTG B9 HYPRKR LES 2-4: CPT | Mod: Q8 | Performed by: PODIATRIST

## 2019-07-02 PROCEDURE — G0463 HOSPITAL OUTPT CLINIC VISIT: HCPCS | Mod: 25

## 2019-07-02 PROCEDURE — 11721 DEBRIDE NAIL 6 OR MORE: CPT | Performed by: PODIATRIST

## 2019-07-02 PROCEDURE — G0463 HOSPITAL OUTPT CLINIC VISIT: HCPCS

## 2019-07-02 ASSESSMENT — PAIN SCALES - GENERAL: PAINLEVEL: MODERATE PAIN (4)

## 2019-07-02 ASSESSMENT — MIFFLIN-ST. JEOR: SCORE: 1403.18

## 2019-07-02 NOTE — PATIENT INSTRUCTIONS
-Diabetic Foot Education:  ---Check the feet daily looking for new new blisters or wounds  ---Wear shoes at all times when walking including around the house  ---Avoid lotion application between the toes.   ---If you have any open wounds with signs of infection (redness, swelling, pain, purulence, fever, chills, nausea, vomiting), go to the Emergency Department as soon as possible.    HOW TO QUIT SMOKING  Smoking is one of the hardest habits to break. About half of all those who have ever smoked have been able to quit, and most of those (about 70%) who still smoke want to quit. Here are some of the best ways to stop smoking.     KEEP TRYING:  It takes most smokers about 8 tries before they are finally able to fully quit. So, the more often you try and fail, the better your chance of quitting the next time! So, don't give up!    GO COLD TURKEY:  Most ex-smokers quit cold turkey. Trying to cut back gradually doesn't seem to work as well, perhaps because it continues the smoking habit. Also, it is possible to fool yourself by inhaling more while smoking fewer cigarettes. This results in the same amount of nicotine in your body!    GET SUPPORT:  Support programs can make an important difference, especially for the heavy smoker. These groups offer lectures, methods to change your behavior and peer support. Call the free national Quitline for more information. 800-QUIT-NOW (668-883-4612). Low-cost or free programs are offered by many hospitals, local chapters of the American Lung Association (320-728-5062) and the American Cancer Society (113-942-6133). Support at home is important too. Non-smokers can help by offering praise and encouragement. If the smoker fails to quit, encourage them to try again!    OVER-THE-COUNTER MEDICINES:  For those who can't quit on their own, Nicotine Replacement Therapy (NRT) may make quitting much easier. Certain aids such as the nicotine patch, gum and lozenge are available without a  prescription. However, it is best to use these under the guidance of your doctor. The skin patch provides a steady supply of nicotine to the body. Nicotine gum and lozenge gives temporary bursts of low levels of nicotine. Both methods take the edge off the craving for cigarettes. WARNING: If you feel symptoms of nicotine overdose, such as nausea, vomiting, dizziness, weakness, or fast heartbeat, stop using these and see your doctor.    PRESCRIPTION MEDICINES:  After evaluating your smoking patterns and prior attempts at quitting, your doctor may offer a prescription medicine such as bupropion (Zyban, Wellbutrin), varenicline (Chantix, Champix), a niocotine inhaler or nasal spray. Each has its unique advantage and side effects which your doctor can review with you.    HEALTH BENEFITS OF QUITTING:  The benefits of quitting start right away and keep improving the longer you go without smokin minutes: blood pressure and pulse return to normal  8 hours: oxygen levels return to normal  2 days: ability to smell and taste begins to improve as damaged nerves start to regrow  2-3 weeks: circulation and lung function improves  1-9 months: decreased cough, congestion and shortness of breath; less tired  1 year: risk of heart attack decreases by half  5 years: risk of lung cancer decreases by half; risk of stroke becomes the same as a non-smoker  For information about how to quit smoking, visit the following links:  [employer] ,   Clearing the Air, Quit Smoking Today   - an online booklet. http://www.smokefree.gov/pubs/clearing_the_air.pdf  Smokefree.gov http://smokefree.gov/  QuitNet http://www.quitnet.com/    [id number] Luanne Acuña, [address] [place] [address]. All rights reserved. This information is not intended as a substitute for professional medical care. Always follow your healthcare professional's instructions.    The Benefits of Living Smoke Free  What do you want to gain from  quitting? Check off some reasons to quit.  Health Benefits  ___ Reduce my risk of lung cancer, heart disease, chronic lung disease  ___ Have fewer wrinkles and softer skin  ___ Improve my sense of taste and smell  ___ For pregnant women--reduce the risk of having a miscarriage, stillbirth, premature birth, or low-birth-weight baby  Personal Benefits  ___ Feel more in control of my life  ___ Have better-smelling hair, breath, clothes, home, and car  ___ Save time by not having to take smoke breaks, buy cigarettes, or hunt for a light  ___ Have whiter teeth  Family Benefits  ___ Reduce my children s respiratory tract infections  ___ Set a good example for my children  ___ Reduce my family s cancer risk  Financial Benefits  ___ Save hundreds of dollars each year that would be spent on cigarettes  ___ Save money on medical bills  ___ Save on life, health, and car insurance premiums    Those Dollars Add Up!  Cigarettes are expensive, and getting more expensive all the time. Do you realize how much money you are spending on cigarettes per year? What is the average amount you spend on a pack of cigarettes? What is the average number of packs that you smoke per day? Using your answers to these questions, fill in this formula to help you find out:  ($ _____ per pack) ×  ( _____ number of packs per day) × (365 days) =  $ _____ yearly cost of smoking  Besides tobacco, there are other costs, including extra cleaning bills and replacement costs for clothing and furniture; medical expenses for smoking-related illnesses; and higher health, life, and car insurance premiums.    Cigars and Pipes Count Too!  Cigars and pipes are also dangerous. So are smokeless (chewing) tobacco and snuff. All of these products contain nicotine, a highly addictive substance that has harmful effects on your body. Quitting smoking means giving up all tobacco products.      [id number] Luanne Acuña, [address] [facility], [place] [address]. All rights  reserved. This information is not intended as a substitute for professional medical care. Always follow your healthcare professional's instructions.

## 2019-07-02 NOTE — PROGRESS NOTES
"Chief complaint: Patient presents with:  RECHECK: Diabetic foot check , toenail trimming       History of Present Illness: This 63 year old NIDDM female is seen for follow-up management of bilateral diabetic foot care. She still relates to \"big time\" tingling, burning and numbness in both feet. Her feet hurt at a 4 1/2 out of 10 on a VAS pain scale when she is on the Gabapentin and her pain goes up to a 10 1/2 without the Gabapentin. She saw the orthotist, Cheryl Mejia for DM shoes, but the orthotist only had tennis shoes available, and she only wants slip on Croc style shoes.     She still uses an oil on her feet and it helps a lot with the night pain. She also has difficulty trimming her own toenails. She presents in an open heeled Croc shoe because it is comfortable. She has not worn DM shoes in the past and she finds tennis shoes uncomfortable.     She has smoked 1 ppd for about a long time. She says she was continuously called the last time the Quit Plan was referred, so she does not want a quit plan referral.  Last HbA1C was up from 8.8% on 2/08/2019 to 9.0% on 05/20/2019. She says she is trying to avoid taking insulin and she is working on getting her blood sugars lowered. No further pedal complaints today.     /80 (BP Location: Right arm, Patient Position: Sitting, Cuff Size: Adult Large)   Pulse 79   Temp 97  F (36.1  C) (Tympanic)   Resp 18   Ht 1.651 m (5' 5\")   Wt 85.7 kg (189 lb)   SpO2 95%   BMI 31.45 kg/m      Patient Active Problem List   Diagnosis     Diabetes mellitus, type 2 (H)     Essential hypertension     Hearing loss     Hypercholesterolemia     Nerve pain       Past Surgical History:   Procedure Laterality Date     ARTHROPLASTY HIP Left 04/2015    fall--fracture     ARTHROSCOPY KNEE Left 01/2016    lateral meniscus removal and patellar and trochlear chondroplasty     ARTHROSCOPY SHOULDER ROTATOR CUFF REPAIR Right      CHOLECYSTECTOMY       COLONOSCOPY  10/08/2012    Colonoscopy " f/u 2017     ELBOW SURGERY Left 08/2009    Dr Amaya     EYE SURGERY      Previous eye surgery x 2, one time she had a fish hook in her eye     HYSTERECTOMY TOTAL ABDOMINAL, BILATERAL SALPINGO-OOPHORECTOMY, COMBINED  1989    benign reasons     LAPAROSCOPIC SUSPENSION BLADDER NECK      Dr Doan     LITHOTRIPSY  01/2011    Dr. Doan ([place]), three times       Current Outpatient Medications   Medication     albuterol (PROAIR HFA/PROVENTIL HFA/VENTOLIN HFA) 108 (90 BASE) MCG/ACT Inhaler     amLODIPine (NORVASC) 5 MG tablet     aspirin (ASA) 81 MG tablet     atenolol (TENORMIN) 100 MG tablet     blood glucose monitoring (NO BRAND SPECIFIED) meter device kit     Blood Glucose Monitoring Suppl (TRUE METRIX METER) w/Device KIT     brimonidine (ALPHAGAN) 0.2 % ophthalmic solution     cetirizine (ZYRTEC) 10 MG tablet     diclofenac (VOLTAREN) 1 % topical gel     fish oil-omega-3 fatty acids 1000 MG capsule     gabapentin (NEURONTIN) 600 MG tablet     glipiZIDE (GLUCOTROL) 10 MG tablet     levothyroxine (SYNTHROID/LEVOTHROID) 150 MCG tablet     lisinopril-hydrochlorothiazide (PRINZIDE/ZESTORETIC) 20-25 MG per tablet     metFORMIN (GLUCOPHAGE) 1000 MG tablet     Misc. Devices (ROLLER WALKER) MISC     order for DME     order for DME     pioglitazone (ACTOS) 15 MG tablet     pioglitazone (ACTOS) 30 MG tablet     rosuvastatin (CRESTOR) 20 MG tablet     triamcinolone (KENALOG) 0.5 % external ointment     No current facility-administered medications for this visit.           Allergies   Allergen Reactions     Oxycodone Itching and Nausea and Vomiting     Simvastatin GI Disturbance     Atorvastatin GI Disturbance     Bicillin C-R, Itching     Codeine Hives     Erythromycin Nausea and Vomiting     No Clinical Screening - See Comments Other (See Comments)     Oral contraceptives- didn't work     Pravastatin      Other reaction(s): Edema  Muscle aching and some swelling and edema snehal-orbitally.      Procaine Unknown      Delayed reaction- stays in system for long time     Sulfa Drugs      Other reaction(s): Edema       Family History   Problem Relation Age of Onset     Cancer Father         Cancer, lung cancer.     Coronary Artery Disease Father      Heart Disease Mother         Heart Disease,CAD     Other - See Comments Mother         Stroke/Alzheimer's     Diabetes Mother         Diabetes,type II     Breast Cancer Mother         Cancer-breast,metastatic breast cancer     Diabetes Brother         Diabetes, blind     Heart Disease Brother         Heart Disease, heart disease     Diabetes Brother         Diabetes,type II     Heart Disease Brother         Heart Disease,heart disease     Substance Abuse Brother         Drug Abuse,chemical dependency w/ drugs     Diabetes Brother         Diabetes, blind     Other - See Comments Brother         Psychiatric illness,Bipolar/Stroke, CVA     Heart Disease Brother         Heart Disease, MI and cardiac stents,     Multiple Sclerosis Daughter      No Known Problems Half-Sister        Social History     Socioeconomic History     Marital status:      Spouse name: Not on file     Number of children: Not on file     Years of education: Not on file     Highest education level: Not on file   Occupational History     Not on file   Social Needs     Financial resource strain: Not on file     Food insecurity:     Worry: Not on file     Inability: Not on file     Transportation needs:     Medical: Not on file     Non-medical: Not on file   Tobacco Use     Smoking status: Current Every Day Smoker     Packs/day: 0.25     Years: 40.00     Pack years: 10.00     Types: Cigarettes     Last attempt to quit: 2014     Years since quittin.7     Smokeless tobacco: Never Used     Tobacco comment: Quit smoking: Trying to quit   Substance and Sexual Activity     Alcohol use: No     Drug use: No     Comment: Drug use: No     Sexual activity: Not Currently   Lifestyle     Physical activity:     Days per  week: Not on file     Minutes per session: Not on file     Stress: Not on file   Relationships     Social connections:     Talks on phone: Not on file     Gets together: Not on file     Attends Mandaen service: Not on file     Active member of club or organization: Not on file     Attends meetings of clubs or organizations: Not on file     Relationship status: Not on file     Intimate partner violence:     Fear of current or ex partner: Not on file     Emotionally abused: Not on file     Physically abused: Not on file     Forced sexual activity: Not on file   Other Topics Concern     Not on file   Social History Narrative     August 8, 2008, [employer]. Retired. Three kids, Yessica in [place], Ashok in [place], Altaf in [place].       ROS: 10 point ROS neg other than the symptoms noted above in the HPI.  EXAM  Constitutional: healthy, alert and no distress    Psychiatric: mentation appears normal and affect normal/bright    VASCULAR:  -Dorsalis pedis pulse +1/4 b/l  -Posterior tibial pulse +1/4 b/l  -Capillary refill time < 3 seconds to b/l hallux  -Hair growth Absent to b/l anterior legs and ankles  -Mild 1+ pitting edema to bilateral ankles  NEURO:  -Light touch sensation intact to b/l plantar forefoot  -Protective sensation intact with SWM +7/10 RIGHT and +6/10 LEFT on 07/02/201  DERM:  -Mild hyperkeratotic lesion to RIGHT sub 2nd metatarsal head and LEFT medial hallux IPJ -- no wound post paring  -Skin moderately warm to bilateral feet and legs with mild erythema diffusely to bilateral feet  -Skin atrophic, dry and flaking  -Toenails elongated, dystrophic and discolored x 10  MSK:  -Muscle strength of ankles +5/5 for dorsiflexion, plantarflexion, ABDUction and ADDuction b/l    ============================================================    ASSESSMENT:  (L60.3) Onychodystrophy  (primary encounter diagnosis)    (L84) Callus of foot    (I70.203) Atherosclerosis of artery of both lower extremities  (H)    (E11.9) Diabetes mellitus type 2, noninsulin dependent (H)    (E11.42) Diabetic polyneuropathy associated with type 2 diabetes mellitus (H)    (M21.969,  R20.8) Loss of protective sensation of skin of deformed foot    (Z72.0) Tobacco abuse    (Z71.6) Tobacco abuse counseling        PLAN:  -Patient evaluated and examined. Treatment options discussed with no educational barriers noted.  --Diabetic Foot Education provided. This included checking the feet daily looking for new new blisters or wounds, wearing shoes at all times when walking including around the house, and avoiding lotion application between the toes. Any sign of infection in the foot warrant's the patient presenting to the ED as soon as possible.   -Diabetic shoes ordered on 04/22/2019--patient did not like any of the shoe choices. She will look in [place] and [place] on her own time.She says her Crocs are comfortable  -Diabetic Foot Education provided. This included checking the feet daily looking for new new blisters or wounds, wearing shoes at all times when walking including around the house, and avoiding lotion application between the toes. Any sign of infection in the foot warrant's the patient presenting to the ED as soon as possible.  -Tobacco cessation discussed with patient including the benefits of quitting and the risks of not quitting. The Quit Plan referral was offered and patient stated she was called by them too many times in the past and she does not want this order placed again. Patient is not not interested in quitting today.  -Nails debrided x 10 without incident  -Callus pared x 2 to RIGHT plantar 2nd met head and LEFT medial hallux IPJ without incident  ---Patient reminded that the callus will likely return due to the underlying, prominent bone causing the callus while the patient is walking.  -Initial exam for patient with LOPS  ---Patient previously had sensation  -Patient in agreement with the above treatment plan and  all of patient's questions were answered.      RTC three months for high risk nail debridement        Eileen Morales DPM

## 2019-07-02 NOTE — NURSING NOTE
"Chief Complaint   Patient presents with     RECHECK     Diabetic foot check , toenail trimming        Initial /80 (BP Location: Right arm, Patient Position: Sitting, Cuff Size: Adult Large)   Pulse 79   Temp 97  F (36.1  C) (Tympanic)   Resp 18   Ht 1.651 m (5' 5\")   Wt 85.7 kg (189 lb)   SpO2 95%   BMI 31.45 kg/m   Estimated body mass index is 31.45 kg/m  as calculated from the following:    Height as of this encounter: 1.651 m (5' 5\").    Weight as of this encounter: 85.7 kg (189 lb).  Medication Reconciliation: complete   Magda Mcfarland LPN      "

## 2019-07-08 ENCOUNTER — OFFICE VISIT (OUTPATIENT)
Dept: INTERNAL MEDICINE | Facility: OTHER | Age: 64
End: 2019-07-08
Attending: INTERNAL MEDICINE
Payer: COMMERCIAL

## 2019-07-08 VITALS
RESPIRATION RATE: 18 BRPM | HEART RATE: 78 BPM | DIASTOLIC BLOOD PRESSURE: 80 MMHG | SYSTOLIC BLOOD PRESSURE: 136 MMHG | OXYGEN SATURATION: 98 %

## 2019-07-08 DIAGNOSIS — E11.9 TYPE 2 DIABETES MELLITUS WITHOUT COMPLICATION, WITHOUT LONG-TERM CURRENT USE OF INSULIN (H): Primary | ICD-10-CM

## 2019-07-08 DIAGNOSIS — Z79.899 POLYPHARMACY: ICD-10-CM

## 2019-07-08 DIAGNOSIS — Z59.9 FINANCIAL PROBLEMS: ICD-10-CM

## 2019-07-08 PROCEDURE — G0463 HOSPITAL OUTPT CLINIC VISIT: HCPCS

## 2019-07-08 PROCEDURE — 99213 OFFICE O/P EST LOW 20 MIN: CPT | Performed by: INTERNAL MEDICINE

## 2019-07-08 RX ORDER — PIOGLITAZONEHYDROCHLORIDE 30 MG/1
30 TABLET ORAL EVERY MORNING
Qty: 30 TABLET | Refills: 1 | Status: SHIPPED | OUTPATIENT
Start: 2019-07-08 | End: 2019-07-25

## 2019-07-08 RX ORDER — PIOGLITAZONEHYDROCHLORIDE 15 MG/1
15 TABLET ORAL EVERY EVENING
Qty: 30 TABLET | Refills: 1 | Status: SHIPPED | OUTPATIENT
Start: 2019-07-08 | End: 2019-07-25

## 2019-07-08 SDOH — ECONOMIC STABILITY - INCOME SECURITY: PROBLEM RELATED TO HOUSING AND ECONOMIC CIRCUMSTANCES, UNSPECIFIED: Z59.9

## 2019-07-08 ASSESSMENT — PAIN SCALES - GENERAL: PAINLEVEL: NO PAIN (0)

## 2019-07-08 NOTE — PATIENT INSTRUCTIONS
Come to the pharmacy on Wednesday July 17 between 1 and 2 pm for review of medications and discussion of pricing.

## 2019-07-12 NOTE — PROGRESS NOTES
Chief Complaint   Patient presents with     Recheck Medication         HPI: Ms. Bennett is a 63 year old female who presents today for follow up of medications.    She does have significant difficulty with affording her medications.  We have been trying to work  out a drug regimen that she can afford for her diabetes.  She currently gets most of her medications from [facility].  She is interested in possibly changing pharmacies.  She has not had any issues with her current medications.  The cost of her medications was reviewed in depth today.    She  reports that she has been smoking cigarettes.  She has a 10.00 pack-year smoking history. She has never used smokeless tobacco.    Past medical history reviewed as below:     Past Medical History:   Diagnosis Date     Atopic dermatitis 08/20/2013     Autoimmune thyroiditis     [facility]     Benign paroxysmal vertigo 03/21/2013     Calculus of kidney 01/22/2011     Closed fracture of neck of femur (H) 05/07/2015    after a fall     Disorder of cartilage      Essential hypertension 10/20/2015     Hearing loss      Lateral epicondylitis 7/25/2006    Overview:  IMO Update 10/11     Other intervertebral disc degeneration, lumbosacral region     No Comments Provided     Other specified symptoms and signs involving the circulatory and respiratory systems 08/02/2012     Peripheral vascular disease (H) 08/02/2012    with claudication     Polyp of colon     with low grade colon polyp at the hepatic flexure and at 22 cm.; sessile serrated adenoma with low grade dysplasia colon polyp at the splenic flexure.     Pregnancy     G4, P3, A questionable 1, and 1 stillborn of a twin     Pure hypercholesterolemia      Shoulder impingement, right 09/15/2010     Tobacco use      Type 2 diabetes mellitus without complications (H)    .      ROS  Pertinent ROS was performed and was negative, including for fever, chills.  No other concerns, with exception of HPI above.      EXAM:   BP  "136/80 (BP Location: Right arm, Patient Position: Sitting, Cuff Size: Adult Regular)   Pulse 78   Resp 18   SpO2 98%   Breastfeeding? No     Estimated body mass index is 31.45 kg/m  as calculated from the following:    Height as of 7/2/19: 1.651 m (5' 5\").    Weight as of 7/2/19: 85.7 kg (189 lb).      GEN: Vitals reviewed. Healthy appearing. Patient is in no acute distress. Cooperative with exam.  HEENT: Normocephalic atraumatic.  Pupils equally round.  No scleral icterus, no conjunctival erythema.   PSYCH: Mood is good.  Affect appropriate. Speech fluent. Answers questions appropriately and thought process normal.     ASSESSMENT AND PLAN:    Type 2 diabetes mellitus without complication, without long-term current use of insulin (H)  -At this time we will continue on her current medications.  New prescription is sent for Actos as she is almost out of this.  While she was here today we did call the retail pharmacy here at the hospital and set her up with a medication management appointment next week.  Following this we will determine a good regimen for her diabetes.  Additionally if she would like after meeting with Pharm D she can have her medications transferred.  - pioglitazone (ACTOS) 15 MG tablet  Dispense: 30 tablet; Refill: 1  - pioglitazone (ACTOS) 30 MG tablet  Dispense: 30 tablet; Refill: 1    Polypharmacy  -We will try to minimize medications as much as possible at this time she will continue with her current medications.  New prescription sent out for Actos that she    Financial problems  -See above         Return if symptoms worsen or fail to improve.    A total of 16 minutes spent with in face-to-face consultation of this patient with greater than 50% spent in counseling and care coordination of above listed medical problems including diagnosis, treatment options with emphasis on risks and benefits of each,prognosis and importance of compliance for each.      ADITHYA VALERA DO   7/11/2019 11:02 " PM    This document was prepared using voice generated softwear. While every attempt was made for accuracy, grammatical errors may exist.

## 2019-07-23 DIAGNOSIS — I10 BENIGN ESSENTIAL HYPERTENSION: ICD-10-CM

## 2019-07-24 RX ORDER — ATENOLOL 100 MG/1
TABLET ORAL
Qty: 180 TABLET | Refills: 3 | Status: SHIPPED | OUTPATIENT
Start: 2019-07-24 | End: 2020-08-10

## 2019-07-24 NOTE — TELEPHONE ENCOUNTER
"Requested Prescriptions   Pending Prescriptions Disp Refills     atenolol (TENORMIN) 100 MG tablet [Pharmacy Med Name: ATENOLOL 100 MG TABLET] 180 tablet 1     Sig: TAKE 1 TABLET BY MOUTH 2 TIMES DAILY.       Beta-Blockers Protocol Passed - 7/23/2019  8:53 AM        Passed - Blood pressure under 140/90 in past 12 months     BP Readings from Last 3 Encounters:   07/08/19 136/80   07/02/19 145/80   05/20/19 120/60                 Passed - Patient is age 6 or older        Passed - Recent (12 mo) or future (30 days) visit within the authorizing provider's specialty     Patient had office visit in the last 12 months or has a visit in the next 30 days with authorizing provider or within the authorizing provider's specialty.  See \"Patient Info\" tab in inbasket, or \"Choose Columns\" in Meds & Orders section of the refill encounter.              Passed - Medication is active on med list        LOV 7/8/19    Prescription approved per [facility].    "

## 2019-07-25 ENCOUNTER — OFFICE VISIT (OUTPATIENT)
Dept: OBGYN | Facility: OTHER | Age: 64
End: 2019-07-25
Attending: OBSTETRICS & GYNECOLOGY
Payer: COMMERCIAL

## 2019-07-25 VITALS
DIASTOLIC BLOOD PRESSURE: 70 MMHG | HEART RATE: 72 BPM | SYSTOLIC BLOOD PRESSURE: 124 MMHG | WEIGHT: 189 LBS | BODY MASS INDEX: 31.45 KG/M2

## 2019-07-25 DIAGNOSIS — E11.9 TYPE 2 DIABETES MELLITUS WITHOUT COMPLICATION, UNSPECIFIED WHETHER LONG TERM INSULIN USE (H): ICD-10-CM

## 2019-07-25 DIAGNOSIS — E11.9 TYPE 2 DIABETES MELLITUS WITHOUT COMPLICATION, WITHOUT LONG-TERM CURRENT USE OF INSULIN (H): ICD-10-CM

## 2019-07-25 DIAGNOSIS — N90.89 LESION OF VULVA: Primary | ICD-10-CM

## 2019-07-25 PROCEDURE — 99213 OFFICE O/P EST LOW 20 MIN: CPT | Performed by: OBSTETRICS & GYNECOLOGY

## 2019-07-25 PROCEDURE — G0463 HOSPITAL OUTPT CLINIC VISIT: HCPCS

## 2019-07-25 ASSESSMENT — PAIN SCALES - GENERAL: PAINLEVEL: NO PAIN (0)

## 2019-07-25 NOTE — PROGRESS NOTES
CC: Abscess  HPI:  Narda is a 64 year old postmenopausal female with multiple comorbidities.  who presents for a possible abscess on her right labia that she noticed about 3 days ago. She has been unable to inspect it due to the location, but she feels like there may be a scratch or a cut on the bump because it is now tender. Her  states that it is becoming more red and wanted her to come in and have it looked at. Her  has tried putting aloe vera on it and hydrogen peroxide, but states that it seems to be getting worse. Her  was not here today to discuss the lesion. She denies fevers, chills, nausea, vomiting, abdominal pain, dysuria, hematuria, and sexual activity within the last month. She is in a monogamous relationship with her  and has no concerns about STIs. She has never had anything like this before.       Exam:   General: Patient is alert and in no apparent distress. She is difficult to understand and is tangential.  Pelvic: Inspection of the external genitalia reveals an approximate 0.5 cm by 1 cm sore surrounded by mild edema that is tender to the touch located on the right labia minora. External genitalia otherwise normal. No bleeding, foul odor, or copious discharge.   Skin: There is a round non-tender mobile cyst about 1 cm in diameter on the inner right thigh.      Assessment:  1. Excoriation of labia    Plan:  1. Applied triple antibacterial ointment to the sore and gave patient samples so that she can continue this at home. Discussed avoidance of using strong soaps to clean the area.    > 15 minutes spent, majority in counseling

## 2019-07-25 NOTE — NURSING NOTE
Chief Complaint   Patient presents with     Consult     Abcess to Vaginal Area       Medication Reconciliation: completed   Mayela Granados LPN  7/25/2019 2:13 PM

## 2019-07-26 NOTE — TELEPHONE ENCOUNTER
Requested Prescriptions   Pending Prescriptions Disp Refills     metFORMIN (GLUCOPHAGE) 1000 MG tablet [Pharmacy Med Name: METFORMIN HCL 1000 MG TABLET] 70 tablet      Sig: TAKE 1 TABLET BY MOUTH 2 TIMES DAILY WITH MEALS.       Biguanide Agents Passed - 7/25/2019  5:27 PM        Passed - Blood pressure less than 140/90 in past 6 months     BP Readings from Last 3 Encounters:   07/25/19 124/70   07/08/19 136/80   07/02/19 145/80                 Passed - Patient has documented LDL within the past 12 mos.     Recent Labs   Lab Test 10/17/18  1609   LDL Cannot estimate LDL when triglyceride exceeds 400 mg/dL             Passed - Patient has had a Microalbumin in the past 15 mos.     Recent Labs   Lab Test 10/17/18  1613 12/01/16  2311   MICROL 26  --    UMALCR 24.77  --    [id number]  --  13.8             Passed - Patient is age 10 or older        Passed - Patient has documented A1c within the specified period of time.     If HgbA1C is 8 or greater, it needs to be on file within the past 3 months.  If less than 8, must be on file within the past 6 months.     Recent Labs   Lab Test 05/20/19  0927  12/01/16  0959   A1C 9.0*   < >  --    [id number]  --   --  8.5*    < > = values in this interval not displayed.             Passed - Patient's CR is NOT>1.4 OR Patient's EGFR is NOT<45 within past 12 mos.     Recent Labs   Lab Test 05/20/19  0927   GFRESTIMATED 75   GFRESTBLACK >90       Recent Labs   Lab Test 05/20/19  0927   CR 0.77             Passed - Patient does NOT have a diagnosis of CHF.        Passed - Medication is active on med list        Passed - Patient is not pregnant        Passed - Patient has not had a positive pregnancy test within the past 12 mos.         Passed - Recent (6 mo) or future (30 days) visit within the authorizing provider's specialty     Patient had office visit in the last 6 months or has a visit in the next 30 days with authorizing provider or within the authorizing provider's specialty.  See  "\"Patient Info\" tab in inbasket, or \"Choose Columns\" in Meds & Orders section of the refill encounter.            LOV 7/8/19    Last date of labs per protocol 5/20/19    Prescription approved per [facility].    "

## 2019-07-29 RX ORDER — PIOGLITAZONEHYDROCHLORIDE 30 MG/1
TABLET ORAL
Qty: 60 TABLET | Refills: 0 | Status: SHIPPED | OUTPATIENT
Start: 2019-07-29 | End: 2019-10-28

## 2019-07-29 RX ORDER — PIOGLITAZONEHYDROCHLORIDE 15 MG/1
TABLET ORAL
Qty: 60 TABLET | Refills: 0 | Status: SHIPPED | OUTPATIENT
Start: 2019-07-29 | End: 2019-12-16

## 2019-09-09 ENCOUNTER — TELEPHONE (OUTPATIENT)
Dept: INTERNAL MEDICINE | Facility: OTHER | Age: 64
End: 2019-09-09

## 2019-09-09 DIAGNOSIS — E11.42 TYPE 2 DIABETES MELLITUS WITH DIABETIC POLYNEUROPATHY, WITHOUT LONG-TERM CURRENT USE OF INSULIN (H): Primary | ICD-10-CM

## 2019-09-09 DIAGNOSIS — E11.9 TYPE 2 DIABETES MELLITUS WITHOUT COMPLICATION, WITHOUT LONG-TERM CURRENT USE OF INSULIN (H): ICD-10-CM

## 2019-09-09 NOTE — TELEPHONE ENCOUNTER
Tried to call patient but she is still running errands in town. Jardiance was discontinued on 6/21/2019. Wondering if patient is still taking this, or why she needs a refills.     Kelly Cat LPN............. September 9, 2019 1:41 PM

## 2019-09-09 NOTE — TELEPHONE ENCOUNTER
Patient requests order for A1C.  Also needs you to do a new rx for jardiance before her next fill in October.  Elsie Gould on 9/9/2019 at 1:31 PM

## 2019-09-09 NOTE — TELEPHONE ENCOUNTER
Patient left message on the [address] scheduling office voicemail stating that she will be out of the house until after we close and would like a call back to her answering machine and for a full message to be left.  The date of her last A1C and when she needs to schedule an appointment to have it checked again.  Tayler Gould on 9/9/2019 at 3:03 PM

## 2019-09-09 NOTE — TELEPHONE ENCOUNTER
Due to patient being gone the rest of today, this writer being out tomorrow, patient will receive a call back on Wednesday.     Kelly Cat LPN............. September 9, 2019 3:11 PM

## 2019-09-10 NOTE — TELEPHONE ENCOUNTER
Spoke to patient.  She was wondering what was the date of her last A1C.  This writer informed patient of that date.  Patient also requested to be transferred to scheduling to make an appointment.   This writer inquired on patient's request for a Jardiance prescription. Patient states she was put on that for a trial and used [facility].  She states that the prescription was $200 and she could not afford that.  They transferred the prescription to [facility] and that was $70.  She states that Clarence from the pharmacy stated that if Patsy Gordon DO wrote her a new prescription they will do paperwork and hopefully she can get a roseann to help cover the cost.  Olga Duncan LPN 9/10/2019   10:10 AM

## 2019-09-12 DIAGNOSIS — E11.9 TYPE 2 DIABETES MELLITUS WITHOUT COMPLICATION, WITHOUT LONG-TERM CURRENT USE OF INSULIN (H): ICD-10-CM

## 2019-09-12 LAB — HBA1C MFR BLD: 8.1 % (ref 4–6)

## 2019-09-12 PROCEDURE — 83036 HEMOGLOBIN GLYCOSYLATED A1C: CPT | Mod: ZL | Performed by: INTERNAL MEDICINE

## 2019-09-12 PROCEDURE — 36415 COLL VENOUS BLD VENIPUNCTURE: CPT | Mod: ZL | Performed by: INTERNAL MEDICINE

## 2019-10-07 ENCOUNTER — OFFICE VISIT (OUTPATIENT)
Dept: INTERNAL MEDICINE | Facility: OTHER | Age: 64
End: 2019-10-07
Attending: INTERNAL MEDICINE
Payer: COMMERCIAL

## 2019-10-07 VITALS
TEMPERATURE: 97.6 F | DIASTOLIC BLOOD PRESSURE: 70 MMHG | HEART RATE: 78 BPM | WEIGHT: 183 LBS | OXYGEN SATURATION: 96 % | HEIGHT: 65 IN | RESPIRATION RATE: 16 BRPM | SYSTOLIC BLOOD PRESSURE: 128 MMHG | BODY MASS INDEX: 30.49 KG/M2

## 2019-10-07 DIAGNOSIS — E03.9 HYPOTHYROIDISM, UNSPECIFIED TYPE: ICD-10-CM

## 2019-10-07 DIAGNOSIS — E11.65 UNCONTROLLED TYPE 2 DIABETES MELLITUS WITH HYPERGLYCEMIA (H): Primary | ICD-10-CM

## 2019-10-07 DIAGNOSIS — I10 BENIGN ESSENTIAL HYPERTENSION: ICD-10-CM

## 2019-10-07 PROCEDURE — G0463 HOSPITAL OUTPT CLINIC VISIT: HCPCS

## 2019-10-07 PROCEDURE — 99214 OFFICE O/P EST MOD 30 MIN: CPT | Performed by: INTERNAL MEDICINE

## 2019-10-07 RX ORDER — LEVOTHYROXINE SODIUM 150 UG/1
150 TABLET ORAL DAILY
Qty: 90 TABLET | Refills: 1 | Status: SHIPPED | OUTPATIENT
Start: 2019-10-07 | End: 2020-04-27

## 2019-10-07 RX ORDER — LISINOPRIL AND HYDROCHLOROTHIAZIDE 20; 25 MG/1; MG/1
1 TABLET ORAL DAILY
Qty: 90 TABLET | Refills: 1 | Status: SHIPPED | OUTPATIENT
Start: 2019-10-07 | End: 2020-04-27

## 2019-10-07 RX ORDER — GLIPIZIDE AND METFORMIN HCL 5; 500 MG/1; MG/1
2 TABLET, FILM COATED ORAL
Qty: 180 TABLET | Refills: 1 | Status: SHIPPED | OUTPATIENT
Start: 2019-10-07 | End: 2020-01-03

## 2019-10-07 ASSESSMENT — MIFFLIN-ST. JEOR: SCORE: 1370.96

## 2019-10-07 ASSESSMENT — PAIN SCALES - GENERAL: PAINLEVEL: NO PAIN (0)

## 2019-10-07 NOTE — PATIENT INSTRUCTIONS
Take your thyroid med in morning, fasting 1 hour before any other meds or food.       combination metformin and glipizide if cheaper than medications separately.    Continue other meds for now.    Work to get A1C close to 7 and we will discontinue Jardiance    Patient Education     Diet: Diabetes  Food is an important tool that you can use to control diabetes and stay healthy. Eating well-balanced meals in the correct amounts will help you control your blood glucose levels and prevent low blood sugar reactions. It will also help you reduce the health risks of diabetes. There is no one specific diet that is right for everyone with diabetes. But there are general guidelines to follow. A registered dietitian (PREM) will create a tailored diet approach that s just right for you. He or she will also help you plan healthy meals and snacks. If you have any questions, call your dietitian for advice.     Guidelines for success  Talk with your healthcare provider before starting a diabetes diet or weight loss program. If you haven't talked with a dietitian yet, ask your provider for a referral. The following guidelines can help you succeed:    Select foods from the 6 food groups below. Your dietitian will help you find food choices within each group. He or she will also show you serving sizes and how many servings you can have at each meal.  ? Grains, beans, and starchy vegetables  ? Vegetables  ? Fruit  ? Milk or yogurt  ? Meat, poultry, fish, or tofu  ? Healthy fats    Check your blood sugar levels as directed by your provider. Take any medicine as prescribed by your provider.    Learn to read food labels and pick the right portion sizes.    Eat only the amount of food in your meal plan. Eat about the same amount of food at regular times each day. Don t skip meals. Eat meals 4 to 5 hours apart, with snacks in between.    Limit alcohol. It raises blood sugar levels. Drink water or calorie-free diet drinks that use safe  sweeteners.    Eat less fat to help lower your risk of heart disease. Use nonfat or low-fat dairy products and lean meats. Avoid fried foods. Use cooking oils that are unsaturated, such as olive, canola, or peanut oil.    Talk with your dietitian about safe sugar substitutes.    Avoid added salt. It can contribute to high blood pressure, which can cause heart disease. People with diabetes already have a risk of high blood pressure and heart disease.    Stay at a healthy weight. If you need to lose weight, cut down on your portion sizes. But don t skip meals. Exercise is an important part of any weight management program. Talk with your provider about an exercise program that s right for you.    For more information about the best diet plan for you, talk with a registered dietitian (RD). To find an RD in your area, contact:  ? Academy of Nutrition and Dietetics www.eatright.org  ? The American Diabetes Association 274-200-6987 www.diabetes.org  Date Last Reviewed: 8/1/2016 2000-2018 [employer]. [address] [place] [address]. All rights reserved. This information is not intended as a substitute for professional medical care. Always follow your healthcare professional's instructions.    Patient Education     Healthy Meals for Diabetes     A healthcare provider will help you develop a meal plan that fits your needs.     Ask your healthcare team to help you make a meal plan that fits your needs. Your meal plan tells you when to eat your meals and snacks, what kinds of foods to eat, and how much of each food to eat. You don t have to give up all the foods you like. But you do need to follow some guidelines.  Choose healthy carbohydrates  Starches, sugars, and fiber are all types of carbohydrates. Fiber can help lower your cholesterol and triglycerides. Fiber is also healthy for your heart. You should have 20 to 35 grams of total fiber each day. Fiber-rich foods include:    Whole-grain breads  and cereals    Bulgur wheat    Brown rice       Whole-wheat pasta    Fruits and vegetables    Dry beans, and peas   Keep track of the amount of carbohydrates you eat. This can help you keep the right balance of physical activity and medicine. The amount of carbohydrates needed will vary for each person. It depends on many things such as your health, the medicines you take, and how active you are. Your healthcare team will help you figure out the right amount of carbohydrates for you. You may start with around 45 to 60 grams of carbohydrates per meal, depending on your situation.   Here are some examples of foods containing about 15 grams of carbohydrates (1 serving of carbohydrates):    1/2 cup of canned or frozen fruit    A small piece of fresh fruit (4 ounces)    1 slice of bread    1/2 cup of oatmeal    1/3 cup of rice    4 to 6 crackers    1/2 English muffin    1/2 cup of black beans    1/4 of a large baked potato (3 ounces)    2/3 cup of plain fat-free yogurt    1 cup of soup    1/2 cup of casserole    6 chicken nuggets    2-inch-square brownie or cake without frosting    2 small cookies    1/2 cup of ice cream or sherbet  Choose healthy protein foods  Eating protein that is low in fat can help you control your weight. It also helps keep your heart healthy. Low-fat protein foods include:    Fish    Plant proteins, such as dry beans and peas, nuts, and soy products like tofu and soymilk    Lean meat with all visible fat removed    Poultry with the skin removed    Low-fat or nonfat milk, cheese, and yogurt  Limit unhealthy fats and sugar  Saturated and trans fats are unhealthy for your heart. They raise LDL (bad) cholesterol. Fat is also high in calories, so it can make you gain weight. To cut down on unhealthy fats and sugar, limit these foods:    Butter or margarine    Palm and palm kernel oils and coconut oil    Cream    Cheese    Elaine    Lunch meats       Ice cream    Sweet bakery goods such as pies,  muffins, and donuts    Jams and jellies    Candy bars    Regular sodas   How much to eat  The amount of food you eat affects your blood sugar. It also affects your weight. Your healthcare team will tell you how much of each type of food you should eat.    Use measuring cups and spoons and a food scale to measure serving sizes.    Learn what a correct serving size looks like on your plate. This will help when you are away from home and can t measure your servings.    Eat only the number of servings given on your meal plan for each food. Don t take seconds.    Learn to read food labels. Be sure to look at serving size, total carbohydrates, fiber, calories, sugar, and saturated and trans fats. Look for healthier alternatives to foods that have added sugar.    Plan ahead for parties so you can still have a good time without going overboard with unhealthy food choices. Set a good example yourself by bringing a healthy dish to pot lucks.   Choose healthy snacks  When it comes to snacks, we usually think about foods with added sugar and fats. But there are many other options for healthier snack choices. Here are a few snack ideas to choose from:  Snacks with less than 5 grams of carbohydrates    1 piece of string cheese    3 celery sticks plus 1 tablespoon of peanut butter    5 cherry tomatoes plus 1 tablespoon of ranch dressing    1 hard-boiled egg    1/4 cup of fresh blueberries     5 baby carrots    1 cup of light popcorn    1/2 cup of sugar-free gelatin    15 almonds  Snacks with about 10 to 20 grams of carbohydrates    1/3 cup of hummus plus 1 cup of fresh cut nonstarchy vegetables (carrots, green peppers, broccoli, celery, or a combination)    1/2 cup of fresh or canned fruit plus 1/4 cup of cottage cheese    1/2 cup of tuna salad with 4 crackers    2 rice cakes and a tablespoon of peanut butter    1 small apple or orange    3 cups light popcorn    1/2 of a turkey sandwich (1 slice of whole-wheat bread, 2 ounces of  turkey, and mustard)  Portion sizes are important to controlling your blood sugar and staying at a healthy weight. Stock up on healthy snack items so you always have them on hand.  When to eat  Your meal plan will likely include breakfast, lunch, dinner, and some snacks.    Try to eat your meals and snacks at about the same times each day.    Eat all your meals and snacks. Skipping a meal or snack can make your blood sugar drop too low. It can also cause you to eat too much at the next meal or snack. Then your blood sugar could get too high.  Date Last Reviewed: 7/1/2016 2000-2018 [employer]. [address] [place] [address], [place] [address]. All rights reserved. This information is not intended as a substitute for professional medical care. Always follow your healthcare professional's instructions.

## 2019-10-07 NOTE — PROGRESS NOTES
"Nursing Notes:   Kelly Cat LPN  10/7/2019  3:22 PM  Signed  Patient presents to the clinic for diabetic follow-up.     Previous A1C is not at goal of <8    Lab Results   Component Value Date    A1C 8.1 09/12/2019       Patient has Type 2 Diabetes  Diabetes medications: Oral Medications: Glipizide - Dose: 1000 MG, Time: morning and evening and Metformin - Dose: 1000 MG, Time: morning and evening  Patient is not on a daily aspirin  Patient is on a Statin.  Blood glucose tests per day \"not at all\"  Urine microalbumin:creatine: 10/17/2018  Foot exam 04/01/2019  Eye exam 05/07/2019    Blood pressure today of 128/70 is at the goal of <139/89 for diabetics.    Tobacco User:   History   Smoking Status     Current Every Day Smoker     Packs/day: 0.25     Years: 40.00     Types: Cigarettes     Last attempt to quit: 8/1/2014   Smokeless Tobacco     Never Used     Comment: Quit smoking: Trying to quit       Other concerns today: Follow-up labs    Medication Reconciliation: complete     Kelly Cat LPN on 10/7/2019 at 2:55 PM    Chief Complaint   Patient presents with     Diabetes         HPI: Ms. Bennett is a 64 year old female who presents today for follow up of diabetes mellitus.    Nursing note reviewed with patient.  Accurracy and completeness verified.      Patients diabetes is uncontrolled.  She currently denies significant lows.  She is not checking her blood sugars regularly.  She is on several medications including glipizide, metformin, Jardiance, Actos. she denies any obvious side effects from diabetic medications.  She is interested in getting off of medications for her diabetes her her blood pressure is much as possible.  She is on a statin and aspirin.  She does continue to smoke.    We discussed that likely she would have to make pretty significant lifestyle changes in order to accomplish this.  Her weight is down 6 pounds since July and she was congratulated on this.    She is due for " "renewal of her thyroid medication.  She denies any signs of high or low thyroid.  She would like this sent to a new pharmacy.    Past medical history reviewed as below:     Past Medical History:   Diagnosis Date     Atopic dermatitis 08/20/2013     Autoimmune thyroiditis     Hashimotos     Benign paroxysmal vertigo 03/21/2013     Calculus of kidney 01/22/2011     Closed fracture of neck of femur (H) 05/07/2015    after a fall     Disorder of cartilage      Essential hypertension 10/20/2015     Hearing loss      Lateral epicondylitis 7/25/2006    Overview:  IMO Update 10/11     Other intervertebral disc degeneration, lumbosacral region     No Comments Provided     Other specified symptoms and signs involving the circulatory and respiratory systems 08/02/2012     Peripheral vascular disease (H) 08/02/2012    with claudication     Polyp of colon     with low grade colon polyp at the hepatic flexure and at 22 cm.; sessile serrated adenoma with low grade dysplasia colon polyp at the splenic flexure.     Pregnancy     G4, P3, A questionable 1, and 1 stillborn of a twin     Pure hypercholesterolemia      Shoulder impingement, right 09/15/2010     Tobacco use      Type 2 diabetes mellitus without complications (H)    .      ROS  Pertinent ROS was performed and was negative, including for fever, chills, chest pain, shortness of breath, increased lower extremity edema, changes in bowel or bladder, blood in the stool, difficulty swallowing, sores in the mouth. No other concerns, with exception of HPI above.      EXAM:   /70 (BP Location: Right arm, Patient Position: Sitting, Cuff Size: Adult Regular)   Pulse 78   Temp 97.6  F (36.4  C) (Tympanic)   Resp 16   Ht 1.651 m (5' 5\")   Wt 83 kg (183 lb)   SpO2 96%   Breastfeeding? No   BMI 30.45 kg/m      Estimated body mass index is 30.45 kg/m  as calculated from the following:    Height as of this encounter: 1.651 m (5' 5\").    Weight as of this encounter: 83 kg (183 " lb).      GEN: Vitals reviewed. Healthy appearing. Patient is in no acute distress. Cooperative with exam.  HEENT: Normocephalic atraumatic.  Pupils equally round.  No scleral icterus, no conjunctival erythema. Oropharynx with no erythema or exudates. Dentition adequate.  NECK: Supple; no thyromegaly.  No neck, cervical LAD.   CV: Heart regular in rate and rhythm with no murmur.    LUNGS: Lungs clear to auscultation bilaterally.  Chest rise equal bilaterally.  No accessory muscle use.  ABD:  Obese.  SKIN: Warm and dry to touch.  No rash on face, arms and legs.  EXT: No clubbing or cyanosis.  Trace lower ext peripheral edema.  PSYCH: Mood is good.  Affect appropriate. Speech fluent. Answers questions appropriately and thought process normal.    LABS: Personally ordered/reviewed  Results for orders placed or performed in visit on 09/12/19   Hemoglobin A1c   Result Value Ref Range    Hemoglobin A1C 8.1 (H) 4.0 - 6.0 %           ASSESSMENT AND PLAN:  Uncontrolled type 2 diabetes mellitus with hyperglycemia (H)  -She is interested in getting a continuous glucose monitor.  She does have Medicare and we can consider sending in a prescription.  If not covered it we will have her see diabetic education to pursue this.  She was informed that likely she would need to be checking her blood sugars regularly in order to obtain it.  -We will combine her glipizide and metformin in order to reduce pill load.  She will continue with other medications at this time.  She was given specific things to cut back on in order to help lower her blood sugars.  She was informed that if her blood sugar gets to an A1c of 6.5-7% that we can start discontinuing medications.  - glipiZIDE-metFORMIN (METAGLIP) 5-500 MG tablet  Dispense: 180 tablet; Refill: 1  - Hemoglobin A1c    Benign essential hypertension  -At this time she will continue with her current medications.  We discussed that increased weight loss would likely allow her to discontinue or  decrease medications.  - lisinopril-hydrochlorothiazide (PRINZIDE/ZESTORETIC) 20-25 MG tablet  Dispense: 90 tablet; Refill: 1  - Basic Metabolic Panel    Hypothyroidism, unspecified type  -Thyroid overall is stable.  Continue with current medication.  - levothyroxine (SYNTHROID/LEVOTHROID) 150 MCG tablet  Dispense: 90 tablet; Refill: 1           Return in about 3 months (around 1/7/2020) for Recheck diabetes, kidneys.    A total of 26 minutes spent with in face-to-face consultation of this patient with greater than 50% spent in counseling and care coordination of above listed medical problems including diagnosis, treatment options with emphasis on risks and benefits of each,prognosis and importance of compliance for each.      ADITHYA VALERA DO   10/7/2019 3:25 PM    This document was prepared using voice generated softwear. While every attempt was made for accuracy, grammatical errors may exist.

## 2019-10-07 NOTE — NURSING NOTE
"Patient presents to the clinic for diabetic follow-up.     Previous A1C is not at goal of <8    Lab Results   Component Value Date    A1C 8.1 09/12/2019       Patient has Type 2 Diabetes  Diabetes medications: Oral Medications: Glipizide - Dose: 1000 MG, Time: morning and evening and Metformin - Dose: 1000 MG, Time: morning and evening  Patient is not on a daily aspirin  Patient is on a Statin.  Blood glucose tests per day \"not at all\"  Urine microalbumin:creatine: 10/17/2018  Foot exam 04/01/2019  Eye exam 05/07/2019    Blood pressure today of 128/70 is at the goal of <139/89 for diabetics.    Tobacco User:   History   Smoking Status     Current Every Day Smoker     Packs/day: 0.25     Years: 40.00     Types: Cigarettes     Last attempt to quit: 8/1/2014   Smokeless Tobacco     Never Used     Comment: Quit smoking: Trying to quit       Other concerns today: Follow-up labs    Medication Reconciliation: complete     Kelly Cat LPN on 10/7/2019 at 2:55 PM  "

## 2019-10-09 RX ORDER — PROCHLORPERAZINE 25 MG/1
1 SUPPOSITORY RECTAL
Qty: 3 EACH | Refills: 3 | Status: SHIPPED | OUTPATIENT
Start: 2019-10-09

## 2019-10-09 RX ORDER — PROCHLORPERAZINE 25 MG/1
1 SUPPOSITORY RECTAL ONCE
Qty: 1 DEVICE | Refills: 0 | Status: SHIPPED | OUTPATIENT
Start: 2019-10-09 | End: 2019-11-07

## 2019-10-09 RX ORDER — PROCHLORPERAZINE 25 MG/1
1 SUPPOSITORY RECTAL
Qty: 1 EACH | Refills: 3 | Status: SHIPPED | OUTPATIENT
Start: 2019-10-09

## 2019-10-28 DIAGNOSIS — E11.9 TYPE 2 DIABETES MELLITUS WITHOUT COMPLICATION, WITHOUT LONG-TERM CURRENT USE OF INSULIN (H): ICD-10-CM

## 2019-10-30 RX ORDER — PIOGLITAZONEHYDROCHLORIDE 30 MG/1
TABLET ORAL
Qty: 30 TABLET | Refills: 1 | Status: SHIPPED | OUTPATIENT
Start: 2019-10-30 | End: 2019-12-16

## 2019-10-30 NOTE — TELEPHONE ENCOUNTER
Patient is out of medication and requesting a refill. Last office visit 10/7/19. Refilling 30 day supply + 1 refills per CPA.     Clarence Padilla [facility]

## 2019-11-05 ENCOUNTER — OFFICE VISIT (OUTPATIENT)
Dept: PODIATRY | Facility: OTHER | Age: 64
End: 2019-11-05
Attending: PODIATRIST
Payer: MEDICARE

## 2019-11-05 VITALS
RESPIRATION RATE: 18 BRPM | OXYGEN SATURATION: 95 % | HEART RATE: 73 BPM | SYSTOLIC BLOOD PRESSURE: 112 MMHG | TEMPERATURE: 96.6 F | DIASTOLIC BLOOD PRESSURE: 64 MMHG

## 2019-11-05 DIAGNOSIS — R20.8 LOSS OF PROTECTIVE SENSATION OF SKIN OF DEFORMED FOOT: ICD-10-CM

## 2019-11-05 DIAGNOSIS — I70.203 ATHEROSCLEROSIS OF ARTERY OF BOTH LOWER EXTREMITIES (H): ICD-10-CM

## 2019-11-05 DIAGNOSIS — E11.9 DIABETES MELLITUS TYPE 2, NONINSULIN DEPENDENT (H): ICD-10-CM

## 2019-11-05 DIAGNOSIS — Z72.0 TOBACCO ABUSE: ICD-10-CM

## 2019-11-05 DIAGNOSIS — Z71.6 TOBACCO ABUSE COUNSELING: ICD-10-CM

## 2019-11-05 DIAGNOSIS — L60.3 ONYCHODYSTROPHY: Primary | ICD-10-CM

## 2019-11-05 DIAGNOSIS — E11.42 DIABETIC POLYNEUROPATHY ASSOCIATED WITH TYPE 2 DIABETES MELLITUS (H): ICD-10-CM

## 2019-11-05 DIAGNOSIS — M21.969 LOSS OF PROTECTIVE SENSATION OF SKIN OF DEFORMED FOOT: ICD-10-CM

## 2019-11-05 DIAGNOSIS — L84 CALLUS OF FOOT: ICD-10-CM

## 2019-11-05 PROCEDURE — 11721 DEBRIDE NAIL 6 OR MORE: CPT | Mod: 59,Q8 | Performed by: PODIATRIST

## 2019-11-05 PROCEDURE — 11055 PARING/CUTG B9 HYPRKER LES 1: CPT | Performed by: PODIATRIST

## 2019-11-05 PROCEDURE — G0463 HOSPITAL OUTPT CLINIC VISIT: HCPCS

## 2019-11-05 PROCEDURE — 99213 OFFICE O/P EST LOW 20 MIN: CPT | Mod: 25 | Performed by: PODIATRIST

## 2019-11-05 PROCEDURE — G0463 HOSPITAL OUTPT CLINIC VISIT: HCPCS | Mod: 25

## 2019-11-05 ASSESSMENT — PAIN SCALES - GENERAL: PAINLEVEL: NO PAIN (0)

## 2019-11-05 NOTE — PATIENT INSTRUCTIONS

## 2019-11-05 NOTE — PROGRESS NOTES
"Chief complaint: Patient presents with:  Toenail: trimming      History of Present Illness: This 63 year old NIDDM female is seen for follow-up management of bilateral diabetic foot care. She still relates to \"big time\" tingling, burning and numbness in both feet. She is on the Gabapentin which at least decreases her pain. She saw the orthotist, Cheryl Mejia for DM shoes, but the orthotist only had tennis shoes available, and she only wants slip on Croc style shoes.     She is no longer using oil on her feet and it helps a lot with the night pain. She also has difficulty trimming her own toenails. She presents in an open heeled Croc shoe because it is comfortable. She has not worn DM shoes in the past and she finds tennis shoes uncomfortable.     She has smoked 1 ppd for about a long time. She says she was continuously called the last time the Quit Plan was referred, so she does not want a quit plan referral.  Last HbA1C was 8.1% on 09/12/2019. Previously it was 8.8% on 2/08/2019 and  9.0% on 05/20/2019. She says she is trying to avoid taking insulin and she is working on getting her blood sugars lowered. No further pedal complaints today.     /64 (BP Location: Left arm, Patient Position: Sitting, Cuff Size: Adult Regular)   Pulse 73   Temp 96.6  F (35.9  C) (Tympanic)   Resp 18   SpO2 95%     Patient Active Problem List   Diagnosis     Diabetes mellitus, type 2 (H)     Essential hypertension     Hearing loss     Hypercholesterolemia     Nerve pain       Past Surgical History:   Procedure Laterality Date     ARTHROPLASTY HIP Left 04/2015    fall--fracture     ARTHROSCOPY KNEE Left 01/2016    lateral meniscus removal and patellar and trochlear chondroplasty     ARTHROSCOPY SHOULDER ROTATOR CUFF REPAIR Right      CHOLECYSTECTOMY       COLONOSCOPY  10/08/2012    Colonoscopy f/u 2017     ELBOW SURGERY Left 08/2009    Dr Amaya     EYE SURGERY      Previous eye surgery x 2, one time she had a fish hook in her " eye     HYSTERECTOMY TOTAL ABDOMINAL, BILATERAL SALPINGO-OOPHORECTOMY, COMBINED  1989    benign reasons     LAPAROSCOPIC SUSPENSION BLADDER NECK      Dr Doan     LITHOTRIPSY  01/2011    Dr. Doan ([place]), three times       Current Outpatient Medications   Medication     albuterol (PROAIR HFA/PROVENTIL HFA/VENTOLIN HFA) 108 (90 BASE) MCG/ACT Inhaler     amLODIPine (NORVASC) 5 MG tablet     aspirin (ASA) 81 MG tablet     atenolol (TENORMIN) 100 MG tablet     blood glucose monitoring (NO BRAND SPECIFIED) meter device kit     Blood Glucose Monitoring Suppl (TRUE METRIX METER) w/Device KIT     Continuous Blood Gluc Sensor (DEXCOM G6 SENSOR) MISC     Continuous Blood Gluc Transmit (DEXCOM G6 TRANSMITTER) MISC     empagliflozin (JARDIANCE) 10 MG TABS tablet     fish oil-omega-3 fatty acids 1000 MG capsule     gabapentin (NEURONTIN) 600 MG tablet     glipiZIDE-metFORMIN (METAGLIP) 5-500 MG tablet     levothyroxine (SYNTHROID/LEVOTHROID) 150 MCG tablet     lisinopril-hydrochlorothiazide (PRINZIDE/ZESTORETIC) 20-25 MG tablet     Misc. Devices (ROLLER WALKER) MISC     order for DME     order for DME     pioglitazone (ACTOS) 15 MG tablet     pioglitazone (ACTOS) 30 MG tablet     rosuvastatin (CRESTOR) 20 MG tablet     triamcinolone (KENALOG) 0.5 % external ointment     No current facility-administered medications for this visit.           Allergies   Allergen Reactions     Oxycodone Itching and Nausea and Vomiting     Simvastatin GI Disturbance     Atorvastatin GI Disturbance     Bicillin C-R, Itching     Codeine Hives     Erythromycin Nausea and Vomiting     No Clinical Screening - See Comments Other (See Comments)     Oral contraceptives- didn't work     Pravastatin      Other reaction(s): Edema  Muscle aching and some swelling and edema snehal-orbitally.      Procaine Unknown     Delayed reaction- stays in system for long time     Sulfa Drugs      Other reaction(s): Edema       Family History   Problem Relation Age  of Onset     Cancer Father         Cancer, lung cancer.     Coronary Artery Disease Father      Heart Disease Mother         Heart Disease,CAD     Other - See Comments Mother         Stroke/Alzheimer's     Diabetes Mother         Diabetes,type II     Breast Cancer Mother         Cancer-breast,metastatic breast cancer     Diabetes Brother         Diabetes, blind     Heart Disease Brother         Heart Disease, heart disease     Diabetes Brother         Diabetes,type II     Heart Disease Brother         Heart Disease,heart disease     Substance Abuse Brother         Drug Abuse,chemical dependency w/ drugs     Diabetes Brother         Diabetes, blind     Other - See Comments Brother         Psychiatric illness,Bipolar/Stroke, CVA     Heart Disease Brother         Heart Disease, MI and cardiac stents,     Multiple Sclerosis Daughter      No Known Problems Half-Sister        Social History     Socioeconomic History     Marital status:      Spouse name: Not on file     Number of children: Not on file     Years of education: Not on file     Highest education level: Not on file   Occupational History     Not on file   Social Needs     Financial resource strain: Not on file     Food insecurity:     Worry: Not on file     Inability: Not on file     Transportation needs:     Medical: Not on file     Non-medical: Not on file   Tobacco Use     Smoking status: Current Every Day Smoker     Packs/day: 0.25     Years: 40.00     Pack years: 10.00     Types: Cigarettes     Last attempt to quit: 2014     Years since quittin.7     Smokeless tobacco: Never Used     Tobacco comment: Quit smoking: Trying to quit   Substance and Sexual Activity     Alcohol use: No     Drug use: No     Comment: Drug use: No     Sexual activity: Not Currently   Lifestyle     Physical activity:     Days per week: Not on file     Minutes per session: Not on file     Stress: Not on file   Relationships     Social connections:     Talks on phone:  Not on file     Gets together: Not on file     Attends [employer] service: Not on file     Active member of club or organization: Not on file     Attends meetings of clubs or organizations: Not on file     Relationship status: Not on file     Intimate partner violence:     Fear of current or ex partner: Not on file     Emotionally abused: Not on file     Physically abused: Not on file     Forced sexual activity: Not on file   Other Topics Concern     Not on file   Social History Narrative     August 8, 2008, [employer]. Retired. Three kids, Yessica in [place], Ashok in [place], Altaf in [place].       ROS: 10 point ROS neg other than the symptoms noted above in the HPI.  EXAM  Constitutional: healthy, alert and no distress    Psychiatric: mentation appears normal and affect normal/bright    VASCULAR:  -Dorsalis pedis pulse +1/4 b/l  -Posterior tibial pulse +1/4 b/l  -Capillary refill time < 3 seconds to b/l hallux  -Hair growth Absent to b/l anterior legs and ankles  -Mild 1+ pitting edema to bilateral ankles  NEURO:  -Light touch sensation intact to b/l plantar forefoot  -Protective sensation intact with SWM +10/10 RIGHT and +10/10 LEFT on 11/05/2019  -Protective sensation intact with SWM +7/10 RIGHT and +6/10 LEFT on 07/02/2019  DERM:  -Mild hyperkeratotic lesion to RIGHT sub 2nd metatarsal head and LEFT medial hallux IPJ -- no wound post paring  -Skin moderately warm to bilateral feet and legs with mild erythema diffusely to bilateral feet  -Skin atrophic, dry and flaking  -Toenails elongated, dystrophic and discolored x 10  MSK:  -Muscle strength of ankles +5/5 for dorsiflexion, plantarflexion, ABDUction and ADDuction b/l    ============================================================    ASSESSMENT:  (L60.3) Onychodystrophy  (primary encounter diagnosis)    (L84) Callus of foot    (I70.203) Atherosclerosis of artery of both lower extremities (H)    (E11.9) Diabetes mellitus type 2, noninsulin dependent  (H)    (E11.42) Diabetic polyneuropathy associated with type 2 diabetes mellitus (H)    (M21.969,  R20.8) Loss of protective sensation of skin of deformed foot    (Z72.0) Tobacco abuse    (Z71.6) Tobacco abuse counseling        PLAN:  -Patient evaluated and examined. Treatment options discussed with no educational barriers noted.  --Diabetic Foot Education provided. This included checking the feet daily looking for new new blisters or wounds, wearing shoes at all times when walking including around the house, and avoiding lotion application between the toes. Any sign of infection in the foot warrant's the patient presenting to the ED as soon as possible.   -Diabetic shoes ordered on 04/22/2019--patient did not like any of the shoe choices. She will look in [place] and [place] on her own time.She says her Crocs are comfortable    -Tobacco cessation discussed with patient including the benefits of quitting and the risks of not quitting. The Quit Plan referral was offered and patient stated she was called by them too many times in the past and she does not want this order placed again. Patient is not not interested in quitting today.  -Nails debrided x 10 without incident  -Callus pared x 1 to LEFT medial hallux IPJ without incident. RIGHT plantar 2nd metatarsal head was not in need of paring today.  ---Patient reminded that the callus will likely return due to the underlying, prominent bone causing the callus while the patient is walking.  -F/u foot exam for DM patient with sensation  -Patient in agreement with the above treatment plan and all of patient's questions were answered.      RTC six months for high risk nail debridement per patient request  ---Patient does not want to drive in the winter. She has agreed to carefully check her feet and she will follow-up earlier if she has any concerns with her feet. She will resume three month follow-up care after her next appointment.        Eileen Morales DPM

## 2019-11-05 NOTE — NURSING NOTE
"Chief Complaint   Patient presents with     Toenail     trimming       Initial /64 (BP Location: Left arm, Patient Position: Sitting, Cuff Size: Adult Regular)   Pulse 73   Temp 96.6  F (35.9  C) (Tympanic)   Resp 18   SpO2 95%  Estimated body mass index is 30.45 kg/m  as calculated from the following:    Height as of 10/7/19: 1.651 m (5' 5\").    Weight as of 10/7/19: 83 kg (183 lb).  Medication Reconciliation: martha Yates  "

## 2019-11-07 ENCOUNTER — TELEPHONE (OUTPATIENT)
Dept: EDUCATION SERVICES | Facility: OTHER | Age: 64
End: 2019-11-07

## 2019-11-07 DIAGNOSIS — E11.65 UNCONTROLLED TYPE 2 DIABETES MELLITUS WITH HYPERGLYCEMIA (H): ICD-10-CM

## 2019-11-07 RX ORDER — PROCHLORPERAZINE 25 MG/1
1 SUPPOSITORY RECTAL CONTINUOUS PRN
Qty: 1 DEVICE | Refills: 0 | Status: SHIPPED | OUTPATIENT
Start: 2019-11-07

## 2019-11-08 NOTE — TELEPHONE ENCOUNTER
Fax received stating new order needed for Dexcom G6 CGM system  with more specific verbiage for Medicare coverage of CGM.     order updated and resent to [facility].    Any diabetes medication dose changes were made via the CDE Protocol and Collaborative Practice Agreement with the patient's primary care provider.     Erin Hair RN, BSN, CDE  11/7/2019 6:59 PM

## 2019-12-13 DIAGNOSIS — I10 BENIGN ESSENTIAL HYPERTENSION: ICD-10-CM

## 2019-12-13 DIAGNOSIS — E11.65 UNCONTROLLED TYPE 2 DIABETES MELLITUS WITH HYPERGLYCEMIA (H): ICD-10-CM

## 2019-12-13 LAB
ANION GAP SERPL CALCULATED.3IONS-SCNC: 10 MMOL/L (ref 3–14)
BUN SERPL-MCNC: 19 MG/DL (ref 7–25)
CALCIUM SERPL-MCNC: 10.4 MG/DL (ref 8.6–10.3)
CHLORIDE SERPL-SCNC: 102 MMOL/L (ref 98–107)
CO2 SERPL-SCNC: 29 MMOL/L (ref 21–31)
CREAT SERPL-MCNC: 0.87 MG/DL (ref 0.6–1.2)
GFR SERPL CREATININE-BSD FRML MDRD: 65 ML/MIN/{1.73_M2}
GLUCOSE SERPL-MCNC: 195 MG/DL (ref 70–105)
HBA1C MFR BLD: 8.5 % (ref 4–6)
POTASSIUM SERPL-SCNC: 4.3 MMOL/L (ref 3.5–5.1)
SODIUM SERPL-SCNC: 141 MMOL/L (ref 134–144)

## 2019-12-13 PROCEDURE — 36415 COLL VENOUS BLD VENIPUNCTURE: CPT | Mod: ZL | Performed by: INTERNAL MEDICINE

## 2019-12-13 PROCEDURE — 83036 HEMOGLOBIN GLYCOSYLATED A1C: CPT | Mod: ZL | Performed by: INTERNAL MEDICINE

## 2019-12-13 PROCEDURE — 80048 BASIC METABOLIC PNL TOTAL CA: CPT | Mod: ZL | Performed by: INTERNAL MEDICINE

## 2019-12-16 ENCOUNTER — OFFICE VISIT (OUTPATIENT)
Dept: INTERNAL MEDICINE | Facility: OTHER | Age: 64
End: 2019-12-16
Attending: INTERNAL MEDICINE
Payer: COMMERCIAL

## 2019-12-16 VITALS
SYSTOLIC BLOOD PRESSURE: 136 MMHG | HEART RATE: 76 BPM | WEIGHT: 180.1 LBS | OXYGEN SATURATION: 95 % | DIASTOLIC BLOOD PRESSURE: 72 MMHG | TEMPERATURE: 98.1 F | HEIGHT: 65 IN | RESPIRATION RATE: 18 BRPM | BODY MASS INDEX: 30.01 KG/M2

## 2019-12-16 DIAGNOSIS — Z72.0 TOBACCO USE: ICD-10-CM

## 2019-12-16 DIAGNOSIS — E11.65 UNCONTROLLED TYPE 2 DIABETES MELLITUS WITH HYPERGLYCEMIA (H): Primary | ICD-10-CM

## 2019-12-16 PROCEDURE — 99214 OFFICE O/P EST MOD 30 MIN: CPT | Performed by: INTERNAL MEDICINE

## 2019-12-16 PROCEDURE — G0463 HOSPITAL OUTPT CLINIC VISIT: HCPCS

## 2019-12-16 RX ORDER — PIOGLITAZONEHYDROCHLORIDE 15 MG/1
TABLET ORAL
Qty: 90 TABLET | Refills: 0 | Status: SHIPPED | OUTPATIENT
Start: 2019-12-16 | End: 2020-04-01

## 2019-12-16 RX ORDER — PIOGLITAZONEHYDROCHLORIDE 30 MG/1
TABLET ORAL
Qty: 90 TABLET | Refills: 0 | Status: SHIPPED | OUTPATIENT
Start: 2019-12-16 | End: 2020-04-01

## 2019-12-16 RX ORDER — GLIPIZIDE 10 MG/1
TABLET ORAL
Refills: 2 | COMMUNITY
Start: 2019-10-02 | End: 2019-12-16

## 2019-12-16 ASSESSMENT — MIFFLIN-ST. JEOR: SCORE: 1357.81

## 2019-12-16 ASSESSMENT — PAIN SCALES - GENERAL: PAINLEVEL: NO PAIN (0)

## 2019-12-16 NOTE — PROGRESS NOTES
"Nursing Notes:   Kelly Cat LPN  12/16/2019 12:01 PM  Signed  Patient presents to the clinic for diabetic follow-up.     Previous A1C is not at goal of <8    Lab Results   Component Value Date    A1C 8.5 12/13/2019       Patient has Type 2 Diabetes  Diabetes medications: Oral Medications: Metaglip - Dose: 5-500, Time: morning and evening  Patient is not on a daily aspirin  Patient is on a Statin.  Blood glucose tests per day \"Currently not testing at this time\"  Urine microalbumin:creatine: 10/17/2018  Foot exam 04/01/2019  Eye exam 05/07/2019    Blood pressure today of 136/72 is at the goal of <139/89 for diabetics.    Tobacco User:   History   Smoking Status     Current Every Day Smoker     Packs/day: 0.25     Years: 40.00     Types: Cigarettes     Last attempt to quit: 8/1/2014   Smokeless Tobacco     Never Used     Comment: Quit smoking: Trying to quit       Other concerns today: None noted.     Medication Reconciliation: complete     Kelly Cat LPN on 12/16/2019 at 11:45 AM    Chief Complaint   Patient presents with     Diabetes         HPI: Ms. Bennett is a 64 year old female who presents today for follow up of diabetes mellitus.    Nursing note reviewed with patient.  Accurracy and completeness verified.      Patients diabetes is uncontrolled.  She currently denies significant lows.  She has not been checking blood sugar.  She denies any obvious side effects from diabetic medications.  She is on glipizide, metformin and Actos as these medications are the most affordable for her.  She has been eating more cookies lately.    She continues to smoke.  She has decreased her smoking and is using 4 packs monthly.    She declines a flu shot today.    Past medical history reviewed as below:     Past Medical History:   Diagnosis Date     Atopic dermatitis 08/20/2013     Autoimmune thyroiditis     [place]imotos     Benign paroxysmal vertigo 03/21/2013     Calculus of kidney 01/22/2011     Closed " "fracture of neck of femur (H) 05/07/2015    after a fall     Disorder of cartilage      Essential hypertension 10/20/2015     Hearing loss      Lateral epicondylitis 7/25/2006    Overview:  IMO Update 10/11     Other intervertebral disc degeneration, lumbosacral region     No Comments Provided     Other specified symptoms and signs involving the circulatory and respiratory systems 08/02/2012     Peripheral vascular disease (H) 08/02/2012    with claudication     Polyp of colon     with low grade colon polyp at the hepatic flexure and at 22 cm.; sessile serrated adenoma with low grade dysplasia colon polyp at the splenic flexure.     Pregnancy     G4, P3, A questionable 1, and 1 stillborn of a twin     Pure hypercholesterolemia      Shoulder impingement, right 09/15/2010     Tobacco use      Type 2 diabetes mellitus without complications (H)    .      ROS  Pertinent ROS was performed and was negative, including for fever, chills, chest pain, shortness of breath, increased lower extremity edema, changes in bowel or bladder, blood in the stool, difficulty swallowing, sores in the mouth. No other concerns, with exception of HPI above.      EXAM:   /72 (BP Location: Right arm, Patient Position: Sitting, Cuff Size: Adult Regular)   Pulse 76   Temp 98.1  F (36.7  C) (Tympanic)   Resp 18   Ht 1.651 m (5' 5\")   Wt 81.7 kg (180 lb 1.6 oz)   SpO2 95%   Breastfeeding No   BMI 29.97 kg/m      Estimated body mass index is 29.97 kg/m  as calculated from the following:    Height as of this encounter: 1.651 m (5' 5\").    Weight as of this encounter: 81.7 kg (180 lb 1.6 oz).      GEN: Vitals reviewed. Healthy appearing. Patient is in no acute distress. Cooperative with exam.  HEENT: Normocephalic atraumatic.  Pupils equally round.  No scleral icterus, no conjunctival erythema. Oropharynx with no erythema or exudates. Dentition adequate.  CV: Heart regular in rate and rhythm with no murmur.    LUNGS: Lungs clear to " auscultation bilaterally.  Chest rise equal bilaterally.  No accessory muscle use.  ABD:  Nondistended  SKIN: Warm and dry to touch.  No rash on face, arms and legs.  EXT: No clubbing or cyanosis. Trace lower ext peripheral edema.  PSYCH: Mood is good.  Affect appropriate. Speech fluent. Answers questions appropriately and thought process normal.     ASSESSMENT AND PLAN:  1. Uncontrolled type 2 diabetes mellitus with hyperglycemia (H)  -Blood sugars are increased at this time.  Encouraged to work on her diet.  We will plan to recheck in 3 months.  - pioglitazone (ACTOS) 30 MG tablet; TAKE 1 TABLET BY MOUTH EVERY MORNING IN addtion TO 15 MG TABLET in the evening  Dispense: 90 tablet; Refill: 0  - pioglitazone (ACTOS) 15 MG tablet; TAKE 1 TABLET BY MOUTH EVERY EVENING IN addition TO 30 MG IN THE MORNING.  Dispense: 90 tablet; Refill: 0    2. Tobacco use  - cessation was encouraged in order to improve pain, quality of life and long term outcomes  - Brochures/handouts provided       Return in about 3 months (around 3/16/2020) for Recheck diabetes.        ADITHYA VALERA DO   12/16/2019 12:03 PM    This document was prepared using voice generated softwear. While every attempt was made for accuracy, grammatical errors may exist.

## 2019-12-16 NOTE — NURSING NOTE
"Patient presents to the clinic for diabetic follow-up.     Previous A1C is not at goal of <8    Lab Results   Component Value Date    A1C 8.5 12/13/2019       Patient has Type 2 Diabetes  Diabetes medications: Oral Medications: Metaglip - Dose: 5-500, Time: morning and evening  Patient is not on a daily aspirin  Patient is on a Statin.  Blood glucose tests per day \"Currently not testing at this time\"  Urine microalbumin:creatine: 10/17/2018  Foot exam 04/01/2019  Eye exam 05/07/2019    Blood pressure today of 136/72 is at the goal of <139/89 for diabetics.    Tobacco User:   History   Smoking Status     Current Every Day Smoker     Packs/day: 0.25     Years: 40.00     Types: Cigarettes     Last attempt to quit: 8/1/2014   Smokeless Tobacco     Never Used     Comment: Quit smoking: Trying to quit       Other concerns today: None noted.     Medication Reconciliation: complete     Kelly Cat LPN on 12/16/2019 at 11:45 AM  "

## 2020-01-09 DIAGNOSIS — E11.42 DIABETIC POLYNEUROPATHY ASSOCIATED WITH TYPE 2 DIABETES MELLITUS (H): ICD-10-CM

## 2020-01-09 RX ORDER — GABAPENTIN 600 MG/1
TABLET ORAL
Qty: 180 TABLET | Refills: 9 | Status: SHIPPED | OUTPATIENT
Start: 2020-01-09 | End: 2020-08-10

## 2020-02-28 ENCOUNTER — HOSPITAL ENCOUNTER (OUTPATIENT)
Dept: GENERAL RADIOLOGY | Facility: OTHER | Age: 65
End: 2020-02-28
Attending: NURSE PRACTITIONER
Payer: MEDICARE

## 2020-02-28 ENCOUNTER — OFFICE VISIT (OUTPATIENT)
Dept: INTERNAL MEDICINE | Facility: OTHER | Age: 65
End: 2020-02-28
Attending: NURSE PRACTITIONER
Payer: MEDICARE

## 2020-02-28 VITALS
HEIGHT: 65 IN | BODY MASS INDEX: 30.66 KG/M2 | OXYGEN SATURATION: 97 % | HEART RATE: 71 BPM | WEIGHT: 184 LBS | DIASTOLIC BLOOD PRESSURE: 60 MMHG | RESPIRATION RATE: 16 BRPM | SYSTOLIC BLOOD PRESSURE: 110 MMHG | TEMPERATURE: 96.9 F

## 2020-02-28 DIAGNOSIS — J06.9 UPPER RESPIRATORY TRACT INFECTION, UNSPECIFIED TYPE: ICD-10-CM

## 2020-02-28 DIAGNOSIS — R05.3 PERSISTENT COUGH: ICD-10-CM

## 2020-02-28 DIAGNOSIS — R05.3 PERSISTENT COUGH: Primary | ICD-10-CM

## 2020-02-28 LAB
ANION GAP SERPL CALCULATED.3IONS-SCNC: 8 MMOL/L (ref 3–14)
BASOPHILS # BLD AUTO: 0.1 10E9/L (ref 0–0.2)
BASOPHILS NFR BLD AUTO: 1 %
BUN SERPL-MCNC: 12 MG/DL (ref 7–25)
CALCIUM SERPL-MCNC: 10 MG/DL (ref 8.6–10.3)
CHLORIDE SERPL-SCNC: 100 MMOL/L (ref 98–107)
CO2 SERPL-SCNC: 29 MMOL/L (ref 21–31)
CREAT SERPL-MCNC: 0.79 MG/DL (ref 0.6–1.2)
DIFFERENTIAL METHOD BLD: ABNORMAL
EOSINOPHIL # BLD AUTO: 0.2 10E9/L (ref 0–0.7)
EOSINOPHIL NFR BLD AUTO: 2.2 %
ERYTHROCYTE [DISTWIDTH] IN BLOOD BY AUTOMATED COUNT: 15.3 % (ref 10–15)
GFR SERPL CREATININE-BSD FRML MDRD: 73 ML/MIN/{1.73_M2}
GLUCOSE SERPL-MCNC: 161 MG/DL (ref 70–105)
HCT VFR BLD AUTO: 42.4 % (ref 35–47)
HGB BLD-MCNC: 13.6 G/DL (ref 11.7–15.7)
IMM GRANULOCYTES # BLD: 0.1 10E9/L (ref 0–0.4)
IMM GRANULOCYTES NFR BLD: 0.8 %
LYMPHOCYTES # BLD AUTO: 3.1 10E9/L (ref 0.8–5.3)
LYMPHOCYTES NFR BLD AUTO: 31.9 %
MCH RBC QN AUTO: 29.2 PG (ref 26.5–33)
MCHC RBC AUTO-ENTMCNC: 32.1 G/DL (ref 31.5–36.5)
MCV RBC AUTO: 91 FL (ref 78–100)
MONOCYTES # BLD AUTO: 0.8 10E9/L (ref 0–1.3)
MONOCYTES NFR BLD AUTO: 8.6 %
NEUTROPHILS # BLD AUTO: 5.4 10E9/L (ref 1.6–8.3)
NEUTROPHILS NFR BLD AUTO: 55.5 %
PLATELET # BLD AUTO: 322 10E9/L (ref 150–450)
POTASSIUM SERPL-SCNC: 4 MMOL/L (ref 3.5–5.1)
RBC # BLD AUTO: 4.66 10E12/L (ref 3.8–5.2)
SODIUM SERPL-SCNC: 137 MMOL/L (ref 134–144)
WBC # BLD AUTO: 9.7 10E9/L (ref 4–11)

## 2020-02-28 PROCEDURE — 71046 X-RAY EXAM CHEST 2 VIEWS: CPT

## 2020-02-28 PROCEDURE — G0463 HOSPITAL OUTPT CLINIC VISIT: HCPCS | Mod: 25

## 2020-02-28 PROCEDURE — 85025 COMPLETE CBC W/AUTO DIFF WBC: CPT | Mod: ZL | Performed by: NURSE PRACTITIONER

## 2020-02-28 PROCEDURE — 99213 OFFICE O/P EST LOW 20 MIN: CPT | Performed by: NURSE PRACTITIONER

## 2020-02-28 PROCEDURE — 36415 COLL VENOUS BLD VENIPUNCTURE: CPT | Mod: ZL | Performed by: NURSE PRACTITIONER

## 2020-02-28 PROCEDURE — 80048 BASIC METABOLIC PNL TOTAL CA: CPT | Mod: ZL | Performed by: NURSE PRACTITIONER

## 2020-02-28 ASSESSMENT — ENCOUNTER SYMPTOMS
SHORTNESS OF BREATH: 1
COUGH: 1
SORE THROAT: 0
DIARRHEA: 1
FATIGUE: 1
VOMITING: 0
CHILLS: 1
NAUSEA: 0
HEADACHES: 1

## 2020-02-28 ASSESSMENT — MIFFLIN-ST. JEOR: SCORE: 1375.5

## 2020-02-28 ASSESSMENT — PAIN SCALES - GENERAL: PAINLEVEL: SEVERE PAIN (6)

## 2020-02-28 NOTE — NURSING NOTE
Chief Complaint   Patient presents with     Cough   Patient presents to the clinic today for a cough, phlegm, fatigue, and fever.    Medication Reconciliation: completed   Genoveva Vital LPN  2/28/2020 10:26 AM

## 2020-02-28 NOTE — PROGRESS NOTES
Nursing Notes:   Genoveva Vital LPN  2/28/2020 10:47 AM  Signed  Chief Complaint   Patient presents with     Cough   Patient presents to the clinic today for a cough, phlegm, fatigue, and fever.    Medication Reconciliation: completed   Genoveva Viatl LPN  2/28/2020 10:26 AM   Nursing note reviewed with patient.  Accuracy and completeness verified.      SUBJECTIVE:  Narda Bennett is a 64 year old female patient who presents to the clinic today with complaints of URI symptoms for ten days. Other symptoms include coughing, thick grayish phlegm, fatigued, chills, headaches, denies sore throat, N/V.  The patient has used the following OTC cough syrup, benadryl. She reports she feels good, just tired, but that her  made her come in as he said she was wheezing and sleeping a lot.    She reports she is down to 1/2 cigarette per day and that doesn't even taste good anymore, she says.    The patient has a past medical history which is reviewed and listed as below:    Past Medical History:   Diagnosis Date     Atopic dermatitis 08/20/2013     Autoimmune thyroiditis     [facility]     Benign paroxysmal vertigo 03/21/2013     Calculus of kidney 01/22/2011     Closed fracture of neck of femur (H) 05/07/2015    after a fall     Disorder of cartilage      Essential hypertension 10/20/2015     Hearing loss      Lateral epicondylitis 7/25/2006    Overview:  IMO Update 10/11     Other intervertebral disc degeneration, lumbosacral region     No Comments Provided     Other specified symptoms and signs involving the circulatory and respiratory systems 08/02/2012     Peripheral vascular disease (H) 08/02/2012    with claudication     Polyp of colon     with low grade colon polyp at the hepatic flexure and at 22 cm.; sessile serrated adenoma with low grade dysplasia colon polyp at the splenic flexure.     Pregnancy     G4, P3, A questionable 1, and 1 stillborn of a twin     Pure hypercholesterolemia      Shoulder impingement,  right 09/15/2010     Tobacco use      Type 2 diabetes mellitus without complications (H)       Family History   Problem Relation Age of Onset     Cancer Father         Cancer, lung cancer.     Coronary Artery Disease Father      Heart Disease Mother         Heart Disease,CAD     Other - See Comments Mother         Stroke/Alzheimer's     Diabetes Mother         Diabetes,type II     Breast Cancer Mother         Cancer-breast,metastatic breast cancer     Diabetes Brother         Diabetes, blind     Heart Disease Brother         Heart Disease, heart disease     Diabetes Brother         Diabetes,type II     Heart Disease Brother         Heart Disease,heart disease     Substance Abuse Brother         Drug Abuse,chemical dependency w/ drugs     Diabetes Brother         Diabetes, blind     Other - See Comments Brother         Psychiatric illness,Bipolar/Stroke, CVA     Heart Disease Brother         Heart Disease, MI and cardiac stents,     Multiple Sclerosis Daughter      No Known Problems Half-Sister       Social History     Socioeconomic History     Marital status:      Spouse name: Not on file     Number of children: Not on file     Years of education: Not on file     Highest education level: Not on file   Occupational History     Not on file   Social Needs     Financial resource strain: Not on file     Food insecurity:     Worry: Not on file     Inability: Not on file     Transportation needs:     Medical: Not on file     Non-medical: Not on file   Tobacco Use     Smoking status: Former Smoker     Packs/day: 0.00     Years: 40.00     Pack years: 0.00     Types: Cigarettes     Last attempt to quit: 2/26/2020     Smokeless tobacco: Never Used     Tobacco comment: Quit smoking:    Substance and Sexual Activity     Alcohol use: No     Drug use: No     Comment: Drug use: No     Sexual activity: Not Currently     Partners: Male   Lifestyle     Physical activity:     Days per week: Not on file     Minutes per session:  "Not on file     Stress: Not on file   Relationships     Social connections:     Talks on phone: Not on file     Gets together: Not on file     Attends Ten[employer] service: Not on file     Active member of club or organization: Not on file     Attends meetings of clubs or organizations: Not on file     Relationship status: Not on file     Intimate partner violence:     Fear of current or ex partner: Not on file     Emotionally abused: Not on file     Physically abused: Not on file     Forced sexual activity: Not on file   Other Topics Concern     Parent/sibling w/ CABG, MI or angioplasty before 65F 55M? Not Asked   Social History Narrative     August 8, 2008, [place]. Retired. Three kids, Yessica in [place], Ashok in [place], Altaf in [place].     SUBJECTIVE:    REVIEW OF SYSTEMS:    Review of Systems   Constitutional: Positive for chills and fatigue.   HENT: Positive for congestion and postnasal drip. Negative for sore throat.    Respiratory: Positive for cough and shortness of breath.    Gastrointestinal: Positive for diarrhea. Negative for nausea and vomiting.   Neurological: Positive for headaches.   All other systems reviewed and are negative.    OBJECTIVE:    EXAM:     /60 (BP Location: Right arm, Patient Position: Sitting, Cuff Size: Adult Large)   Pulse 71   Temp 96.9  F (36.1  C) (Tympanic)   Resp 16   Ht 1.651 m (5' 5\")   Wt 83.5 kg (184 lb)   SpO2 97%   BMI 30.62 kg/m      Estimated body mass index is 30.62 kg/m  as calculated from the following:    Height as of this encounter: 1.651 m (5' 5\").    Weight as of this encounter: 83.5 kg (184 lb).     Physical Exam  Constitutional:       Appearance: Normal appearance. She is obese.   HENT:      Head: Normocephalic and atraumatic.      Nose: Nose normal.      Mouth/Throat:      Mouth: Mucous membranes are moist.      Comments: No teeth  Eyes:      Extraocular Movements: Extraocular movements intact.      Conjunctiva/sclera: Conjunctivae " normal.      Pupils: Pupils are equal, round, and reactive to light.   Cardiovascular:      Rate and Rhythm: Normal rate and regular rhythm.      Pulses: Normal pulses.      Heart sounds: Normal heart sounds.   Pulmonary:      Effort: Pulmonary effort is normal.      Breath sounds: Examination of the right-upper field reveals wheezing. Examination of the left-upper field reveals wheezing. Examination of the right-middle field reveals wheezing. Examination of the left-middle field reveals wheezing. Examination of the right-lower field reveals decreased breath sounds and wheezing. Examination of the left-lower field reveals decreased breath sounds and wheezing. Decreased breath sounds and wheezing present.   Abdominal:      General: Bowel sounds are normal.   Musculoskeletal: Normal range of motion.      Comments: Walks with walking stick   Skin:     General: Skin is warm and dry.   Neurological:      Mental Status: She is alert and oriented to person, place, and time.   Psychiatric:         Mood and Affect: Mood normal.         Behavior: Behavior normal.         Thought Content: Thought content normal.         Judgment: Judgment normal.       Results for orders placed or performed during the hospital encounter of 02/28/20   XR Chest 2 Views     Status: None    Narrative    PROCEDURE: XR CHEST 2 VW 2/28/2020 11:16 AM    HISTORY: Persistent cough; Upper respiratory tract infection,  unspecified type    COMPARISONS: None.    TECHNIQUE: 2 views.    FINDINGS: Heart and pulmonary vasculature are normal. Lungs are clear  and no pleural effusion is seen.    Moderate degenerative changes seen in the spine. Surgical clips are  seen in the right upper quadrant.         Impression    IMPRESSION: No confluent infiltrate.    SOMMER PAYNE MD   Results for orders placed or performed in visit on 02/28/20   Basic metabolic panel     Status: Abnormal   Result Value Ref Range    Sodium 137 134 - 144 mmol/L    Potassium 4.0 3.5 -  5.1 mmol/L    Chloride 100 98 - 107 mmol/L    Carbon Dioxide 29 21 - 31 mmol/L    Anion Gap 8 3 - 14 mmol/L    Glucose 161 (H) 70 - 105 mg/dL    Urea Nitrogen 12 7 - 25 mg/dL    Creatinine 0.79 0.60 - 1.20 mg/dL    GFR Estimate 73 >60 mL/min/[1.73_m2]    GFR Estimate If Black 88 >60 mL/min/[1.73_m2]    Calcium 10.0 8.6 - 10.3 mg/dL   CBC with platelets differential     Status: Abnormal   Result Value Ref Range    WBC 9.7 4.0 - 11.0 10e9/L    RBC Count 4.66 3.8 - 5.2 10e12/L    Hemoglobin 13.6 11.7 - 15.7 g/dL    Hematocrit 42.4 35.0 - 47.0 %    MCV 91 78 - 100 fl    MCH 29.2 26.5 - 33.0 pg    MCHC 32.1 31.5 - 36.5 g/dL    RDW 15.3 (H) 10.0 - 15.0 %    Platelet Count 322 150 - 450 10e9/L    Diff Method Automated Method     % Neutrophils 55.5 %    % Lymphocytes 31.9 %    % Monocytes 8.6 %    % Eosinophils 2.2 %    % Basophils 1.0 %    % Immature Granulocytes 0.8 %    Absolute Neutrophil 5.4 1.6 - 8.3 10e9/L    Absolute Lymphocytes 3.1 0.8 - 5.3 10e9/L    Absolute Monocytes 0.8 0.0 - 1.3 10e9/L    Absolute Eosinophils 0.2 0.0 - 0.7 10e9/L    Absolute Basophils 0.1 0.0 - 0.2 10e9/L    Abs Immature Granulocytes 0.1 0 - 0.4 10e9/L     ASSESSMENT/PLAN:    1. Persistent cough  - XR Chest 2 Views; Future  - Basic metabolic panel  - CBC with platelets differential; Future  - Continue to use OTC products  - Increase water intake to thin secretions  - Do lung expansion breathing exercises   - Stay active    2. Upper respiratory tract symptom, unspecified type  - XR Chest 2 Views; Future  - Basic metabolic panel  - CBC with platelets differential; Future  - Most likely viral, or due to the fact she has basically stopped smoking now    Seek care immediately if:    You have chest pain or trouble breathing.    Contact your healthcare provider if:    You have a fever over 102 F (39 C).     Your sore throat gets worse or you see white or yellow spots in your throat.     Your symptoms get worse after 3 to 5 days or your cold is not  better in 14 days.     You have a rash anywhere on your skin.     You have large, tender lumps in your neck.     You have thick, green, or yellow drainage from your nose.     You cough up thick yellow, green, or bloody mucus.     You are vomiting for more than 24 hours and cannot keep fluids down.     You have a bad earache.     You have questions or concerns about your condition or care.    Medicines:  Recommend OTC products for symptomatic relief, increased fluid intake, humidified air and rest as much as possible.  You may need any of the following:    Decongestants help reduce nasal congestion and help you breathe more easily. If you take decongestant pills, they may make you feel restless or cause problems with your sleep. Do not use decongestant sprays for more than a few days.     Cough suppressants help reduce coughing.     NSAIDs, such as ibuprofen, help decrease swelling, pain, and fever. NSAIDs can cause stomach bleeding or kidney problems in certain people. Do not use if you take blood thinner medicine. Always read the medicine label and follow directions.     Acetaminophen decreases pain and fever. It is available without a doctor's order. Follow directions. Read the labels of all other medicines you are using to see if they also contain acetaminophen, or ask your doctor or pharmacist. Acetaminophen can cause liver damage if not taken correctly. Do not use more than 4 grams (4,000 milligrams) total of acetaminophen in one day.     Take your medicine as directed. Contact your healthcare provider if you think your medicine is not helping or if you have side effects. Tell him or her if you are allergic to any medicine. Keep a list of the medicines, vitamins, and herbs you take. Include the amounts, and when and why you take them. Bring the list or the pill bottles to follow-up visits. Carry your medicine list with you in case of an emergency.     Patient verbalizes understanding and is agreeable to plan of  care.    Return for Recheck, Diabetic Follow Up on 3/16/2020 with Dr. Gordon as scheduled .    MARYANN Navarro, AGNP-C  Internal Medicine  [facility]    Feb 28, 2020 11:56 AM

## 2020-02-28 NOTE — RESULT ENCOUNTER NOTE
Results discussed directly with patient while patient was present. Any further details documented in the note.   Deann Huerta NP

## 2020-04-27 DIAGNOSIS — E11.65 UNCONTROLLED TYPE 2 DIABETES MELLITUS WITH HYPERGLYCEMIA (H): ICD-10-CM

## 2020-04-27 DIAGNOSIS — E03.9 HYPOTHYROIDISM, UNSPECIFIED TYPE: ICD-10-CM

## 2020-04-27 DIAGNOSIS — I10 BENIGN ESSENTIAL HYPERTENSION: ICD-10-CM

## 2020-04-27 RX ORDER — GLIPIZIDE AND METFORMIN HCL 5; 500 MG/1; MG/1
2 TABLET, FILM COATED ORAL
Qty: 360 TABLET | Refills: 1 | Status: SHIPPED | OUTPATIENT
Start: 2020-04-27 | End: 2020-08-10

## 2020-04-27 RX ORDER — LISINOPRIL AND HYDROCHLOROTHIAZIDE 20; 25 MG/1; MG/1
1 TABLET ORAL DAILY
Qty: 90 TABLET | Refills: 0 | Status: SHIPPED | OUTPATIENT
Start: 2020-04-27 | End: 2020-07-15

## 2020-04-27 RX ORDER — LEVOTHYROXINE SODIUM 150 UG/1
150 TABLET ORAL DAILY
Qty: 90 TABLET | Refills: 0 | Status: SHIPPED | OUTPATIENT
Start: 2020-04-27 | End: 2020-07-15

## 2020-04-27 NOTE — TELEPHONE ENCOUNTER
Routing refill request to provider for review/approval because:  Labs not current:  A1C    Glipizide-Metformin 5-500mg tablet  1/3/20  360# 0 Refills    Future appt 7/10/2020  Last office visit 12/16/2019  Anna Chan RN on 4/27/2020 at 4:45 PM                                  Prescription approved per [facility] Refill Protocol.    Last refill  Lisinopril-Hydrochlorothizide 25mg tablet  10/7/19  90# 1 Refill    Levothyroxine 150mcg  10/7/2019  90# 0 Refills    LOV:12/16/19  Future 7/10/2020  Anna Chan RN on 4/27/2020 at 4:43 PM

## 2020-08-10 ENCOUNTER — OFFICE VISIT (OUTPATIENT)
Dept: INTERNAL MEDICINE | Facility: OTHER | Age: 65
End: 2020-08-10
Attending: INTERNAL MEDICINE
Payer: COMMERCIAL

## 2020-08-10 VITALS
HEART RATE: 68 BPM | WEIGHT: 184.8 LBS | OXYGEN SATURATION: 97 % | SYSTOLIC BLOOD PRESSURE: 138 MMHG | DIASTOLIC BLOOD PRESSURE: 54 MMHG | BODY MASS INDEX: 30.75 KG/M2 | RESPIRATION RATE: 18 BRPM | TEMPERATURE: 97.6 F

## 2020-08-10 DIAGNOSIS — E03.9 HYPOTHYROIDISM, UNSPECIFIED TYPE: ICD-10-CM

## 2020-08-10 DIAGNOSIS — E11.65 UNCONTROLLED TYPE 2 DIABETES MELLITUS WITH HYPERGLYCEMIA (H): ICD-10-CM

## 2020-08-10 DIAGNOSIS — I10 BENIGN ESSENTIAL HYPERTENSION: ICD-10-CM

## 2020-08-10 DIAGNOSIS — L60.8 TOENAIL DEFORMITY: ICD-10-CM

## 2020-08-10 DIAGNOSIS — E11.42 DIABETIC POLYNEUROPATHY ASSOCIATED WITH TYPE 2 DIABETES MELLITUS (H): ICD-10-CM

## 2020-08-10 DIAGNOSIS — L84 CALLUS OF FOOT: ICD-10-CM

## 2020-08-10 LAB
ANION GAP SERPL CALCULATED.3IONS-SCNC: 7 MMOL/L (ref 3–14)
BUN SERPL-MCNC: 12 MG/DL (ref 7–25)
CALCIUM SERPL-MCNC: 10 MG/DL (ref 8.6–10.3)
CHLORIDE SERPL-SCNC: 103 MMOL/L (ref 98–107)
CHOLEST SERPL-MCNC: 136 MG/DL
CO2 SERPL-SCNC: 27 MMOL/L (ref 21–31)
CREAT SERPL-MCNC: 0.74 MG/DL (ref 0.6–1.2)
GFR SERPL CREATININE-BSD FRML MDRD: 78 ML/MIN/{1.73_M2}
GLUCOSE SERPL-MCNC: 122 MG/DL (ref 70–105)
HBA1C MFR BLD: 7.4 % (ref 4–6)
HDLC SERPL-MCNC: 42 MG/DL (ref 23–92)
LDLC SERPL CALC-MCNC: 38 MG/DL
NONHDLC SERPL-MCNC: 94 MG/DL
POTASSIUM SERPL-SCNC: 4 MMOL/L (ref 3.5–5.1)
SODIUM SERPL-SCNC: 137 MMOL/L (ref 134–144)
TRIGL SERPL-MCNC: 279 MG/DL

## 2020-08-10 PROCEDURE — 80048 BASIC METABOLIC PNL TOTAL CA: CPT | Mod: ZL | Performed by: INTERNAL MEDICINE

## 2020-08-10 PROCEDURE — 82043 UR ALBUMIN QUANTITATIVE: CPT | Mod: ZL | Performed by: INTERNAL MEDICINE

## 2020-08-10 PROCEDURE — 80061 LIPID PANEL: CPT | Mod: ZL | Performed by: INTERNAL MEDICINE

## 2020-08-10 PROCEDURE — 99214 OFFICE O/P EST MOD 30 MIN: CPT | Performed by: INTERNAL MEDICINE

## 2020-08-10 PROCEDURE — G0463 HOSPITAL OUTPT CLINIC VISIT: HCPCS

## 2020-08-10 PROCEDURE — 83036 HEMOGLOBIN GLYCOSYLATED A1C: CPT | Mod: ZL | Performed by: INTERNAL MEDICINE

## 2020-08-10 PROCEDURE — 36415 COLL VENOUS BLD VENIPUNCTURE: CPT | Mod: ZL | Performed by: INTERNAL MEDICINE

## 2020-08-10 RX ORDER — LISINOPRIL AND HYDROCHLOROTHIAZIDE 20; 25 MG/1; MG/1
1 TABLET ORAL DAILY
Qty: 90 TABLET | Refills: 2 | Status: ON HOLD | OUTPATIENT
Start: 2020-08-10 | End: 2021-10-18

## 2020-08-10 RX ORDER — PIOGLITAZONEHYDROCHLORIDE 15 MG/1
TABLET ORAL
Qty: 90 TABLET | Refills: 2 | Status: SHIPPED | OUTPATIENT
Start: 2020-08-10

## 2020-08-10 RX ORDER — ATENOLOL 100 MG/1
100 TABLET ORAL 2 TIMES DAILY
Qty: 180 TABLET | Refills: 2 | Status: SHIPPED | OUTPATIENT
Start: 2020-08-10

## 2020-08-10 RX ORDER — AMLODIPINE BESYLATE 5 MG/1
5 TABLET ORAL DAILY
Qty: 90 TABLET | Refills: 2 | Status: ON HOLD | OUTPATIENT
Start: 2020-08-10 | End: 2021-10-18

## 2020-08-10 RX ORDER — LEVOTHYROXINE SODIUM 150 UG/1
150 TABLET ORAL DAILY
Qty: 90 TABLET | Refills: 2 | Status: SHIPPED | OUTPATIENT
Start: 2020-08-10

## 2020-08-10 RX ORDER — PREGABALIN 25 MG/1
25 CAPSULE ORAL 2 TIMES DAILY
Qty: 60 CAPSULE | Refills: 1 | Status: SHIPPED | OUTPATIENT
Start: 2020-08-10 | End: 2020-09-10 | Stop reason: SINTOL

## 2020-08-10 RX ORDER — GABAPENTIN 600 MG/1
TABLET ORAL
Qty: 180 TABLET | Refills: 9 | Status: SHIPPED | OUTPATIENT
Start: 2020-08-10 | End: 2020-09-12

## 2020-08-10 RX ORDER — GLIPIZIDE AND METFORMIN HCL 5; 500 MG/1; MG/1
2 TABLET, FILM COATED ORAL
Qty: 360 TABLET | Refills: 2 | Status: SHIPPED | OUTPATIENT
Start: 2020-08-10

## 2020-08-10 RX ORDER — PIOGLITAZONEHYDROCHLORIDE 30 MG/1
TABLET ORAL
Qty: 90 TABLET | Refills: 2 | Status: SHIPPED | OUTPATIENT
Start: 2020-08-10

## 2020-08-10 ASSESSMENT — PAIN SCALES - GENERAL: PAINLEVEL: MILD PAIN (2)

## 2020-08-10 NOTE — LETTER
August 17, 2020      Narda Bennett  [address]   [place] [address]        Dear ,    We are writing to inform you of your test results.    Your hemoglobin A1c has improved.  There is slight damage to the kidneys from your blood sugars and continued blood sugar control will help improve this.  Cholesterol has improved and you are kidney function and electrolytes are normal.  Continue to take your medications as prescribed and call with any questions.      Resulted Orders   Albumin Random Urine Quantitative with Creat Ratio   Result Value Ref Range    Creatinine Urine 54 mg/dL    Albumin Urine mg/L 17 mg/L    Albumin Urine mg/g Cr 31.84 (H) 0 - 25 mg/g Cr   Lipid Panel   Result Value Ref Range    Cholesterol 136 <200 mg/dL    Triglycerides 279 (H) <150 mg/dL      Comment:      Borderline high:  150-199 mg/dl  High:             200-499 mg/dl  Very high:       >499 mg/dl      HDL Cholesterol 42 23 - 92 mg/dL    LDL Cholesterol Calculated 38 <100 mg/dL      Comment:      Desirable:       <100 mg/dl    Non HDL Cholesterol 94 <130 mg/dL   Hemoglobin A1c   Result Value Ref Range    Hemoglobin A1C 7.4 (H) 4.0 - 6.0 %   Basic Metabolic Panel   Result Value Ref Range    Sodium 137 134 - 144 mmol/L    Potassium 4.0 3.5 - 5.1 mmol/L    Chloride 103 98 - 107 mmol/L    Carbon Dioxide 27 21 - 31 mmol/L    Anion Gap 7 3 - 14 mmol/L    Glucose 122 (H) 70 - 105 mg/dL    Urea Nitrogen 12 7 - 25 mg/dL    Creatinine 0.74 0.60 - 1.20 mg/dL    GFR Estimate 78 >60 mL/min/[1.73_m2]    GFR Estimate If Black >90 >60 mL/min/[1.73_m2]    Calcium 10.0 8.6 - 10.3 mg/dL       If you have any questions or concerns, please call the clinic at the number listed above.       Sincerely,        Patsy Gordon, DO

## 2020-08-10 NOTE — NURSING NOTE
Patient presents to clinic today for diabetic check and medication refills.  Medication reconciliation completed.  Previous A1C is not at goal of <8  Lab Results   Component Value Date    A1C 8.5 12/13/2019    A1C 8.1 09/12/2019    A1C 9.0 05/20/2019    A1C 8.8 02/08/2019    A1C 7.6 10/17/2018     Urine microalbumin:creatine: 2018  Foot exam 7/2/19  Eye exam no record    Tobacco User yes  Patient is on a daily aspirin  Patient is on a Statin.  Blood pressure today of:     BP Readings from Last 1 Encounters:   08/10/20 138/54      is at the goal of <139/89 for diabetics.    Zeina Bansal CMA(AAMA)..................8/10/2020   11:19 AM

## 2020-08-10 NOTE — PATIENT INSTRUCTIONS
Morning meds:  Lisinopril/Hydrochlorothiazide - take in morning because this one causes increased urination, start tomorrow morning  Atenolol  Glipizide/Metformin  Actos 30mg      Night meds:  Amlodipine  Aspirin  Atenolol  Fish oil  Glipizide/Metformin  Actos 15mg  Rosuvastatin (Crestor)    Stop gabapentin by decreasing over 1 week and then start Lyrica 25mg twice daily - call with side effects    Check your blood pressure every 1-2 days and record your readings.  I would like your blood pressure higher than 105/50 and lower than 140/90.    Patient Education     Healthy Meals for Diabetes  Ask your healthcare team to help you make a meal plan that fits your needs. Your meal plan tells you when to eat your meals and snacks, what kinds of foods to eat, and how much of each food to eat. You don t have to give up all the foods you like. But you do need to follow some guidelines.  Choose healthy carbohydrates  Starches, sugars, and fiber are all types of carbohydrates. Fiber can help lower your cholesterol and triglycerides. Fiber is also healthy for your heart. You should have 20 to 35 grams of total fiber each day. Fiber-rich foods include:    Whole-grain breads and cereals    Bulgur wheat    Brown rice   Whole-wheat pasta    Fruits and vegetables    Dry beans, and peas   Keep track of the amount of carbohydrates you eat. This can help you keep the right balance of physical activity and medicine. The amount of carbohydrates needed will vary for each person. It depends on many things such as your health, the medicines you take, and how active you are. Your healthcare team will help you figure out the right amount of carbohydrates for you. You may start with around 45 to 60 grams of carbohydrates per meal, depending on your situation.   Here are some examples of foods containing about 15 grams of carbohydrates (1 serving of carbohydrates):    1/2 cup of canned or frozen fruit    A small piece of fresh fruit (4  ounces)    1 slice of bread    1/2 cup of oatmeal    1/3 cup of rice    4 to 6 crackers    1/2 English muffin    1/2 cup of black beans    1/4 of a large baked potato (3 ounces)    2/3 cup of plain fat-free yogurt    1 cup of soup    1/2 cup of casserole    6 chicken nuggets    2-inch-square brownie or cake without frosting    2 small cookies    1/2 cup of ice cream or sherbet  Choose healthy protein foods  Eating protein that is low in fat can help you control your weight. It also helps keep your heart healthy. Low-fat protein foods include:    Fish    Plant proteins, such as dry beans and peas, nuts, and soy products like tofu and soymilk    Lean meat with all visible fat removed    Poultry with the skin removed    Low-fat or nonfat milk, cheese, and yogurt  Limit unhealthy fats and sugar  Saturated and trans fats are unhealthy for your heart. They raise LDL (bad) cholesterol. Fat is also high in calories, so it can make you gain weight. To cut down on unhealthy fats and sugar, limit these foods:    Butter or margarine    Palm and palm kernel oils and coconut oil    Cream    Cheese    Elaine    Lunch meats   Ice cream    Sweet bakery goods such as pies, muffins, and donuts    Jams and jellies    Candy bars    Regular sodas   How much to eat  The amount of food you eat affects your blood sugar. It also affects your weight. Your healthcare team will tell you how much of each type of food you should eat.    Use measuring cups and spoons and a food scale to measure serving sizes.    Learn what a correct serving size looks like on your plate. This will help when you are away from home and can t measure your servings. For example, a serving of meat is about the palm of your hand.    Eat only the number of servings given on your meal plan for each food. Don t take seconds.    Learn to read food labels. Be sure to look at serving size, total carbohydrates, fiber, calories, sugar, and saturated and trans fats. Look for  healthier alternatives to foods that have added sugar.    Plan ahead for parties so you can still have a good time without going overboard with unhealthy food choices. Set a good example yourself by bringing a healthy dish to pot lucks.   Choose healthy snacks  When it comes to snacks, we usually think about foods with added sugar and fats. But there are many other options for healthier snack choices. Here are a few snack ideas to choose from:  Snacks with less than 5 grams of carbohydrates    1 piece of string cheese    3 celery sticks plus 1 tablespoon of peanut butter    5 cherry tomatoes plus 1 tablespoon of ranch dressing    1 hard-boiled egg    1/4 cup of fresh blueberries     5 baby carrots    1 cup of light popcorn    1/2 cup of sugar-free gelatin    15 almonds  Snacks with about 10 to 20 grams of carbohydrates    1/3 cup of hummus plus 1 cup of fresh cut nonstarchy vegetables (carrots, green peppers, broccoli, celery, or a combination)    1/2 cup of fresh or canned fruit plus 1/4 cup of cottage cheese    1/2 cup of tuna salad with 4 crackers    2 rice cakes and a tablespoon of peanut butter    1 small apple or orange    3 cups light popcorn    1/2 of a turkey sandwich (1 slice of whole-wheat bread, 2 ounces of turkey, and mustard)  Portion sizes are important to controlling your blood sugar and staying at a healthy weight. Stock up on healthy snack items so you always have them on hand.  When to eat  Your meal plan will likely include breakfast, lunch, dinner, and some snacks.    Try to eat your meals and snacks at about the same times each day.    Eat all your meals and snacks. Skipping a meal or snack can make your blood sugar drop too low. It can also cause you to eat too much at the next meal or snack. Then your blood sugar could get too high.  Date Last Reviewed: 7/1/2016 2000-2019 [employer]. [address] [place] [address], [place] [address]. All rights reserved. This information is not  intended as a substitute for professional medical care. Always follow your healthcare professional's instructions.    Patient Education     Eating Heart-Healthy Food: Using the DASH Plan    Eating for your heart doesn t have to be hard or boring. You just need to know how to make healthier choices. The DASH eating plan has been developed to help you do just that. DASH stands for Dietary Approaches to Stop Hypertension. It is a plan that has been proven to be healthier for your heart and to lower your risk for high blood pressure. It can also help lower your risk for cancer, heart disease, osteoporosis, and diabetes.  Choosing from each food group  Choose foods from each of the food groups below each day. Try to get the recommended number of servings for each food group. The serving numbers are based on a diet of 2,000 calories a day. Talk to your doctor if you re unsure about your calorie needs. Along with getting the correct servings, the DASH plan also recommends a sodium intake less than 2,300 mg per day.        Grains  Servings: 6 to 8 a day  A serving is:    1 slice bread    1 ounce dry cereal    Half a cup cooked rice, pasta or cereal  Best choices: Whole grains and any grains high in fiber. Vegetables  Servings: 4 to 5 a day  A serving is:    1 cup raw leafy vegetable    Half a cup cut-up raw or cooked vegetable    Half a cup vegetable juice  Best choices: Fresh or frozen vegetables prepared without added salt or fat.   Fruits  Servings: 4 to 5 a day  A serving is:    1 medium fruit    One-quarter cup dried fruit    Half a cup fresh, frozen, or canned fruit    Half a cup of 100% fruit juices  Best choices: A variety of fresh fruits of different colors. Whole fruits are a better choice than fruit juices. Low-fat or fat-free dairy  Servings: 2 to 3 a day  A serving is:    1 cup milk    1 cup yogurt    One and a half ounces cheese  Best choices: Skim or 1% milk, low-fat or fat-free yogurt or buttermilk, and  low-fat cheeses.         Lean meats, poultry, fish  Servings: 6 or fewer a day  A serving is:    1 ounce cooked meats, poultry, or fish    1 egg  Best choices: Lean poultry and fish. Trim away visible fat. Broil, grill, roast, or boil instead of frying. Remove skin from poultry before eating. Limit how much red meat you eat.  Nuts, seeds, beans  Servings: 4 to 5 a week  A serving is:    One-third cup nuts (one and a half ounces)    2 tablespoons nut butter or seeds    Half a cup cooked dry beans or legumes  Best choices: Dry roasted nuts with no salt added, lentils, kidney beans, garbanzo beans, and whole batista beans.   Fats and oils  Servings: 2 to 3 a day  A serving is:    1 teaspoon vegetable oil    1 teaspoon soft margarine    1 tablespoon mayonnaise    2 tablespoons salad dressing  Best choices: Nut and vegetable oils (nontropical vegetable oils), such as olive and canola oil. Sweets  Servings: 5 a week or fewer  A serving is:    1 tablespoon sugar, maple syrup, or honey    1 tablespoon jam or jelly    1 half-ounce jelly beans (about 15)    1 cup lemonade  Best choices: Dried fruit can be a satisfying sweet. Choose low-fat sweets. And watch your serving sizes!      For more on the DASH eating plan, visit:  www.nhlbi.nih.gov/health/health-topics/topics/dash   Date Last Reviewed: 6/1/2016 2000-2019 [employer]. [address] [place] [address], [place] [address]. All rights reserved. This information is not intended as a substitute for professional medical care. Always follow your healthcare professional's instructions.

## 2020-08-10 NOTE — PROGRESS NOTES
Nursing Notes:   Zeina Bansal CMA  8/10/2020 11:31 AM  Signed  Patient presents to clinic today for diabetic check and medication refills.  Medication reconciliation completed.  Previous A1C is not at goal of <8  Lab Results   Component Value Date    A1C 8.5 12/13/2019    A1C 8.1 09/12/2019    A1C 9.0 05/20/2019    A1C 8.8 02/08/2019    A1C 7.6 10/17/2018     Urine microalbumin:creatine: 2018  Foot exam 7/2/19  Eye exam no record    Tobacco User yes  Patient is on a daily aspirin  Patient is on a Statin.  Blood pressure today of:     BP Readings from Last 1 Encounters:   08/10/20 138/54      is at the goal of <139/89 for diabetics.    Zeina Bansal [facility])..................8/10/2020   11:19 AM            Chief Complaint   Patient presents with     Diabetes     Medication Request         HPI: Ms. Bennett is a 65 year old female who presents today for follow up of pain.    Nursing note reviewed with patient.  Accurracy and completeness verified.      Patients diabetes is uncontrolled. She currently denies significant lows.  She denies obvious side effects from diabetic medications which include Actos, metformin and glipizide.  She is due for foot exam and prescription for diabetic shoes today.    She has a history of hypertension.  She continues on amlodipine and atenolol.  Her blood pressure overall has been controlled and she denies any side effects.    She has ongoing issues with peripheral neuropathy.  She is on gabapentin and is not sure that it is helping as much.  She also has had some side effects with it.  She is interested in trying something different.    She has hypothyroidism and needs a recheck of her TSH.  She denies any signs of high or low thyroid.    Past medical history reviewed as below:     Past Medical History:   Diagnosis Date     Atopic dermatitis 08/20/2013     Autoimmune thyroiditis     Hashimotos     Benign paroxysmal vertigo 03/21/2013     Calculus of kidney 01/22/2011      "Closed fracture of neck of femur (H) 05/07/2015    after a fall     Disorder of cartilage      Essential hypertension 10/20/2015     Hearing loss      Lateral epicondylitis 7/25/2006    Overview:  IMO Update 10/11     Other intervertebral disc degeneration, lumbosacral region     No Comments Provided     Other specified symptoms and signs involving the circulatory and respiratory systems 08/02/2012     Peripheral vascular disease (H) 08/02/2012    with claudication     Polyp of colon     with low grade colon polyp at the hepatic flexure and at 22 cm.; sessile serrated adenoma with low grade dysplasia colon polyp at the splenic flexure.     Pregnancy     G4, P3, A questionable 1, and 1 stillborn of a twin     Pure hypercholesterolemia      Shoulder impingement, right 09/15/2010     Tobacco use      Type 2 diabetes mellitus without complications (H)    .      ROS  Pertinent ROS was performed and was negative, including for fever, chills, chest pain, shortness of breath, increased lower extremity edema, changes in bowel or bladder, blood in the stool, difficulty swallowing, sores in the mouth. No other concerns, with exception of HPI above.      EXAM:   /54 (BP Location: Right arm, Patient Position: Sitting, Cuff Size: Adult Large)   Pulse 68   Temp 97.6  F (36.4  C) (Tympanic)   Resp 18   Wt 83.8 kg (184 lb 12.8 oz)   SpO2 97%   BMI 30.75 kg/m      Estimated body mass index is 30.75 kg/m  as calculated from the following:    Height as of 2/28/20: 1.651 m (5' 5\").    Weight as of this encounter: 83.8 kg (184 lb 12.8 oz).      GEN: Vitals reviewed. Healthy appearing. Patient is in no acute distress. Cooperative with exam.  HEENT: Normocephalic atraumatic.  Eyes grossly normal to inspection.  No discharge or erythema, or obvious scleral/conjunctival abnormalities.    NECK: Supple; no thyromegaly or masses noted.  No cervical or supraclavicular lymphadenopathy.  CV: Heart regular in rate and rhythm with no " murmur.    LUNGS: No audible wheeze, cough, or visible cyanosis.  No visible retractions or increased work of breathing.  Lungs clear to auscultation bilaterally.    ABD:  Obese.  SKIN: Warm and dry to touch.  Visible skin clear. No significant rash, abnormal pigmentation or lesions.  EXT: No clubbing or cyanosis.  No peripheral edema.  DIABETIC FOOT EXAM: No sores or lesions on feet bilaterally, no fungus noted, no erythema or ulceration, pulses decreased bilaterally, monofilament with decrease in sensation bilaterally, especially over callus formation on the heels and the 4th and 5th digits on the left foot.  Minimal deformity noted but toe nail deformity is present.  Mild callus formation noted.  PSYCH: Mood is good.  Mentation appears normal, affect normal/bright, judgement and insight intact, normal speech and appearance well-groomed.     ASSESSMENT AND PLAN:  Benign essential hypertension  - Blood pressure today of 138/54   is at the goal of <140/90 with no exacerbation.  - Continue current regimen.  Instructed to check BP at home.  - Cautioned patient to monitor with antibiotics, herbals and any OTC medications  - electrolytes and renal function done and ok  - amLODIPine (NORVASC) 5 MG tablet  Dispense: 90 tablet; Refill: 2  - atenolol (TENORMIN) 100 MG tablet  Dispense: 180 tablet; Refill: 2  - lisinopril-hydrochlorothiazide (ZESTORETIC) 20-25 MG tablet  Dispense: 90 tablet; Refill: 2  - Basic Metabolic Panel    Diabetic polyneuropathy associated with type 2 diabetes mellitus (H)  At this time we will taper off the gabapentin and try Lyrica given the lack of efficacy and some side effects from the gabapentin.  She is to call with any issues with this.  She will be seen back in a month to recheck how she is doing.  Given her calluses, neuropathy and foot deformity diabetic shoe order is placed.  - gabapentin (NEURONTIN) 600 MG tablet  Dispense: 180 tablet; Refill: 9  - pregabalin (LYRICA) 25 MG capsule   Dispense: 60 capsule; Refill: 1  - order for DME  Dispense: 1 each; Refill: 0    Uncontrolled type 2 diabetes mellitus with hyperglycemia (H)  Blood sugars have traditionally been uncontrolled.  Recheck A1c today.  Order placed for diabetic shoes.- glipiZIDE-metFORMIN (METAGLIP) 5-500 MG tablet  Dispense: 360 tablet; Refill: 2  - pioglitazone (ACTOS) 15 MG tablet  Dispense: 90 tablet; Refill: 2  - pioglitazone (ACTOS) 30 MG tablet  Dispense: 90 tablet; Refill: 2  - Hemoglobin A1c  - Lipid Panel  - Albumin Random Urine Quantitative with Creat Ratio    Hypothyroidism, unspecified type  Stable, TSH rechecked, continue current medication dose  - levothyroxine (SYNTHROID/LEVOTHROID) 150 MCG tablet  Dispense: 90 tablet; Refill: 2    Toenail deformity  - order for DME  Dispense: 1 each; Refill: 0    Callus of foot  - order for DME  Dispense: 1 each; Refill: 0           Return in about 1 month (around 9/10/2020) for Recheck pain, medications.    ADITHYA VALERA DO   8/10/2020 11:31 AM    This document was prepared using voice generated softwear. While every attempt was made for accuracy, grammatical errors may exist.

## 2020-08-11 LAB
CREAT UR-MCNC: 54 MG/DL
MICROALBUMIN UR-MCNC: 17 MG/L
MICROALBUMIN/CREAT UR: 31.84 MG/G CR (ref 0–25)

## 2020-09-01 ENCOUNTER — TELEPHONE (OUTPATIENT)
Dept: INTERNAL MEDICINE | Facility: OTHER | Age: 65
End: 2020-09-01

## 2020-09-01 DIAGNOSIS — E78.00 HYPERCHOLESTEROLEMIA: ICD-10-CM

## 2020-09-01 DIAGNOSIS — E11.42 DIABETIC POLYNEUROPATHY ASSOCIATED WITH TYPE 2 DIABETES MELLITUS (H): ICD-10-CM

## 2020-09-01 RX ORDER — PREGABALIN 25 MG/1
25 CAPSULE ORAL 2 TIMES DAILY
Qty: 60 CAPSULE | Refills: 1 | Status: CANCELLED | OUTPATIENT
Start: 2020-09-01

## 2020-09-01 NOTE — TELEPHONE ENCOUNTER
States the pharmacy told her to call regarding her lyrica. She said that she is supposed to tell [facility] that it is supposed to be dispensed as written

## 2020-09-09 ENCOUNTER — TELEPHONE (OUTPATIENT)
Dept: INTERNAL MEDICINE | Facility: OTHER | Age: 65
End: 2020-09-09

## 2020-09-09 RX ORDER — GABAPENTIN 600 MG/1
600 TABLET ORAL 3 TIMES DAILY
Status: CANCELLED | OUTPATIENT
Start: 2020-09-09

## 2020-09-09 NOTE — TELEPHONE ENCOUNTER
"Spoke with patient. She states that she would like to go back on gabapentin. Lyrica is to expensive and she got \"all itchy\" after taking it. Please advise. Malathi Stover LPN .............9/9/2020  3:35 PM    "

## 2020-09-09 NOTE — TELEPHONE ENCOUNTER
Please clarify with her pharmacy.  Are they looking to have brand-name Lyrica?  If so a new prescription will need to be set up.

## 2020-09-09 NOTE — TELEPHONE ENCOUNTER
Called and spoke with Tete at pharmacy. They dispensed generic. They state that it is made by [employer] which is the same company as the Brand Norvasc.   Left a message for patient to call back to inform of the above. Malathi Stover LPN .............9/9/2020  2:58 PM

## 2020-09-10 NOTE — TELEPHONE ENCOUNTER
Please clarify if patient still has gabapentin at home.  If so she can restart and gradually increase.  If not I will have to send in a new prescription.  Please add Lyrica to her allergy/intolerance list.

## 2020-09-10 NOTE — TELEPHONE ENCOUNTER
Narda called the pharmacy asking for a refill of her gabapentin since she is stopping Lyrica. Please send us a new Rx with tapering directions to gradually increase the dose up if appropriate.     Thanks    Clarence Padilla, PharmD  [facility]

## 2020-09-10 NOTE — TELEPHONE ENCOUNTER
No answer. She was given #180 with 9 refills on 8/10/20. No new prescription should be necessary.  Zeina Bansal CMA([facility])..................9/10/2020   1:54 PM

## 2020-09-10 NOTE — TELEPHONE ENCOUNTER
Notified patient of providers note.  Caren Aguilar LPN ....................  9/10/2020   2:25 PM

## 2020-09-12 RX ORDER — GABAPENTIN 300 MG/1
300 CAPSULE ORAL 3 TIMES DAILY
Qty: 90 CAPSULE | Refills: 2 | Status: SHIPPED | OUTPATIENT
Start: 2020-09-12 | End: 2020-09-28

## 2020-09-12 NOTE — TELEPHONE ENCOUNTER
Prescription sent.  Patient will follow-up with me on September 18 to discuss increased dosing if needed.

## 2020-09-18 ENCOUNTER — OFFICE VISIT (OUTPATIENT)
Dept: INTERNAL MEDICINE | Facility: OTHER | Age: 65
End: 2020-09-18
Attending: INTERNAL MEDICINE
Payer: MEDICARE

## 2020-09-18 VITALS
WEIGHT: 181.6 LBS | RESPIRATION RATE: 16 BRPM | HEART RATE: 68 BPM | SYSTOLIC BLOOD PRESSURE: 138 MMHG | TEMPERATURE: 97.6 F | BODY MASS INDEX: 30.22 KG/M2 | OXYGEN SATURATION: 98 % | DIASTOLIC BLOOD PRESSURE: 68 MMHG

## 2020-09-18 DIAGNOSIS — Z79.899 HIGH RISK MEDICATION USE: ICD-10-CM

## 2020-09-18 DIAGNOSIS — R19.7 DIARRHEA, UNSPECIFIED TYPE: ICD-10-CM

## 2020-09-18 DIAGNOSIS — L60.8 TOENAIL DEFORMITY: ICD-10-CM

## 2020-09-18 DIAGNOSIS — E11.42 DIABETIC POLYNEUROPATHY ASSOCIATED WITH TYPE 2 DIABETES MELLITUS (H): Primary | ICD-10-CM

## 2020-09-18 DIAGNOSIS — L84 CALLUS OF FOOT: ICD-10-CM

## 2020-09-18 LAB
ALBUMIN SERPL-MCNC: 4.5 G/DL (ref 3.5–5.7)
ALP SERPL-CCNC: 61 U/L (ref 34–104)
ALT SERPL W P-5'-P-CCNC: 12 U/L (ref 7–52)
AST SERPL W P-5'-P-CCNC: 13 U/L (ref 13–39)
BILIRUB DIRECT SERPL-MCNC: <0.1 MG/DL (ref 0–0.2)
BILIRUB SERPL-MCNC: 0.2 MG/DL (ref 0.3–1)
PROT SERPL-MCNC: 7 G/DL (ref 6.4–8.9)
TSH SERPL DL<=0.05 MIU/L-ACNC: 0.72 IU/ML (ref 0.34–5.6)

## 2020-09-18 PROCEDURE — 80076 HEPATIC FUNCTION PANEL: CPT | Mod: ZL | Performed by: INTERNAL MEDICINE

## 2020-09-18 PROCEDURE — 99214 OFFICE O/P EST MOD 30 MIN: CPT | Performed by: INTERNAL MEDICINE

## 2020-09-18 PROCEDURE — 84443 ASSAY THYROID STIM HORMONE: CPT | Mod: ZL | Performed by: INTERNAL MEDICINE

## 2020-09-18 PROCEDURE — G0463 HOSPITAL OUTPT CLINIC VISIT: HCPCS

## 2020-09-18 PROCEDURE — 36415 COLL VENOUS BLD VENIPUNCTURE: CPT | Mod: ZL | Performed by: INTERNAL MEDICINE

## 2020-09-18 PROCEDURE — 80307 DRUG TEST PRSMV CHEM ANLYZR: CPT | Mod: ZL | Performed by: INTERNAL MEDICINE

## 2020-09-18 RX ORDER — GABAPENTIN 600 MG/1
600 TABLET ORAL 3 TIMES DAILY
Qty: 30 TABLET | Refills: 0 | Status: SHIPPED | OUTPATIENT
Start: 2020-09-18 | End: 2020-09-28

## 2020-09-18 ASSESSMENT — PAIN SCALES - GENERAL: PAINLEVEL: SEVERE PAIN (7)

## 2020-09-18 NOTE — NURSING NOTE
Patient presents to clinic today for follow up on her medications. She couldn't afford the brand name Lyrica and the generic made her sick. She needs more Gabapentin, higher dose.  Medication reconciliation completed.   pulled up.  Zeina Bansal CMA([facility])..................9/18/2020   11:56 AM

## 2020-09-18 NOTE — PATIENT INSTRUCTIONS
Consider 1 imodium (Loperamide) as needed the mornings you have runny stools.  This is bought over the counter.  Watch for constipation

## 2020-09-18 NOTE — PROGRESS NOTES
Chief Complaint   Patient presents with     Recheck Medication     Gabapentin         HPI: Ms. Bennett is a 65 year old female who presents today for follow up of her pain medications.    She is currently on gabapentin for her diabetic neuropathy.  We did try tapering off of this and trialing Lyrica however she had significant side effects with it.  She was restarted on gabapentin 300 mg 3 times daily.  She reports that this has not been effective in controlling her pain.  She did fill her prescription for 90 pills on September 14 however reports that she only has approximately 5 pills left.  She tells me today that the pharmacy only gave her a partial fill.  She denies any side effects of the gabapentin after going back on it.    She does need a reprint of her prescription for diabetic shoes.  She has found a location that these can be obtained.  A foot exam was done a month ago.    She has been having some diarrhea that has occurred over the last several weeks intermittently.  She denies any blood in the stool.  She is curious with this may be related to.    She  reports that she has been smoking cigarettes. She has been smoking about 0.00 packs per day for the past 40.00 years. She has never used smokeless tobacco.    Past medical history reviewed as below:     Past Medical History:   Diagnosis Date     Atopic dermatitis 08/20/2013     Autoimmune thyroiditis     [facility]imotos     Benign paroxysmal vertigo 03/21/2013     Calculus of kidney 01/22/2011     Closed fracture of neck of femur (H) 05/07/2015    after a fall     Disorder of cartilage      Essential hypertension 10/20/2015     Hearing loss      Lateral epicondylitis 7/25/2006    Overview:  IMO Update 10/11     Other intervertebral disc degeneration, lumbosacral region     No Comments Provided     Other specified symptoms and signs involving the circulatory and respiratory systems 08/02/2012     Peripheral vascular disease (H) 08/02/2012    with claudication  "    Polyp of colon     with low grade colon polyp at the hepatic flexure and at 22 cm.; sessile serrated adenoma with low grade dysplasia colon polyp at the splenic flexure.     Pregnancy     G4, P3, A questionable 1, and 1 stillborn of a twin     Pure hypercholesterolemia      Shoulder impingement, right 09/15/2010     Tobacco use      Type 2 diabetes mellitus without complications (H)    .      ROS  Pertinent ROS was performed and was negative, including for fever, chills, chest pain, shortness of breath, increased lower extremity edema, changes in bladder, blood in the stool. No other concerns, with exception of HPI above.      EXAM:   /68 (BP Location: Right arm, Patient Position: Sitting, Cuff Size: Adult Regular)   Pulse 68   Temp 97.6  F (36.4  C) (Tympanic)   Resp 16   Wt 82.4 kg (181 lb 9.6 oz)   SpO2 98%   BMI 30.22 kg/m      Estimated body mass index is 30.22 kg/m  as calculated from the following:    Height as of 2/28/20: 1.651 m (5' 5\").    Weight as of this encounter: 82.4 kg (181 lb 9.6 oz).      GEN: Vitals reviewed. Healthy appearing. Patient is in no acute distress. Cooperative with exam.  HEENT: Normocephalic atraumatic.  Eyes grossly normal to inspection.  No discharge or erythema, or obvious scleral/conjunctival abnormalities.   CV: Heart regular in rate and rhythm with no murmur.    LUNGS: No audible wheeze, cough, or visible cyanosis.  No visible retractions or increased work of breathing.  Lungs clear to auscultation bilaterally.    ABD:  Obese.  SKIN: Warm and dry to touch.  Visible skin clear. No significant rash, abnormal pigmentation or lesions.  EXT: No clubbing or cyanosis.  No peripheral edema.  PSYCH: Mood is good.  Mentation appears normal, affect normal/bright, judgement and insight intact, normal speech and appearance well-groomed.     ASSESSMENT AND PLAN:    Diabetic polyneuropathy associated with type 2 diabetes mellitus (H)  Diabetic shoes provided.  At this time we " will increase her gabapentin to 600 mg 3 times a day.  She was encouraged to stay on the lowest needed dose.  Due to the discrepancy in her pill count today I did contact the pharmacy.  They assure me that they would not a given a partial fill for gabapentin.  Per the prescription monitoring per report and to [facility] pharmacy they do state that she obtained 90 pills.  We will obtain a drug screen today.  If there are discrepancies in the drug screen or continued issues we will need to discuss it further.  - Miscellaneous Order for DME - ONLY FOR DME  - gabapentin (NEURONTIN) 600 MG tablet  Dispense: 30 tablet; Refill: 0    Toenail deformity  Prescription for diabetic shoes provided  - Miscellaneous Order for DME - ONLY FOR DME    Callus of foot  Prescription for diabetic shoes provided  - Miscellaneous Order for DME - ONLY FOR DME    High risk medication use  - Pain Drug Scr UR W Rptd Meds    Diarrhea, unspecified type  We will check thyroid and liver enzymes as these have not been done recently.  If she has continued issues it would be recommended that we consider stool studies.  We discussed that it may be related to medications we may need to adjust some of her meds.  - Hepatic Function Panel  - TSH Reflex GH                 Return in about 10 days (around 9/28/2020) for Recheck pain.    A total of 27 minutes spent with in face-to-face consultation of this patient with greater than 50% spent in counseling and care coordination of above listed medical problems including diagnosis, treatment options with emphasis on risks and benefits of each,prognosis and importance of compliance for each.      ADITHYA VALERA DO   9/18/2020 12:08 PM    This document was prepared using voice generated softwear. While every attempt was made for accuracy, grammatical errors may exist.

## 2020-09-18 NOTE — LETTER
September 19, 2020      Narda Bennett     [place] [address]        Dear ,    We are writing to inform you of your test results.    Your test results fall within the expected range(s) or remain unchanged from previous results.  Please continue with current treatment plan.    Resulted Orders   Hepatic Function Panel   Result Value Ref Range    Bilirubin Direct <0.1 0.0 - 0.2 mg/dL    Bilirubin Total 0.2 (L) 0.3 - 1.0 mg/dL    Albumin 4.5 3.5 - 5.7 g/dL    Protein Total 7.0 6.4 - 8.9 g/dL    Alkaline Phosphatase 61 34 - 104 U/L    ALT 12 7 - 52 U/L    AST 13 13 - 39 U/L   TSH Reflex GH   Result Value Ref Range    TSH Reflex 0.72 0.34 - 5.60 IU/mL       If you have any questions or concerns, please call the clinic at the number listed above.       Sincerely,        Patsy Gordno, DO

## 2020-09-24 LAB — PAIN DRUG SCR UR W RPTD MEDS: NORMAL

## 2020-09-28 ENCOUNTER — VIRTUAL VISIT (OUTPATIENT)
Dept: INTERNAL MEDICINE | Facility: OTHER | Age: 65
End: 2020-09-28
Attending: INTERNAL MEDICINE
Payer: COMMERCIAL

## 2020-09-28 DIAGNOSIS — E11.42 DIABETIC POLYNEUROPATHY ASSOCIATED WITH TYPE 2 DIABETES MELLITUS (H): ICD-10-CM

## 2020-09-28 PROCEDURE — 99213 OFFICE O/P EST LOW 20 MIN: CPT | Mod: 95 | Performed by: INTERNAL MEDICINE

## 2020-09-28 RX ORDER — GABAPENTIN 600 MG/1
TABLET ORAL
Qty: 90 TABLET | Refills: 0 | Status: SHIPPED | OUTPATIENT
Start: 2020-09-28 | End: 2020-10-12

## 2020-09-28 ASSESSMENT — PAIN SCALES - GENERAL: PAINLEVEL: SEVERE PAIN (7)

## 2020-09-28 NOTE — NURSING NOTE
Patient is having a virtual visit to follow up on medication gabapentin. States the med is not doing its job, feels needs to be increased.     No LMP recorded (lmp unknown). Patient is postmenopausal.  Medication Reconciliation: complete    Nany Culp LPN  9/28/2020 8:06 AM

## 2020-09-28 NOTE — PROGRESS NOTES
"Narda Bennett is a 65 year old female who is being evaluated via a billable telephone visit.      The patient has been notified of following:     \"This telephone visit will be conducted via a call between you and your physician/provider. We have found that certain health care needs can be provided without the need for a physical exam.  This service lets us provide the care you need with a short phone conversation.  If a prescription is necessary we can send it directly to your pharmacy.  If lab work is needed we can place an order for that and you can then stop by our lab to have the test done at a later time.    Telephone visits are billed at different rates depending on your insurance coverage. During this emergency period, for some insurers they may be billed the same as an in-person visit.  Please reach out to your insurance provider with any questions.    If during the course of the call the physician/provider feels a telephone visit is not appropriate, you will not be charged for this service.\"    Patient has given verbal consent for Telephone visit?  Yes    What phone number would you like to be contacted at? [phone]    How would you like to obtain your AVS? Mail a copy    Subjective     Narda Bennett is a 65 year old female who presents via phone visit today for the following health issues:    She has a history of chronic pain secondary to diabetic neuropathy.  This is related to her type 2 diabetes.  We tried going off the gabapentin however she did not tolerate Lyrica.  We have been titrating up on her dose.  Most recently she has been taking 600 mg 3 times a day.  She does take these at 6am, noon and 6pm    She does not feel they are working.  She is out of the tablet and capsules.  She most recently took a tablet this morning.    She has not had any side effects or concerns returning to the gabapentin.  She would like to return to her prior dosing.  There was a discrepancy in how she was " taking her prior dosing.  I suspect she was taking her 300 mg tablets per her prior dosing regimen.  Her drug screen was normal.  She denies any drug diversion.      Review of Systems   She denies any fevers or chills.  She denies any chest pain or breathing problems.       Objective      Vitals:  No vitals were obtained today due to virtual visit.    healthy, alert and no distress  PSYCH: Alert and oriented times 3; coherent speech, normal   rate and volume, able to articulate logical thoughts, able   to abstract reason, no tangential thoughts, no hallucinations   or delusions  Her affect is normal  RESP: No cough, no audible wheezing, able to talk in full sentences  Remainder of exam unable to be completed due to telephone visits    Assessment/Plan:    1. Diabetic polyneuropathy associated with type 2 diabetes mellitus (H)  Resume prior dosing.  She is to call with any side effects with the increase in dose.  She is to follow-up with me in 2 weeks.  - gabapentin (NEURONTIN) 600 MG tablet; Take 2 tablets (1,200 mg) by mouth every morning AND 1 tablet (600 mg) daily AND 3 tablets (1,800 mg) every evening.  Dispense: 90 tablet; Refill: 0      Phone call duration:  11 minutes

## 2020-10-12 ENCOUNTER — VIRTUAL VISIT (OUTPATIENT)
Dept: INTERNAL MEDICINE | Facility: OTHER | Age: 65
End: 2020-10-12
Attending: INTERNAL MEDICINE
Payer: COMMERCIAL

## 2020-10-12 ENCOUNTER — TELEPHONE (OUTPATIENT)
Dept: INTERNAL MEDICINE | Facility: OTHER | Age: 65
End: 2020-10-12

## 2020-10-12 DIAGNOSIS — E11.42 DIABETIC POLYNEUROPATHY ASSOCIATED WITH TYPE 2 DIABETES MELLITUS (H): ICD-10-CM

## 2020-10-12 PROCEDURE — 99441 PR PHYSICIAN TELEPHONE EVALUATION 5-10 MIN: CPT | Performed by: INTERNAL MEDICINE

## 2020-10-12 RX ORDER — GABAPENTIN 600 MG/1
TABLET ORAL
Qty: 90 TABLET | Refills: 0 | Status: SHIPPED | OUTPATIENT
Start: 2020-10-12

## 2020-10-12 SDOH — HEALTH STABILITY: MENTAL HEALTH: HOW OFTEN DO YOU HAVE A DRINK CONTAINING ALCOHOL?: NEVER

## 2020-10-12 NOTE — NURSING NOTE
Chief Complaint   Patient presents with     Recheck Medication     out gabapentin      Patient requested telephone visit for medication follow up    Patient stated she takes Gabapentin: 2 capsules in the morning, 1 capsule in the afternoon, 3 capsules at night       Medication Reconciliation: complete    Elvie Corona LPN

## 2020-10-12 NOTE — TELEPHONE ENCOUNTER
Pharmacy wanted to verify dose increase, per recent OV note pt had an increase in her gabapentin dosage.     Mayelin Akins RN  ....................  10/12/2020   4:59 PM

## 2020-10-12 NOTE — PROGRESS NOTES
"Narda Bennett is a 65 year old female who is being evaluated via a billable telephone visit.      The patient has been notified of following:     \"This telephone visit will be conducted via a call between you and your physician/provider. We have found that certain health care needs can be provided without the need for a physical exam.  This service lets us provide the care you need with a short phone conversation.  If a prescription is necessary we can send it directly to your pharmacy.  If lab work is needed we can place an order for that and you can then stop by our lab to have the test done at a later time.    Telephone visits are billed at different rates depending on your insurance coverage. During this emergency period, for some insurers they may be billed the same as an in-person visit.  Please reach out to your insurance provider with any questions.    If during the course of the call the physician/provider feels a telephone visit is not appropriate, you will not be charged for this service.\"    Patient has given verbal consent for Telephone visit?  Yes    What phone number would you like to be contacted at? [phone] [place]    How would you like to obtain your AVS? [facility] Follow up Gabapentin    Narda Bennett is a 65 year old female who presents via phone visit today for the following health issues:    She has ongoing diabetic neuropathy.  She has been on gabapentin long-term.  We recently try to switch her to Lyrica due to some concerns for side effects from her gabapentin however this did not control her pain.  She restarted her gabapentin and has titrated up to her prior dose.  She feels that this is controlled her pain.  She would like to continue with current dosing.  There was some discrepancy about what happened to several pills that were prescribed and filled in mid September.  She insists that they were not given to her by the pharmacy although the pharmacy states " they do not split prescriptions of gabapentin.    Patient reports today that she would like all of her care to be through [facility].  She is establishing with a new primary care provider there.       Objective          Vitals:  No vitals were obtained today due to virtual visit.    healthy, alert and no distress  PSYCH: Alert and oriented times 3; coherent speech, normal   rate and volume, able to articulate logical thoughts, able   to abstract reason, no tangential thoughts, no hallucinations   or delusions  Her affect is normal  RESP: No cough, no audible wheezing, able to talk in full sentences  Remainder of exam unable to be completed due to telephone visits         Assessment/Plan:    1. Diabetic polyneuropathy associated with type 2 diabetes mellitus (H)  -Gabapentin renewed for 2 weeks.  She is to establish with a new primary care provider as scheduled.    - gabapentin (NEURONTIN) 600 MG tablet; Take 2 tablets (1,200 mg) by mouth every morning AND 1 tablet (600 mg) daily AND 3 tablets (1,800 mg) every evening.  Dispense: 90 tablet; Refill: 0      Phone call duration:  5 minutes

## 2021-10-14 ENCOUNTER — PATIENT OUTREACH (OUTPATIENT)
Dept: INTERNAL MEDICINE | Facility: OTHER | Age: 66
End: 2021-10-14

## 2021-10-14 NOTE — TELEPHONE ENCOUNTER
Patient Quality Outreach      Summary:    Patient has the following on her problem list/HM:     Diabetes    Last A1C:  Lab Results   Component Value Date    A1C 7.4 08/10/2020    A1C 8.5 12/13/2019       Last LDL:    Lab Results   Component Value Date    LDL 38 08/10/2020       Is the patient on a Statin? Yes          Is the patient on Aspirin? Yes    Medications     HMG CoA Reductase Inhibitors     rosuvastatin (CRESTOR) 20 MG tablet       Salicylates     aspirin (ASA) 81 MG tablet             Last three blood pressure readings:  BP Readings from Last 3 Encounters:   09/18/20 138/68   08/10/20 138/54   02/28/20 110/60            Tobacco Use      Smoking status: Current Some Day Smoker        Packs/day: 0.00        Years: 40.00        Pack years: 0        Types: Cigarettes      Smokeless tobacco: Never Used      Tobacco comment: Quit smoking: down to 1 or 2 daily          Patient is due/failing the following:   Diabetes -  Eye Exam    Type of outreach:    Sent letter.    Questions for provider review:    None                                                                                                                                     Malathi Stover LPN .............10/14/2021  3:11 PM       Chart routed to MEET.

## 2021-10-17 ENCOUNTER — APPOINTMENT (OUTPATIENT)
Dept: CT IMAGING | Facility: OTHER | Age: 66
DRG: 065 | End: 2021-10-17
Attending: FAMILY MEDICINE
Payer: MEDICARE

## 2021-10-17 ENCOUNTER — HOSPITAL ENCOUNTER (INPATIENT)
Facility: OTHER | Age: 66
LOS: 1 days | Discharge: HOME | DRG: 065 | End: 2021-10-18
Attending: FAMILY MEDICINE | Admitting: INTERNAL MEDICINE
Payer: MEDICARE

## 2021-10-17 ENCOUNTER — APPOINTMENT (OUTPATIENT)
Dept: GENERAL RADIOLOGY | Facility: OTHER | Age: 66
DRG: 065 | End: 2021-10-17
Attending: FAMILY MEDICINE
Payer: MEDICARE

## 2021-10-17 DIAGNOSIS — I10 ESSENTIAL HYPERTENSION, MALIGNANT: ICD-10-CM

## 2021-10-17 DIAGNOSIS — Z79.82 ENCOUNTER FOR LONG-TERM (CURRENT) USE OF ASPIRIN: ICD-10-CM

## 2021-10-17 DIAGNOSIS — I73.9 PERIPHERAL VASCULAR DISEASE, UNSPECIFIED (H): ICD-10-CM

## 2021-10-17 DIAGNOSIS — Z79.899 ENCOUNTER FOR LONG-TERM (CURRENT) USE OF MEDICATIONS: ICD-10-CM

## 2021-10-17 DIAGNOSIS — F17.210 CIGARETTE SMOKER: ICD-10-CM

## 2021-10-17 DIAGNOSIS — Z79.51 LONG TERM CURRENT USE OF INHALED STEROID: ICD-10-CM

## 2021-10-17 DIAGNOSIS — I63.9 CEREBROVASCULAR ACCIDENT (CVA), UNSPECIFIED MECHANISM (H): ICD-10-CM

## 2021-10-17 DIAGNOSIS — R29.702 NATIONAL INSTITUTES OF HEALTH STROKE SCALE (NIHSS) TOTAL SCORE 2: ICD-10-CM

## 2021-10-17 DIAGNOSIS — E11.9 TYPE 2 DIABETES MELLITUS WITHOUT COMPLICATION, UNSPECIFIED WHETHER LONG TERM INSULIN USE (H): ICD-10-CM

## 2021-10-17 DIAGNOSIS — Z11.52 ENCOUNTER FOR SCREENING LABORATORY TESTING FOR COVID-19 VIRUS: ICD-10-CM

## 2021-10-17 PROBLEM — R91.1 LUNG NODULE: Status: ACTIVE | Noted: 2021-10-17

## 2021-10-17 LAB
ANION GAP SERPL CALCULATED.3IONS-SCNC: 9 MMOL/L (ref 3–14)
APTT PPP: 29 SECONDS (ref 22–38)
BASOPHILS # BLD AUTO: 0.1 10E3/UL (ref 0–0.2)
BASOPHILS NFR BLD AUTO: 1 %
BUN SERPL-MCNC: 14 MG/DL (ref 7–25)
CALCIUM SERPL-MCNC: 10.1 MG/DL (ref 8.6–10.3)
CHLORIDE BLD-SCNC: 105 MMOL/L (ref 98–107)
CO2 SERPL-SCNC: 26 MMOL/L (ref 21–31)
CREAT SERPL-MCNC: 0.71 MG/DL (ref 0.6–1.2)
EOSINOPHIL # BLD AUTO: 0.2 10E3/UL (ref 0–0.7)
EOSINOPHIL NFR BLD AUTO: 2 %
ERYTHROCYTE [DISTWIDTH] IN BLOOD BY AUTOMATED COUNT: 14.9 % (ref 10–15)
GFR SERPL CREATININE-BSD FRML MDRD: 88 ML/MIN/1.73M2
GLUCOSE BLD-MCNC: 131 MG/DL (ref 70–105)
HCT VFR BLD AUTO: 38.8 % (ref 35–47)
HGB BLD-MCNC: 12.9 G/DL (ref 11.7–15.7)
IMM GRANULOCYTES # BLD: 0.1 10E3/UL
IMM GRANULOCYTES NFR BLD: 1 %
INR PPP: 0.98 (ref 0.85–1.15)
LYMPHOCYTES # BLD AUTO: 3 10E3/UL (ref 0.8–5.3)
LYMPHOCYTES NFR BLD AUTO: 32 %
MCH RBC QN AUTO: 30.6 PG (ref 26.5–33)
MCHC RBC AUTO-ENTMCNC: 33.2 G/DL (ref 31.5–36.5)
MCV RBC AUTO: 92 FL (ref 78–100)
MONOCYTES # BLD AUTO: 0.9 10E3/UL (ref 0–1.3)
MONOCYTES NFR BLD AUTO: 9 %
NEUTROPHILS # BLD AUTO: 5.3 10E3/UL (ref 1.6–8.3)
NEUTROPHILS NFR BLD AUTO: 55 %
NRBC # BLD AUTO: 0 10E3/UL
NRBC BLD AUTO-RTO: 0 /100
PLATELET # BLD AUTO: 252 10E3/UL (ref 150–450)
POTASSIUM BLD-SCNC: 3.7 MMOL/L (ref 3.5–5.1)
RBC # BLD AUTO: 4.22 10E6/UL (ref 3.8–5.2)
SARS-COV-2 RNA RESP QL NAA+PROBE: NEGATIVE
SODIUM SERPL-SCNC: 140 MMOL/L (ref 134–144)
TROPONIN I SERPL-MCNC: 3 PG/ML (ref 0–34)
WBC # BLD AUTO: 9.5 10E3/UL (ref 4–11)

## 2021-10-17 PROCEDURE — 84484 ASSAY OF TROPONIN QUANT: CPT | Performed by: FAMILY MEDICINE

## 2021-10-17 PROCEDURE — 70496 CT ANGIOGRAPHY HEAD: CPT

## 2021-10-17 PROCEDURE — 250N000013 HC RX MED GY IP 250 OP 250 PS 637: Performed by: FAMILY MEDICINE

## 2021-10-17 PROCEDURE — 71045 X-RAY EXAM CHEST 1 VIEW: CPT | Mod: TC

## 2021-10-17 PROCEDURE — 250N000011 HC RX IP 250 OP 636: Performed by: FAMILY MEDICINE

## 2021-10-17 PROCEDURE — C9803 HOPD COVID-19 SPEC COLLECT: HCPCS | Performed by: FAMILY MEDICINE

## 2021-10-17 PROCEDURE — 80048 BASIC METABOLIC PNL TOTAL CA: CPT | Performed by: FAMILY MEDICINE

## 2021-10-17 PROCEDURE — U0005 INFEC AGEN DETEC AMPLI PROBE: HCPCS | Performed by: FAMILY MEDICINE

## 2021-10-17 PROCEDURE — 85025 COMPLETE CBC W/AUTO DIFF WBC: CPT | Performed by: FAMILY MEDICINE

## 2021-10-17 PROCEDURE — 99223 1ST HOSP IP/OBS HIGH 75: CPT | Mod: AI | Performed by: INTERNAL MEDICINE

## 2021-10-17 PROCEDURE — 93005 ELECTROCARDIOGRAM TRACING: CPT | Performed by: FAMILY MEDICINE

## 2021-10-17 PROCEDURE — 99285 EMERGENCY DEPT VISIT HI MDM: CPT | Mod: 25 | Performed by: FAMILY MEDICINE

## 2021-10-17 PROCEDURE — 93010 ELECTROCARDIOGRAM REPORT: CPT | Performed by: INTERNAL MEDICINE

## 2021-10-17 PROCEDURE — 120N000001 HC R&B MED SURG/OB

## 2021-10-17 PROCEDURE — 250N000013 HC RX MED GY IP 250 OP 250 PS 637: Performed by: INTERNAL MEDICINE

## 2021-10-17 PROCEDURE — 36415 COLL VENOUS BLD VENIPUNCTURE: CPT | Performed by: FAMILY MEDICINE

## 2021-10-17 PROCEDURE — 99285 EMERGENCY DEPT VISIT HI MDM: CPT | Performed by: FAMILY MEDICINE

## 2021-10-17 PROCEDURE — 85610 PROTHROMBIN TIME: CPT | Performed by: FAMILY MEDICINE

## 2021-10-17 PROCEDURE — 70450 CT HEAD/BRAIN W/O DYE: CPT

## 2021-10-17 PROCEDURE — 85730 THROMBOPLASTIN TIME PARTIAL: CPT | Performed by: FAMILY MEDICINE

## 2021-10-17 RX ORDER — DEXTROSE MONOHYDRATE 25 G/50ML
25-50 INJECTION, SOLUTION INTRAVENOUS
Status: DISCONTINUED | OUTPATIENT
Start: 2021-10-17 | End: 2021-10-18 | Stop reason: HOSPADM

## 2021-10-17 RX ORDER — CLOPIDOGREL BISULFATE 75 MG/1
300 TABLET ORAL ONCE
Status: COMPLETED | OUTPATIENT
Start: 2021-10-17 | End: 2021-10-17

## 2021-10-17 RX ORDER — PROCHLORPERAZINE MALEATE 5 MG
5 TABLET ORAL EVERY 6 HOURS PRN
Status: DISCONTINUED | OUTPATIENT
Start: 2021-10-17 | End: 2021-10-18 | Stop reason: HOSPADM

## 2021-10-17 RX ORDER — ATENOLOL 50 MG/1
100 TABLET ORAL 2 TIMES DAILY
Status: DISCONTINUED | OUTPATIENT
Start: 2021-10-17 | End: 2021-10-17

## 2021-10-17 RX ORDER — NICOTINE POLACRILEX 4 MG
15-30 LOZENGE BUCCAL
Status: DISCONTINUED | OUTPATIENT
Start: 2021-10-17 | End: 2021-10-18 | Stop reason: HOSPADM

## 2021-10-17 RX ORDER — LIDOCAINE 40 MG/G
CREAM TOPICAL
Status: DISCONTINUED | OUTPATIENT
Start: 2021-10-17 | End: 2021-10-18 | Stop reason: HOSPADM

## 2021-10-17 RX ORDER — ATENOLOL 50 MG/1
100 TABLET ORAL 2 TIMES DAILY
Status: DISCONTINUED | OUTPATIENT
Start: 2021-10-18 | End: 2021-10-18 | Stop reason: HOSPADM

## 2021-10-17 RX ORDER — AMLODIPINE BESYLATE 5 MG/1
5 TABLET ORAL DAILY
Status: DISCONTINUED | OUTPATIENT
Start: 2021-10-19 | End: 2021-10-18 | Stop reason: HOSPADM

## 2021-10-17 RX ORDER — GABAPENTIN 600 MG/1
600 TABLET ORAL EVERY MORNING
Status: DISCONTINUED | OUTPATIENT
Start: 2021-10-18 | End: 2021-10-18 | Stop reason: HOSPADM

## 2021-10-17 RX ORDER — GABAPENTIN 600 MG/1
1800 TABLET ORAL AT BEDTIME
Status: DISCONTINUED | OUTPATIENT
Start: 2021-10-17 | End: 2021-10-18 | Stop reason: HOSPADM

## 2021-10-17 RX ORDER — ACETAMINOPHEN 325 MG/1
650 TABLET ORAL EVERY 4 HOURS PRN
Status: DISCONTINUED | OUTPATIENT
Start: 2021-10-17 | End: 2021-10-18 | Stop reason: HOSPADM

## 2021-10-17 RX ORDER — ONDANSETRON 4 MG/1
4 TABLET, ORALLY DISINTEGRATING ORAL EVERY 6 HOURS PRN
Status: DISCONTINUED | OUTPATIENT
Start: 2021-10-17 | End: 2021-10-18 | Stop reason: HOSPADM

## 2021-10-17 RX ORDER — LISINOPRIL AND HYDROCHLOROTHIAZIDE 20; 25 MG/1; MG/1
1 TABLET ORAL DAILY
Status: DISCONTINUED | OUTPATIENT
Start: 2021-10-18 | End: 2021-10-18 | Stop reason: CLARIF

## 2021-10-17 RX ORDER — CLOPIDOGREL BISULFATE 75 MG/1
75 TABLET ORAL DAILY
Status: DISCONTINUED | OUTPATIENT
Start: 2021-10-18 | End: 2021-10-18 | Stop reason: HOSPADM

## 2021-10-17 RX ORDER — ASPIRIN 81 MG/1
81 TABLET, CHEWABLE ORAL DAILY
Status: DISCONTINUED | OUTPATIENT
Start: 2021-10-18 | End: 2021-10-18 | Stop reason: HOSPADM

## 2021-10-17 RX ORDER — LEVOTHYROXINE SODIUM 150 UG/1
150 TABLET ORAL
Status: DISCONTINUED | OUTPATIENT
Start: 2021-10-18 | End: 2021-10-18 | Stop reason: HOSPADM

## 2021-10-17 RX ORDER — IOPAMIDOL 755 MG/ML
100 INJECTION, SOLUTION INTRAVASCULAR ONCE
Status: COMPLETED | OUTPATIENT
Start: 2021-10-17 | End: 2021-10-17

## 2021-10-17 RX ORDER — ASPIRIN 81 MG/1
81 TABLET ORAL DAILY
Status: DISCONTINUED | OUTPATIENT
Start: 2021-10-18 | End: 2021-10-18 | Stop reason: HOSPADM

## 2021-10-17 RX ORDER — PROCHLORPERAZINE 25 MG
12.5 SUPPOSITORY, RECTAL RECTAL EVERY 12 HOURS PRN
Status: DISCONTINUED | OUTPATIENT
Start: 2021-10-17 | End: 2021-10-18 | Stop reason: HOSPADM

## 2021-10-17 RX ORDER — ASPIRIN 81 MG/1
324 TABLET, CHEWABLE ORAL ONCE
Status: COMPLETED | OUTPATIENT
Start: 2021-10-17 | End: 2021-10-17

## 2021-10-17 RX ORDER — ROSUVASTATIN CALCIUM 5 MG/1
20 TABLET, COATED ORAL DAILY
Status: DISCONTINUED | OUTPATIENT
Start: 2021-10-17 | End: 2021-10-18 | Stop reason: HOSPADM

## 2021-10-17 RX ORDER — NICOTINE 21 MG/24HR
1 PATCH, TRANSDERMAL 24 HOURS TRANSDERMAL DAILY PRN
Status: DISCONTINUED | OUTPATIENT
Start: 2021-10-17 | End: 2021-10-18 | Stop reason: HOSPADM

## 2021-10-17 RX ORDER — ONDANSETRON 2 MG/ML
4 INJECTION INTRAMUSCULAR; INTRAVENOUS EVERY 6 HOURS PRN
Status: DISCONTINUED | OUTPATIENT
Start: 2021-10-17 | End: 2021-10-18 | Stop reason: HOSPADM

## 2021-10-17 RX ADMIN — NICOTINE 1 PATCH: 21 PATCH, EXTENDED RELEASE TRANSDERMAL at 21:00

## 2021-10-17 RX ADMIN — IOPAMIDOL 100 ML: 755 INJECTION, SOLUTION INTRAVENOUS at 17:43

## 2021-10-17 RX ADMIN — CLOPIDOGREL BISULFATE 300 MG: 75 TABLET, FILM COATED ORAL at 18:58

## 2021-10-17 RX ADMIN — ASPIRIN 81 MG CHEWABLE TABLET 324 MG: 81 TABLET CHEWABLE at 18:58

## 2021-10-17 RX ADMIN — GABAPENTIN 1800 MG: 600 TABLET, FILM COATED ORAL at 21:00

## 2021-10-17 ASSESSMENT — ENCOUNTER SYMPTOMS
DYSURIA: 0
NUMBNESS: 0
BACK PAIN: 0
CONFUSION: 0
HEADACHES: 0
DIARRHEA: 0
DIFFICULTY URINATING: 0
ABDOMINAL PAIN: 0
NAUSEA: 0
COUGH: 0
FACIAL ASYMMETRY: 1
FEVER: 0
SHORTNESS OF BREATH: 0
SPEECH DIFFICULTY: 0
WEAKNESS: 1
PALPITATIONS: 0
COLOR CHANGE: 0
TROUBLE SWALLOWING: 0
VOMITING: 0
SORE THROAT: 0

## 2021-10-17 ASSESSMENT — VISUAL ACUITY: OU: 1

## 2021-10-17 NOTE — ED TRIAGE NOTES
ED Nursing Triage Note (General)   ________________________________    Narda KHLOE Bennett is a 66 year old Female that presents to triage ambulance from home with spouse. Family tried to get patient to come in yesterday but patient refused. Patient comes to ER today with c/o left-sided leg weakness, left facial droop, and left droopy eye. LKW 2 nights ago. BG on scene 179.   BP (!) 175/79   Pulse 81   Temp 98.1  F (36.7  C) (Tympanic)   Resp 20   Wt 85.6 kg (188 lb 11.2 oz)   LMP  (LMP Unknown)   SpO2 98%   BMI 31.40 kg/m  t  Patient appears alert , in no acute distress., and cooperative and calm behavior.    GCS Total = 15  Airway: intact  Breathing noted as Normal.  Circulation Normal  Skin normal  Action taken:  Triage to critical care immediately      PRE HOSPITAL PRIOR LIVING SITUATION Spouse
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
## 2021-10-18 ENCOUNTER — APPOINTMENT (OUTPATIENT)
Dept: PHYSICAL THERAPY | Facility: OTHER | Age: 66
DRG: 065 | End: 2021-10-18
Attending: INTERNAL MEDICINE
Payer: MEDICARE

## 2021-10-18 ENCOUNTER — APPOINTMENT (OUTPATIENT)
Dept: CARDIOLOGY | Facility: OTHER | Age: 66
DRG: 065 | End: 2021-10-18
Attending: INTERNAL MEDICINE
Payer: MEDICARE

## 2021-10-18 ENCOUNTER — APPOINTMENT (OUTPATIENT)
Dept: OCCUPATIONAL THERAPY | Facility: OTHER | Age: 66
DRG: 065 | End: 2021-10-18
Attending: INTERNAL MEDICINE
Payer: MEDICARE

## 2021-10-18 ENCOUNTER — APPOINTMENT (OUTPATIENT)
Dept: MRI IMAGING | Facility: OTHER | Age: 66
DRG: 065 | End: 2021-10-18
Attending: INTERNAL MEDICINE
Payer: MEDICARE

## 2021-10-18 VITALS
TEMPERATURE: 97.6 F | OXYGEN SATURATION: 100 % | SYSTOLIC BLOOD PRESSURE: 173 MMHG | HEART RATE: 73 BPM | BODY MASS INDEX: 31.02 KG/M2 | WEIGHT: 186.4 LBS | RESPIRATION RATE: 18 BRPM | DIASTOLIC BLOOD PRESSURE: 65 MMHG

## 2021-10-18 LAB
ANION GAP SERPL CALCULATED.3IONS-SCNC: 11 MMOL/L (ref 3–14)
ATRIAL RATE - MUSE: 64 BPM
BUN SERPL-MCNC: 12 MG/DL (ref 7–25)
CALCIUM SERPL-MCNC: 10 MG/DL (ref 8.6–10.3)
CHLORIDE BLD-SCNC: 107 MMOL/L (ref 98–107)
CHOLEST SERPL-MCNC: 125 MG/DL
CO2 SERPL-SCNC: 25 MMOL/L (ref 21–31)
CREAT SERPL-MCNC: 0.69 MG/DL (ref 0.6–1.2)
DIASTOLIC BLOOD PRESSURE - MUSE: NORMAL MMHG
ERYTHROCYTE [DISTWIDTH] IN BLOOD BY AUTOMATED COUNT: 14.8 % (ref 10–15)
GFR SERPL CREATININE-BSD FRML MDRD: 90 ML/MIN/1.73M2
GLUCOSE BLD-MCNC: 152 MG/DL (ref 70–105)
HBA1C MFR BLD: 7.6 % (ref 4–6.2)
HCT VFR BLD AUTO: 40.8 % (ref 35–47)
HDLC SERPL-MCNC: 42 MG/DL (ref 23–92)
HGB BLD-MCNC: 13.2 G/DL (ref 11.7–15.7)
INTERPRETATION ECG - MUSE: NORMAL
LDLC SERPL CALC-MCNC: 41 MG/DL
LVEF ECHO: NORMAL
MAGNESIUM SERPL-MCNC: 1.6 MG/DL (ref 1.9–2.7)
MCH RBC QN AUTO: 29.5 PG (ref 26.5–33)
MCHC RBC AUTO-ENTMCNC: 32.4 G/DL (ref 31.5–36.5)
MCV RBC AUTO: 91 FL (ref 78–100)
NONHDLC SERPL-MCNC: 83 MG/DL
P AXIS - MUSE: 44 DEGREES
PLATELET # BLD AUTO: 272 10E3/UL (ref 150–450)
POTASSIUM BLD-SCNC: 3.7 MMOL/L (ref 3.5–5.1)
PR INTERVAL - MUSE: 152 MS
QRS DURATION - MUSE: 78 MS
QT - MUSE: 408 MS
QTC - MUSE: 420 MS
R AXIS - MUSE: -9 DEGREES
RBC # BLD AUTO: 4.47 10E6/UL (ref 3.8–5.2)
SODIUM SERPL-SCNC: 143 MMOL/L (ref 134–144)
SYSTOLIC BLOOD PRESSURE - MUSE: NORMAL MMHG
T AXIS - MUSE: 64 DEGREES
TRIGL SERPL-MCNC: 209 MG/DL
TROPONIN I SERPL-MCNC: 4.1 PG/ML (ref 0–34)
VENTRICULAR RATE- MUSE: 64 BPM
WBC # BLD AUTO: 8.2 10E3/UL (ref 4–11)

## 2021-10-18 PROCEDURE — 84484 ASSAY OF TROPONIN QUANT: CPT | Performed by: INTERNAL MEDICINE

## 2021-10-18 PROCEDURE — 36415 COLL VENOUS BLD VENIPUNCTURE: CPT | Performed by: INTERNAL MEDICINE

## 2021-10-18 PROCEDURE — 97530 THERAPEUTIC ACTIVITIES: CPT | Mod: GP

## 2021-10-18 PROCEDURE — 97165 OT EVAL LOW COMPLEX 30 MIN: CPT | Mod: GO | Performed by: OCCUPATIONAL THERAPIST

## 2021-10-18 PROCEDURE — 97530 THERAPEUTIC ACTIVITIES: CPT | Mod: GO | Performed by: OCCUPATIONAL THERAPIST

## 2021-10-18 PROCEDURE — 70553 MRI BRAIN STEM W/O & W/DYE: CPT

## 2021-10-18 PROCEDURE — 250N000013 HC RX MED GY IP 250 OP 250 PS 637: Performed by: FAMILY MEDICINE

## 2021-10-18 PROCEDURE — 83735 ASSAY OF MAGNESIUM: CPT | Performed by: INTERNAL MEDICINE

## 2021-10-18 PROCEDURE — 85027 COMPLETE CBC AUTOMATED: CPT | Performed by: INTERNAL MEDICINE

## 2021-10-18 PROCEDURE — 97161 PT EVAL LOW COMPLEX 20 MIN: CPT | Mod: GP

## 2021-10-18 PROCEDURE — 80061 LIPID PANEL: CPT | Performed by: INTERNAL MEDICINE

## 2021-10-18 PROCEDURE — 99232 SBSQ HOSP IP/OBS MODERATE 35: CPT | Performed by: FAMILY MEDICINE

## 2021-10-18 PROCEDURE — 97116 GAIT TRAINING THERAPY: CPT | Mod: GP

## 2021-10-18 PROCEDURE — 93306 TTE W/DOPPLER COMPLETE: CPT

## 2021-10-18 PROCEDURE — 250N000013 HC RX MED GY IP 250 OP 250 PS 637: Mod: GY | Performed by: STUDENT IN AN ORGANIZED HEALTH CARE EDUCATION/TRAINING PROGRAM

## 2021-10-18 PROCEDURE — 93306 TTE W/DOPPLER COMPLETE: CPT | Mod: 26 | Performed by: INTERNAL MEDICINE

## 2021-10-18 PROCEDURE — G0426 INPT/ED TELECONSULT50: HCPCS | Mod: G0 | Performed by: NURSE PRACTITIONER

## 2021-10-18 PROCEDURE — 83036 HEMOGLOBIN GLYCOSYLATED A1C: CPT | Performed by: INTERNAL MEDICINE

## 2021-10-18 PROCEDURE — 255N000002 HC RX 255 OP 636: Performed by: FAMILY MEDICINE

## 2021-10-18 PROCEDURE — 80048 BASIC METABOLIC PNL TOTAL CA: CPT | Performed by: INTERNAL MEDICINE

## 2021-10-18 PROCEDURE — 250N000013 HC RX MED GY IP 250 OP 250 PS 637: Performed by: INTERNAL MEDICINE

## 2021-10-18 PROCEDURE — 250N000011 HC RX IP 250 OP 636: Performed by: FAMILY MEDICINE

## 2021-10-18 PROCEDURE — A9575 INJ GADOTERATE MEGLUMI 0.1ML: HCPCS | Performed by: FAMILY MEDICINE

## 2021-10-18 RX ORDER — HYDRALAZINE HYDROCHLORIDE 20 MG/ML
10 INJECTION INTRAMUSCULAR; INTRAVENOUS EVERY 4 HOURS PRN
Status: DISCONTINUED | OUTPATIENT
Start: 2021-10-18 | End: 2021-10-18 | Stop reason: HOSPADM

## 2021-10-18 RX ORDER — CLOPIDOGREL BISULFATE 75 MG/1
75 TABLET ORAL DAILY
Qty: 21 TABLET | Refills: 0 | Status: SHIPPED | OUTPATIENT
Start: 2021-10-18 | End: 2021-11-08

## 2021-10-18 RX ORDER — ASPIRIN 325 MG
325 TABLET, DELAYED RELEASE (ENTERIC COATED) ORAL DAILY
Qty: 90 TABLET | Refills: 0 | Status: SHIPPED | OUTPATIENT
Start: 2021-11-08

## 2021-10-18 RX ORDER — AMLODIPINE BESYLATE 5 MG/1
5 TABLET ORAL ONCE
Status: COMPLETED | OUTPATIENT
Start: 2021-10-18 | End: 2021-10-18

## 2021-10-18 RX ORDER — NICOTINE 21 MG/24HR
1 PATCH, TRANSDERMAL 24 HOURS TRANSDERMAL EVERY 24 HOURS
Qty: 21 PATCH | Refills: 0 | Status: SHIPPED | OUTPATIENT
Start: 2021-10-18

## 2021-10-18 RX ORDER — PIOGLITAZONEHYDROCHLORIDE 15 MG/1
15 TABLET ORAL EVERY EVENING
Status: DISCONTINUED | OUTPATIENT
Start: 2021-10-18 | End: 2021-10-18 | Stop reason: HOSPADM

## 2021-10-18 RX ORDER — ASPIRIN 81 MG/1
81 TABLET, CHEWABLE ORAL DAILY
Qty: 21 TABLET | Refills: 0 | Status: SHIPPED | OUTPATIENT
Start: 2021-10-18 | End: 2021-11-08

## 2021-10-18 RX ORDER — LISINOPRIL 20 MG/1
20 TABLET ORAL DAILY
Status: DISCONTINUED | OUTPATIENT
Start: 2021-10-18 | End: 2021-10-18 | Stop reason: HOSPADM

## 2021-10-18 RX ORDER — LISINOPRIL 20 MG/1
20 TABLET ORAL DAILY
COMMUNITY

## 2021-10-18 RX ORDER — HYDROCHLOROTHIAZIDE 25 MG/1
25 TABLET ORAL DAILY
Status: DISCONTINUED | OUTPATIENT
Start: 2021-10-18 | End: 2021-10-18 | Stop reason: HOSPADM

## 2021-10-18 RX ORDER — GLIPIZIDE 5 MG/1
5 TABLET ORAL
Status: DISCONTINUED | OUTPATIENT
Start: 2021-10-18 | End: 2021-10-18 | Stop reason: HOSPADM

## 2021-10-18 RX ORDER — ROSUVASTATIN CALCIUM 5 MG/1
20 TABLET, COATED ORAL ONCE
Status: COMPLETED | OUTPATIENT
Start: 2021-10-18 | End: 2021-10-18

## 2021-10-18 RX ORDER — PIOGLITAZONEHYDROCHLORIDE 15 MG/1
30 TABLET ORAL EVERY MORNING
Status: DISCONTINUED | OUTPATIENT
Start: 2021-10-19 | End: 2021-10-18 | Stop reason: HOSPADM

## 2021-10-18 RX ADMIN — ROSUVASTATIN CALCIUM 20 MG: 5 TABLET, FILM COATED ORAL at 12:44

## 2021-10-18 RX ADMIN — CLOPIDOGREL BISULFATE 75 MG: 75 TABLET, FILM COATED ORAL at 11:16

## 2021-10-18 RX ADMIN — GADOTERATE MEGLUMINE 17 ML: 376.9 INJECTION INTRAVENOUS at 09:17

## 2021-10-18 RX ADMIN — HYDROCHLOROTHIAZIDE 25 MG: 25 TABLET ORAL at 11:15

## 2021-10-18 RX ADMIN — AMLODIPINE BESYLATE 5 MG: 5 TABLET ORAL at 05:03

## 2021-10-18 RX ADMIN — GABAPENTIN 600 MG: 600 TABLET, FILM COATED ORAL at 08:35

## 2021-10-18 RX ADMIN — LISINOPRIL 20 MG: 20 TABLET ORAL at 11:16

## 2021-10-18 RX ADMIN — LEVOTHYROXINE SODIUM 150 MCG: 0.15 TABLET ORAL at 08:35

## 2021-10-18 RX ADMIN — ATENOLOL 100 MG: 50 TABLET ORAL at 11:15

## 2021-10-18 RX ADMIN — ASPIRIN 81 MG: 81 TABLET, COATED ORAL at 11:15

## 2021-10-18 RX ADMIN — HYDRALAZINE HYDROCHLORIDE 10 MG: 20 INJECTION INTRAMUSCULAR; INTRAVENOUS at 08:35

## 2021-10-18 ASSESSMENT — ACTIVITIES OF DAILY LIVING (ADL)
ADLS_ACUITY_SCORE: 11
ADLS_ACUITY_SCORE: 11
ADLS_ACUITY_SCORE: 3
ADLS_ACUITY_SCORE: 11
ADLS_ACUITY_SCORE: 9
ADLS_ACUITY_SCORE: 11
ADLS_ACUITY_SCORE: 3
ADLS_ACUITY_SCORE: 11

## 2021-10-18 NOTE — PROGRESS NOTES
10/18/21 1449   Quick Adds   Type of Visit Initial Occupational Therapy Evaluation   Living Environment   People in home spouse   Current Living Arrangements house   Home Accessibility no concerns   Transportation Anticipated family or friend will provide   Self-Care   Usual Activity Tolerance good   Current Activity Tolerance good   Equipment Currently Used at Home cane, straight   Disability/Function   Hearing Difficulty or Deaf no   Wear Glasses or Blind no   Cognitive Status Examination   Orientation Status orientation to person, place and time   Affect/Mental Status (Cognitive) WFL   Follows Commands WFL   Visual Perception   Visual Impairment/Limitations WFL   Pain Assessment   Patient Currently in Pain No   Range of Motion Comprehensive   General Range of Motion no range of motion deficits identified   Strength Comprehensive (MMT)   General Manual Muscle Testing (MMT) Assessment no strength deficits identified   Coordination   Upper Extremity Coordination No deficits were identified   Transfers   Transfers bed-chair transfer;sit-stand transfer   Transfer Skill: Bed to Chair/Chair to Bed   Bed-Chair [place] (Transfers) supervision   Sit-Stand Transfer   Sit-Stand [place] (Transfers) supervision   Clinical Impression   Criteria for Skilled Therapeutic Interventions Met (OT) no   OT Diagnosis CVA   Planned Therapy Interventions (OT)   (eval only )   Clinical Decision Making Complexity (OT) low complexity   Therapy Frequency (OT) 1x eval and treat   Predicted Duration of Therapy eval only    Risk & Benefits of therapy have been explained risks/benefits reviewed   OT Discharge Planning    OT Discharge Recommendation (DC Rec) Home with assist   OT Rationale for DC Rec pt has no further OT needs at this time    OT Brief overview of current status  Pt is up moving in room and hallway with SBA using SEC, no deficits noted upon eval, will follow up with primary MD    Total Evaluation Time (Minutes)   Total  Evaluation Time (Minutes) 15

## 2021-10-18 NOTE — PROGRESS NOTES
SAFETY CHECKLIST  ID Bands and Risk clasps correct and in place (DNR, Fall risk, Allergy, Latex, Limb):  Yes  All Lines Reconciled and labeled correctly: Yes  Whiteboard updated:Yes  Environmental interventions (bed/chair alarm on, call light, side rails, restraints, sitter....): Yes  Verify Tele #: [id number]    Eunice Olivares RN on 10/18/2021 at 7:17 AM

## 2021-10-18 NOTE — PLAN OF CARE
Pt is here for a CVA. Neuro's indicating weakness to LUE/LLE. Worked with PT/OT. Pt has mild aphasia at times with left facial droop. Unchanged throughout shift. Talked with tele-stroke and had a MRI this morning. Swallowing with no difficulty with good intake. Gait slightly unsteady with weakness to left side. Pt using cane to ambulate and is a SBA. Lung sounds clear. HRR stable. On telemetry being monitored. IV saline locked.  Plans to discharge home today. Eunice Olivares RN on 10/18/2021 at 4:00 PM    Temp: 97.6  F (36.4  C) Temp src: Tympanic BP: (!) 173/65 Pulse: 73   Resp: 18 SpO2: 100 % O2 Device: None (Room air)

## 2021-10-18 NOTE — H&P
[facility]    History and Physical  Hospitalist       Date of Admission:  10/17/2021    Assessment & Plan   Narda Bennett is a 66 year old female who presents with subacute CVA with left-sided facial droop and left-sided upper and lower extremity weakness.    Principal Problem:    Cerebrovascular accident (CVA), unspecified mechanism (H)    Assessment: Subacute with left-sided deficits.  CT CTA findings reviewed and carotid stenosis will possibly need to be intervened upon in a timely fashion pending MRI results in a.m.    Plan:   -MRI brain in a.m.  -Neurology order set placed   -appreciate stroke neurology consult in a.m.  -Continue aspirin 81 daily  -Continue Plavix 75 daily after loading ER  -TTE in a.m.  -Telemetry    Active Problems:    Diabetes mellitus, type 2 (H)    Assessment: Well controlled with last A1c of 6.9    Plan:   -Hold Metformin and Actos  -ISS AC at bedtime      Essential hypertension    Assessment: Stable and allow for permissive hypertension    Plan:   -New home regimen in a.m.      Hypercholesterolemia    Assessment: Previously well controlled on Crestor with LDL of 56    Plan:   -Repeat lipid panel in a.m.  -Restart home Crestor as this has been stopped previously    Tobacco abuse  Assessment: Ongoing smoker and counseled to quit  Plan:  - nicotine patch as needed      Lung nodule    Assessment: Incidentally noted on CTA of neck    Plan:   - Follow-up with dedicated imaging as an outpatient for surveillance-     DVT Prophylaxis: Pneumatic Compression Devices  Code Status: No Order    Dax Titus    Primary Care Physician   Romaine Schwartz    Chief Complaint   Stroke    History is obtained from the patient and chart review.    History of Present Illness   Narda Bennett is a 66 year old female who presents with subacute stroke.  Patient developed worsening left-sided facial droop and left upper and lower extremity weakness with dragging her foot about 48 hours ago.   Family wanted her to come to the ER but she has been refusing and finally today they called EMS and she was brought to the ER.  There she underwent initial stroke eval and after some negotiation was subsequently placed on observation overnight for further management.    Past Medical History    I have reviewed this patient's medical history and updated it with pertinent information if needed.   Past Medical History:   Diagnosis Date     Atopic dermatitis 08/20/2013     Autoimmune thyroiditis     Hashimotos     Benign paroxysmal vertigo 03/21/2013     Calculus of kidney 01/22/2011     Closed fracture of neck of femur (H) 05/07/2015    after a fall     Disorder of cartilage      Essential hypertension 10/20/2015     Hearing loss      Lateral epicondylitis 7/25/2006    Overview:  IMO Update 10/11     Other intervertebral disc degeneration, lumbosacral region     No Comments Provided     Other specified symptoms and signs involving the circulatory and respiratory systems 08/02/2012     Peripheral vascular disease (H) 08/02/2012    with claudication     Polyp of colon     with low grade colon polyp at the hepatic flexure and at 22 cm.; sessile serrated adenoma with low grade dysplasia colon polyp at the splenic flexure.     Pregnancy     G4, P3, A questionable 1, and 1 stillborn of a twin     Pure hypercholesterolemia      Shoulder impingement, right 09/15/2010     Tobacco use      Type 2 diabetes mellitus without complications (H)        Past Surgical History   I have reviewed this patient's surgical history and updated it with pertinent information if needed.  Past Surgical History:   Procedure Laterality Date     ARTHROPLASTY HIP Left 04/2015    fall--fracture     ARTHROSCOPY KNEE Left 01/2016    lateral meniscus removal and patellar and trochlear chondroplasty     ARTHROSCOPY SHOULDER ROTATOR CUFF REPAIR Right      CHOLECYSTECTOMY       COLONOSCOPY  10/08/2012    Colonoscopy f/u 2017     ELBOW SURGERY Left 08/2009     Dr Amaya     EYE SURGERY      Previous eye surgery x 2, one time she had a fish hook in her eye     HYSTERECTOMY TOTAL ABDOMINAL, BILATERAL SALPINGO-OOPHORECTOMY, COMBINED  1989    benign reasons     LAPAROSCOPIC SUSPENSION BLADDER NECK      Dr Doan     LITHOTRIPSY  2011    Dr. Doan ([place]), three times       Prior to Admission Medications   Prior to Admission Medications   Prescriptions Last Dose Informant Patient Reported? Taking?   Blood Glucose Monitoring Suppl (TRUE METRIX METER) w/Device KIT   Yes No   Sig: USE TO TEST BLOOD SUGAR 4 TIMES DAILY OR AS DIRECTED.   Continuous Blood Gluc  (DEXCOM G6 ) EDDIE   No No   Si Device continuous prn (For continuous monitoring of BG.)   Patient not taking: Reported on 2020   Continuous Blood Gluc Sensor (DEXCOM G6 SENSOR) MISC   No No   Si each every 10 days 3 sensors per month   Patient not taking: Reported on 2020   Continuous Blood Gluc Transmit (DEXCOM G6 TRANSMITTER) MISC   No No   Si Device every 3 months   Patient not taking: Reported on 2020   Misc. Devices (ROLLER WALKER) MISC   Yes No   Si Wheeled Walker with Seat and Brakes for home use. For 52 weeks.   albuterol (PROAIR HFA/PROVENTIL HFA/VENTOLIN HFA) 108 (90 BASE) MCG/ACT Inhaler   Yes No   Sig: Inhale 2 puffs into the lungs every 4 hours as needed   amLODIPine (NORVASC) 5 MG tablet   No No   Sig: Take 1 tablet (5 mg) by mouth daily   aspirin (ASA) 81 MG tablet   Yes No   Sig: Take 1 tablet (81 mg) by mouth daily   atenolol (TENORMIN) 100 MG tablet   No No   Sig: Take 1 tablet (100 mg) by mouth 2 times daily   blood glucose monitoring (NO BRAND SPECIFIED) meter device kit   No No   Sig: Use to test blood sugar 4 times daily or as directed.   Patient not taking: Reported on 2020   fish oil-omega-3 fatty acids 1000 MG capsule   Yes No   Sig: Take 1 capsule by mouth daily   gabapentin (NEURONTIN) 600 MG tablet   No No   Sig: Take 2 tablets (1,200  mg) by mouth every morning AND 1 tablet (600 mg) daily AND 3 tablets (1,800 mg) every evening.   glipiZIDE-metFORMIN (METAGLIP) 5-500 MG tablet   No No   Sig: Take 2 tablets by mouth 2 times daily (before meals)   levothyroxine (SYNTHROID/LEVOTHROID) 150 MCG tablet   No No   Sig: Take 1 tablet (150 mcg) by mouth daily   lisinopril-hydrochlorothiazide (ZESTORETIC) 20-25 MG tablet   No No   Sig: Take 1 tablet by mouth daily   order for DME   No No   Sig: Equipment being ordered: walker   pioglitazone (ACTOS) 15 MG tablet   No No   Sig: TAKE 1 TABLET BY MOUTH EVERY EVENING IN addition TO 30 MG IN THE MORNING.   pioglitazone (ACTOS) 30 MG tablet   No No   Sig: TAKE 1 TABLET BY MOUTH EVERY MORNING IN addtion TO 15 MG TABLET in the evening   rosuvastatin (CRESTOR) 20 MG tablet   No No   Sig: Take 1 tablet (20 mg) by mouth daily      Facility-Administered Medications: None     Allergies   Allergies   Allergen Reactions     Oxycodone Itching and Nausea and Vomiting     Simvastatin GI Disturbance     Atorvastatin GI Disturbance     Bicillin C-R, Itching     Codeine Hives     Erythromycin Nausea and Vomiting     Lyrica [Pregabalin] Nausea and Vomiting and Itching     No Clinical Screening - See Comments Other (See Comments)     Oral contraceptives- didn't work     Pravastatin      Other reaction(s): Edema  Muscle aching and some swelling and edema snehal-orbitally.      Procaine Unknown     Delayed reaction- stays in system for long time     Sulfa Drugs      Other reaction(s): Edema       Social History   I have reviewed this patient's social history and updated it with pertinent information if needed. Narda Bennett  reports that she has been smoking cigarettes. She has been smoking about 0.00 packs per day for the past 40.00 years. She has never used smokeless tobacco. She reports that she does not drink alcohol and does not use drugs.    Family History   I have reviewed this patient's family history and updated it with  pertinent information if needed.   Family History   Problem Relation Age of Onset     Cancer Father         Cancer, lung cancer.     Coronary Artery Disease Father      Heart Disease Mother         Heart Disease,CAD     Other - See Comments Mother         Stroke/Alzheimer's     Diabetes Mother         Diabetes,type II     Breast Cancer Mother         Cancer-breast,metastatic breast cancer     Diabetes Brother         Diabetes, blind     Heart Disease Brother         Heart Disease, heart disease     Diabetes Brother         Diabetes,type II     Heart Disease Brother         Heart Disease,heart disease     Substance Abuse Brother         Drug Abuse,chemical dependency w/ drugs     Diabetes Brother         Diabetes, blind     Other - See Comments Brother         Psychiatric illness,Bipolar/Stroke, CVA     Heart Disease Brother         Heart Disease, MI and cardiac stents,     Multiple Sclerosis Daughter      No Known Problems Half-Sister        Review of Systems     Constitutional: normal energy and appetite, no recent sick contacts, no Covid related symptoms.  Eyes: no changes in vision  Ears, nose, mouth, throat, and face: no mouth sores, dysphagia, or odynophagia  Respiratory: no shortness of breath, cough, or wheezing. No aspiration symptoms.   Cardiovascular: no chest pain, palpitations, orthopnea, increased lower extremity edema, or syncope.   Gastrointestinal: no constipation, diarrhea, nausea, vomiting or abdominal pain.  Genitourinary: no dysuria, hematuria, urgency or frequency.   Hematologic/lymphatic: no unintentional weight loss or night sweats.  Musculoskeletal: no pain to extremities or falls.   Endocrine: blood sugars have been well controlled.  He adamantly refuses any insulin if needed for hyperglycemia.      Physical Exam   Temp: 98.1  F (36.7  C) Temp src: Tympanic BP: (!) 165/73 Pulse: 80   Resp: 16 SpO2: 99 % O2 Device: None (Room air)    Vital Signs with Ranges  Temp:  [98.1  F (36.7  C)] 98.1  F  (36.7  C)  Pulse:  [80-82] 80  Resp:  [16-20] 16  BP: (165-175)/(73-79) 165/73  SpO2:  [97 %-99 %] 99 %  188 lbs 11.2 oz    Exam:  GENERAL: Talkative, sitting up in bed, frustrated initially regarding not understanding what is going on, calm pleasant and appropriate over the course of our discussion regarding her clinical course, in no apparent distress.  Head: normocephalic and atraumatic  Eyes: anicteric and non-injected sclera  Nose: no rhinorrhea or epistaxis.   Throat: moist mucous membranes with no active oral lesions.  NECK: Supple, jugular venous distension not present.  CARDIOVASCULAR: regular rate and rhythm, no murmurs, rubs, or gallops. Normal S1/S2. No lower extremity edema.   RESPIRATORY: clear to auscultation bilaterally, no wheezes, no crackles.  No accessory muscle use or evidence of respiratory distress.   GI: soft, non-tender, non-distended, normoactive bowel sounds.  MUSCULOSKELETAL: warm and well perfused, 2+ dorsalis pedis pulses.    SKIN: no pallor, jaundice or rashes.  NEUROLOGY: AAOx3, follows commands, speech and language without focal deficits.  Left facial droop noted, dysarthric speech also not noted, left  strength 4, 5+ right/left dorsi plantarflexion 4 and 5+ on right.       Data   Data reviewed today:  I personally reviewed no images or EKG's today.  Recent Labs   Lab 10/17/21  1610   WBC 9.5   HGB 12.9   MCV 92      INR 0.98      POTASSIUM 3.7   CHLORIDE 105   CO2 26   BUN 14   CR 0.71   ANIONGAP 9   HAKEEM 10.1   *       Recent Results (from the past 24 hour(s))   XR Chest Port 1 View    Narrative    PROCEDURE INFORMATION:   Exam: XR Chest   Exam date and time: 10/17/2021 4:22 PM   Age: 66 years old   Clinical indication: Other: Stroke symptoms; Additional info: Baseline     TECHNIQUE:   Imaging protocol: XR of the chest.   Views: 1 view.     COMPARISON:   CR Chest 2/28/2020 11:08 AM     FINDINGS:   Lungs: Unremarkable. No consolidation.   Pleural spaces: No  pleural effusion. No pneumothorax.   Heart/Mediastinum:  No cardiomegaly. Calcified tortuous aorta.  Bones/joints: Unremarkable.       Impression    IMPRESSION:   No acute lung pathology.     THIS DOCUMENT HAS BEEN ELECTRONICALLY SIGNED BY LORIN VALVERDE MD   CT Head w/o Contrast    Narrative    PROCEDURE: CT HEAD W/O CONTRAST   10/17/2021 5:57 PM    HISTORY:Female, age,  66 years, , , Code Stroke to evaluate for  potential thrombolysis and thrombectomy.  PLEASE READ IMMEDIATELY.    COMPARISON:None    TECHNIQUE: CT of the brain without contrast.    FINDINGS: Ventricles and sulci are mildly enlarged consistent with  atrophy . Gray and white matter demonstrate scattered areas of  decreased density.    There is no evidence of mass, mass effect or midline shift. No  evidence of acute hemorrhage.    The bones are unremarkable. No fracture.       Impression    IMPRESSION:   No acute intracranial hemorrhage or mass.  No acute fracture.     Examination was discussed with Dr. Flores at 1809 hours. The  examination was opened for review at 1805 hours.    COOPER BALDWIN MD         SYSTEM ID:  [id number]   CTA Head Neck with Contrast    Narrative    PROCEDURE: CTA  HEAD NECK WITH CONTRAST   10/17/2021 5:58 PM    HISTORY:Female, age,  66 years, , , Code Stroke to evaluate for  potential thrombolysis and thrombectomy.  PLEASE READ IMMEDIATELY.    COMPARISON:    TECHNIQUE: CT scan was performed of the head and neck  before and  after the administration of intravenous contrast. Sagittal, coronal,  axial and 3-D reconstructed MIP  images were reviewed.    FINDINGS:   CTA neck: Scattered atherosclerotic calcifications throughout the  thoracic aorta. The innominate artery, subclavian arteries, common  carotid arteries and the vertebral arteries are patent. The left  vertebral artery is hypoplastic. Moderate volume of calcified plaque  seen within the carotid bifurcations with high-grade stenosis, greater  than 70% of the proximal  right ICA.    CTA of the head: Hypoplastic left vertebral artery. The vertebral  basilar system is patent. The posterior see large and branches are  patent. The petrous and cavernous portions of the left and right  internal carotid arteries are patent. The anterior and middle cerebral  arteries and respective branches are also patent.    CT scan neck: Muscles of mastication and salivary glands are normal.  Oral and pharyngeal mucosa is normal. The larynx and thyroid gland are  also normal. There is no evidence of pathologic lymph node  enlargement. There are scattered degenerative changes throughout the  cervical spine without evidence of acute abnormality. Visualized  portions of the chest demonstrate mild air trapping and there is a 16  mm dense, partially calcified nodule located posteriorly in the right  lung.    CT scan of the brain: Contrast enhanced CT scan of the brain  demonstrates no evidence of abnormal enhancement. Ventricles and sulci  are mildly enlarged consistent with mild atrophy. White matter changes  are also seen suggesting small vessel disease. Bony structures  demonstrate no acute abnormality.            Impression    IMPRESSION:   High-grade stenosis involving the proximal right ICA of greater than  70%.    No evidence of acute occlusion. No evidence of apparent aneurysm or  arteriovenous malformation.    1.6 cm dense nodule located posteriorly in the right lung, possibly  calcified. Recommend comparison with any previous CT scan of the  chest. Otherwise, dedicated CT scan of chest may be helpful in further  characterizing and evaluate for other nodules.    COOPER BALDWIN MD         SYSTEM ID:  [id number]

## 2021-10-18 NOTE — PROGRESS NOTES
Pt /86. MD Nunez notified. PRN Hydralazine ordered and given.   Pt now off the floor for MRI. Eunice Olivares RN on 10/18/2021 at 8:57 AM    Pt currently talking with telestroke. Will reattach heart monitor and recheck BP when call is done. Eunice Olivares RN on 10/18/2021 at 10:48 AM    Heart monitor back on. BP recheck at 167/63. Eunice Olivares RN on 10/18/2021 at 11:17 AM

## 2021-10-18 NOTE — ASSESSMENT & PLAN NOTE
Left sided facial droop with left sided weakness in left arm and leg  Has had sxs off and on for past few weeks  Family pushed her here yesterday, she did not want to be here  MRI today showing right paramedial katherine  Echo normal  Tele stroke consulted with patient  PT/OT/SLR saw patient and cleared patient to go home, no services recommended  Started on Plavix/ASA, will take ASA 81 mg for 21 days as well as Plavix 75 mg daily for 21 days, followed  mg indefinitely, restart on Crestor  Follow-up with PCP in  2 weeks, neurology in 6-8 weeks  Will restart crestor

## 2021-10-18 NOTE — PHARMACY-ADMISSION MEDICATION HISTORY
Pharmacy -- Admission Medication Reconciliation    Prior to admission (PTA) medications were reviewed and the patient's PTA medication list was updated.    Sources Consulted: sure scripts, chart review, [facility] medication/refill list, patient interview    The reliability of this Medication Reconciliation is: Reliability: Borderline reliable    The following significant changes were made:    Removed amlodipine    Removed lisinopril/hctz    Added lisinopril    Updated albuterol directions      **per patient, she tells me that she wants Dr Schwartz to be in charge while she is here and whatever [facility] list says, is what she is taking.  However, when trying to go through medications she said she hasn't been taking Crestor because Dr Sainz took her off of this.  This was filled recently on 8/17/21for #90.  I have updated her current medication list to reflect what [facility] pharmacy has been filling.  Patient tells me she took her morning medications yesterday.    In addition, the patient's allergies were reviewed with the patient and updated as follows:   Allergies: Oxycodone; Simvastatin; Atorvastatin; Bicillin c-r,; Codeine; Erythromycin; Lyrica [pregabalin]; No clinical screening - see comments; Pravastatin; Procaine; and Sulfa drugs    The pharmacist has reviewed with the patient that all personal medications should be removed from the building or locked in the belongings safe.  Patient shall only take medications ordered by the physician and administered by the nursing staff.       Medication barriers identified: patient upset with situation, does not want to discuss medications so unknown if any other barriers at home?   Medication adherence concerns: refill list up to date   Understanding of emergency medications: unknown, would not discuss individual medications with me    Pat Villanueva JESUS, 10/18/2021,  8:19 AM

## 2021-10-18 NOTE — PHARMACY - DISCHARGE MEDICATION RECONCILIATION AND EDUCATION
Pharmacy:  Discharge Counseling and Medication Reconciliation    Narda Bennett  [address]   [place] [id number] [phone] (home)   66 year old female  PCP: Romaine Schwartz    Allergies: Oxycodone; Simvastatin; Atorvastatin; Bicillin c-r,; Codeine; Erythromycin; Lyrica [pregabalin]; No clinical screening - see comments; Pravastatin; Procaine; and Sulfa drugs    Discharge Counseling:    Pharmacist met with patient (and/or family) today to review the medication portion of the After Visit Summary (with an emphasis on NEW medications) and to address patient's questions/concerns.    Summary of Education: met with patient to discuss administration, duration and side effects of new medications.  Went over directions of DAPT for 21 days, then increase to 325 mg aspirin.  Patient understood and no further questions.    Materials Provided:  MedCounselor sheets printed from Clinical Pharmacology on: nicotine patch, clopidogrel    Discharge Medication Reconciliation:    It has been determined that the patient has an adequate supply of medications available or which can be obtained from the patient's preferred pharmacy, which HE/SHE has confirmed as: [facility]  Thank you for the consult.    Pat Villanueva [facility]........October 18, 2021 4:31 PM

## 2021-10-18 NOTE — ASSESSMENT & PLAN NOTE
Taking glipizide/metformin and actos at home, hgbA1c at 7.6  Metformin on hold  Sliding scale coverage for now  Will plan to restart oral meds at discharge

## 2021-10-18 NOTE — PROGRESS NOTES
Discharge Note    Data:  Narda Bennett discharged to home at 1700 via wheel chair. Accompanied by significant other and staff.    Action:  Written discharge/follow-up instructions were provided to patient and spouse. Prescriptions sent to patients preferred pharmacy. All belongings sent with patient.  Education handouts given to patient on stroke to go home with.  Patient reminded to make appointment with PCP and to discuss neurology follow-up with PCP.    Response:  Patient verbalized understanding of discharge instructions, reason for discharge, and necessary follow-up appointments.    Eunice Olivares RN on 10/18/2021 at 5:00 PM

## 2021-10-18 NOTE — CONSULTS
[facility]    Stroke Consult Note    Reason for Consult:  stroke    Chief Complaint: Facial Droop and Fatigue       HPI  Narda Bennett is a 66 year old female with past medical history significant for L eye droop from periorbital injury at 5 yo, HTN, vertigo, PVD, and HLD, who presented to the [facility] ED 10/17/21 due to L facial droop, LLE weakness that had started 2 days prior (10/14/21). She initially declined further evaluation in the ED, but later agreed to CTH, which was negative for acute pathology. CTA head/neck showed 70% R ICA stenosis and incidental right lung nodule.     Today on exam she reports that she continues to drag her left foot with walking.     Stroke Evaluation Summarized    MRI/Head CT CTH with no acute intracranial hemorrhage or mass, No acute fracture. MRI brain with right pontine infarct   Intracranial Vasculature CTA head unremarkable   Cervical Vasculature High-grade stenosis involving the proximal right ICA >70%, 1.6 cm dense nodule located posteriorly in the right lung, possibly calcified     Echocardiogram EF 60-65%, normal LA, borderline concentric LVH   EKG/Telemetry NSR   Other Testing Not Applicable     LDL  10/18/2021: 41 mg/dL   A1C  10/18/2021: 7.6 %   Troponin 10/18/2021: 4.1 pg/mL       Impression  1. Acute ischemic stroke of right katherine due to small-vessel occlusion   2. Asymptomatic R ICA stenosis (>70%). This did not contribute to her recent stroke.    Recommendations  - DAPT with ASA 81 mg + Plavix 75 mg for 21 days, then  mg alone indefinitely   - LDL 41, continue Crestor 20 mg daily with goal LDL 40-70. LDL <40 assciated with increased risk of ICH.   - A1c 7.6, tighter long term glucose control needed to achieve goal <7.0  - Goal BP <130/80 with tighter control associated with decreased overall CV risk, if tolerated  - PT/OT/SLP  - Discharge with 30 day cardiac event monitor to evaluate for atrial fibrillation  - Stroke education  -  "Encouraged smoking cessation    Patient Follow-up    - in 6-8 weeks with [facility] or other local neurologist of patient's choice   - with PCP in 1-2 weeks    Thank you for this consult. No further stroke evaluation is recommended, so we will sign off. Please contact us with any additional questions.    MARYANN Johnson, CNP  Neurology  To page me or covering stroke neurology team member, click here: [facility]COM   Choose \"On Call\" tab at top, then search dropdown box for \"Neurology Adult\", select location, press Enter, then look for stroke/neuro ICU/telestroke.    _____________________________________________________    Clinically Significant Risk Factors Present on Admission             # Platelet Defect: home medication list includes an antiplatelet medication        Past Medical History   Past Medical History:   Diagnosis Date     Atopic dermatitis 08/20/2013     Autoimmune thyroiditis     Hashimotos     Benign paroxysmal vertigo 03/21/2013     Calculus of kidney 01/22/2011     Closed fracture of neck of femur (H) 05/07/2015    after a fall     Disorder of cartilage      Essential hypertension 10/20/2015     Hearing loss      Lateral epicondylitis 7/25/2006    Overview:  IMO Update 10/11     Other intervertebral disc degeneration, lumbosacral region     No Comments Provided     Other specified symptoms and signs involving the circulatory and respiratory systems 08/02/2012     Peripheral vascular disease (H) 08/02/2012    with claudication     Polyp of colon     with low grade colon polyp at the hepatic flexure and at 22 cm.; sessile serrated adenoma with low grade dysplasia colon polyp at the splenic flexure.     Pregnancy     G4, P3, A questionable 1, and 1 stillborn of a twin     Pure hypercholesterolemia      Shoulder impingement, right 09/15/2010     Tobacco use      Type 2 diabetes mellitus without complications (H)      Past Surgical History   Past Surgical History:   Procedure Laterality " Date     ARTHROPLASTY HIP Left 04/2015    fall--fracture     ARTHROSCOPY KNEE Left 01/2016    lateral meniscus removal and patellar and trochlear chondroplasty     ARTHROSCOPY SHOULDER ROTATOR CUFF REPAIR Right      CHOLECYSTECTOMY       COLONOSCOPY  10/08/2012    Colonoscopy f/u 2017     ELBOW SURGERY Left 08/2009    Dr Amaya     EYE SURGERY      Previous eye surgery x 2, one time she had a fish hook in her eye     HYSTERECTOMY TOTAL ABDOMINAL, BILATERAL SALPINGO-OOPHORECTOMY, COMBINED  1989    benign reasons     LAPAROSCOPIC SUSPENSION BLADDER NECK      Dr Doan     LITHOTRIPSY  01/2011    Dr. Doan ([place]), three times     Medications   Home Meds  Prior to Admission medications    Medication Sig Start Date End Date Taking? Authorizing Provider   albuterol (PROAIR HFA/PROVENTIL HFA/VENTOLIN HFA) 108 (90 BASE) MCG/ACT Inhaler Inhale 2 puffs into the lungs every 4 hours as needed 1/5/17   Reported, Patient   amLODIPine (NORVASC) 5 MG tablet Take 1 tablet (5 mg) by mouth daily 8/10/20   Patsy Gordon, DO   aspirin (ASA) 81 MG tablet Take 1 tablet (81 mg) by mouth daily 5/24/19   Patsy Gordon, DO   atenolol (TENORMIN) 100 MG tablet Take 1 tablet (100 mg) by mouth 2 times daily 8/10/20   Patsy Gordon, DO   blood glucose monitoring (NO BRAND SPECIFIED) meter device kit Use to test blood sugar 4 times daily or as directed.  Patient not taking: Reported on 9/18/2020 5/20/19   Patsy Gordon DO   Blood Glucose Monitoring Suppl (TRUE METRIX METER) w/Device KIT USE TO TEST BLOOD SUGAR 4 TIMES DAILY OR AS DIRECTED. 5/20/19   Reported, Patient   Continuous Blood Gluc  (DEXCOM G6 ) EDDIE 1 Device continuous prn (For continuous monitoring of BG.)  Patient not taking: Reported on 9/18/2020 11/7/19   Patsy Gordon DO   Continuous Blood Gluc Sensor (DEXCOM G6 SENSOR) MISC 1 each every 10 days 3 sensors per month  Patient not taking: Reported on 9/18/2020 10/9/19   Patsy Gordon DO   Continuous Blood  Gluc Transmit (DEXCOM G6 TRANSMITTER) MISC 1 Device every 3 months  Patient not taking: Reported on 9/18/2020 10/9/19   Patsy Gordon DO   fish oil-omega-3 fatty acids 1000 MG capsule Take 1 capsule by mouth daily 5/12/15   Reported, Patient   gabapentin (NEURONTIN) 600 MG tablet Take 2 tablets (1,200 mg) by mouth every morning AND 1 tablet (600 mg) daily AND 3 tablets (1,800 mg) every evening. 10/12/20   Patsy Gordon DO   glipiZIDE-metFORMIN (METAGLIP) 5-500 MG tablet Take 2 tablets by mouth 2 times daily (before meals) 8/10/20   Patsy Gordon DO   levothyroxine (SYNTHROID/LEVOTHROID) 150 MCG tablet Take 1 tablet (150 mcg) by mouth daily 8/10/20   Patsy Gordon DO   lisinopril-hydrochlorothiazide (ZESTORETIC) 20-25 MG tablet Take 1 tablet by mouth daily 8/10/20   Patsy Gordon DO   Misc. Devices (ROLLER WALKER) MISC 4 Wheeled Walker with Seat and Brakes for home use. For 52 weeks. 4/24/15   Reported, Patient   order for DME Equipment being ordered: walker 4/1/19   Patsy Gordon DO   pioglitazone (ACTOS) 15 MG tablet TAKE 1 TABLET BY MOUTH EVERY EVENING IN addition TO 30 MG IN THE MORNING. 8/10/20   Patsy Gordon DO   pioglitazone (ACTOS) 30 MG tablet TAKE 1 TABLET BY MOUTH EVERY MORNING IN addtion TO 15 MG TABLET in the evening 8/10/20   Patsy Gordon DO   rosuvastatin (CRESTOR) 20 MG tablet Take 1 tablet (20 mg) by mouth daily 4/28/20   Сергей Calderon MD       Scheduled Meds    [START ON 10/19/2021] amLODIPine  5 mg Oral Daily     aspirin  81 mg Oral Daily    Or     aspirin  81 mg Oral or NG Tube Daily     atenolol  100 mg Oral BID     clopidogrel  75 mg Oral or NG Tube Daily     gabapentin  1,800 mg Oral At Bedtime     gabapentin  600 mg Oral QAM     hydrochlorothiazide  25 mg Oral Daily     insulin aspart  1-7 Units Subcutaneous TID AC     insulin aspart  1-5 Units Subcutaneous At Bedtime     levothyroxine  150 mcg Oral QAM AC     lisinopril  20 mg Oral Daily     nicotine   Transdermal Q8H      rosuvastatin  20 mg Oral Daily     sodium chloride (PF)  3 mL Intracatheter Q8H       Infusion Meds    - MEDICATION INSTRUCTIONS -       - MEDICATION INSTRUCTIONS -         PRN Meds  acetaminophen, glucose **OR** dextrose **OR** glucagon, lidocaine 4%, lidocaine (buffered or not buffered), - MEDICATION INSTRUCTIONS -, - MEDICATION INSTRUCTIONS -, nicotine, ondansetron **OR** ondansetron, prochlorperazine **OR** prochlorperazine **OR** prochlorperazine, sodium chloride (PF)    Allergies   Allergies   Allergen Reactions     Oxycodone Itching and Nausea and Vomiting     Simvastatin GI Disturbance     Atorvastatin GI Disturbance     Bicillin C-R, Itching     Codeine Hives     Erythromycin Nausea and Vomiting     Lyrica [Pregabalin] Nausea and Vomiting and Itching     No Clinical Screening - See Comments Other (See Comments)     Oral contraceptives- didn't work     Pravastatin      Other reaction(s): Edema  Muscle aching and some swelling and edema snehal-orbitally.      Procaine Unknown     Delayed reaction- stays in system for long time     Sulfa Drugs      Other reaction(s): Edema     Family History   Family History   Problem Relation Age of Onset     Cancer Father         Cancer, lung cancer.     Coronary Artery Disease Father      Heart Disease Mother         Heart Disease,CAD     Other - See Comments Mother         Stroke/Alzheimer's     Diabetes Mother         Diabetes,type II     Breast Cancer Mother         Cancer-breast,metastatic breast cancer     Diabetes Brother         Diabetes, blind     Heart Disease Brother         Heart Disease, heart disease     Diabetes Brother         Diabetes,type II     Heart Disease Brother         Heart Disease,heart disease     Substance Abuse Brother         Drug Abuse,chemical dependency w/ drugs     Diabetes Brother         Diabetes, blind     Other - See Comments Brother         Psychiatric illness,Bipolar/Stroke, CVA     Heart Disease Brother         Heart Disease, MI and  cardiac stents,     Multiple Sclerosis Daughter      No Known Problems Half-Sister      Social History   Social History     Tobacco Use     Smoking status: Current Some Day Smoker     Packs/day: 0.00     Years: 40.00     Pack years: 0.00     Types: Cigarettes     Smokeless tobacco: Never Used     Tobacco comment: Quit smoking: down to 1 or 2 daily   Substance Use Topics     Alcohol use: Never     Drug use: Never     Comment: Drug use: No       Review of Systems   The 10 point Review of Systems is negative other than noted in the HPI or here.        PHYSICAL EXAMINATION   Temp:  [96.9  F (36.1  C)-98.6  F (37  C)] 96.9  F (36.1  C)  Pulse:  [61-82] 65  Resp:  [16-20] 16  BP: (155-211)/() 185/82  SpO2:  [93 %-99 %] 93 %    Neuro Exam  Mental Status:  alert, oriented x 3, follows commands, speech clear and fluent, naming and repetition normal  Cranial Nerves:  visual fields intact (tested by nurse), EOMI with normal smooth pursuit, facial sensation intact and symmetric (tested by nurse), hearing not formally tested but intact to conversation, shoulder shrug equal bilaterally, tongue protrusion midline, mild dysarthria, left facial droop  Motor:  no abnormal movements, mild drift in FLORESITA/LLE, no drift in right hemibody  Reflexes:  unable to test (telestroke)  Sensory:  light touch sensation intact and symmetric throughout upper and lower extremities (assessed by nurse), no extinction on double simultaneous stimulation (assessed by nurse)  Coordination:  no ataxia out of proportion to weakness  Station/Gait:  unable to test due to telestroke    Dysphagia Screen  Per Nursing    Stroke Scales    NIHSS  Interval     Interval Comments     1a. Level of Consciousness 0-->Alert, keenly responsive   1b. LOC Questions 0-->Answers both questions correctly   1c. LOC Commands 0-->Performs both tasks correctly   2.   Best Gaze 0-->Normal   3.   Visual 0-->No visual loss   4.   Facial Palsy 1-->Minor paralysis (flattened  nasolabial fold, asymmetry on smiling)   5a. Motor Arm, Left 1-->Drift, limb holds 90 (or 45) degrees, but drifts down before full 10 seconds, does not hit bed or other support   5b. Motor Arm, Right 0-->No drift, limb holds 90 (or 45) degrees for full 10 secs   6a. Motor Leg, Left 1-->Drift, leg falls by the end of the 5-sec period but does not hit bed   6b. Motor Leg, right 0-->No drift, leg holds 30 degree position for full 5 secs   7.   Limb Ataxia 0-->Absent   8.   Sensory 0-->Normal, no sensory loss   9.   Best Language 0-->No aphasia, normal   10. Dysarthria 1-->Mild-to-moderate dysarthria, patient slurs at least some words and, at worst, can be understood with some difficulty   11. Extinction and Inattention  0-->No abnormality   Total 4 (10/18/21 1328)       Modified Suma Score (Pre-morbid)  1-No significant disability despite symptoms    Imaging  I personally reviewed all imaging; relevant findings per HPI.    Labs Data   CBC  Recent Labs   Lab 10/18/21  0610 10/17/21  1610   WBC 8.2 9.5   RBC 4.47 4.22   HGB 13.2 12.9   HCT 40.8 38.8    252     Basic Metabolic Panel   Recent Labs   Lab 10/18/21  0610 10/17/21  1610    140   POTASSIUM 3.7 3.7   CHLORIDE 107 105   CO2 25 26   BUN 12 14   CR 0.69 0.71   * 131*   HAKEEM 10.0 10.1     Liver Panel  No results for input(s): PROTTOTAL, ALBUMIN, BILITOTAL, ALKPHOS, AST, ALT, BILIDIRECT in the last 168 hours.  INR    Recent Labs   Lab Test 10/17/21  1610   INR 0.98      Lipid Profile    Recent Labs   Lab Test 10/18/21  0610 08/10/20  1206 10/17/18  1609   CHOL 125 136 256*   HDL 42 42 41   LDL 41 38 Cannot estimate LDL when triglyceride exceeds 400 mg/dL   TRIG 209* 279* 719*     A1C    Recent Labs   Lab Test 10/18/21  0610 08/10/20  1206 12/13/19  1032   A1C 7.6* 7.4* 8.5*     Troponin I    Recent Labs   Lab 10/18/21  0610 10/17/21  1610   [facility] 4.1 3.0          Stroke Consult Data Data Telestroke Service Details  (for non-emergent stroke  consult with tele)  Video start time 10/18/21   1035   Video end time 10/18/21   1050   Type of service telemedicine diagnostic assessment of acute neurological changes   Reason telemedicine is appropriate patient requires assessment with a specialist for diagnosis and treatment of neurological symptoms   Mode of transmission secure interactive audio and video communication per Rocio   Originating site (patient location) [facility]    Distant site (provider location) [facility]       I have personally spent a total of 50 minutes providing care and consulting with this patient's medical providers today, with more than 50% of this time spent in consultation, coordination of care, and discussion with the patient and/or family regarding diagnostic results, prognosis, symptom management, risks and benefits of management options, and development of plan of care.

## 2021-10-18 NOTE — ASSESSMENT & PLAN NOTE
Taking lisinopril, blood pressures somewhat high  Permissive hypertension for now, prn hydralazine   At discharge restart home meds, follow-up with PCP

## 2021-10-18 NOTE — PLAN OF CARE
Pt admitted for CVA. LS clear. Anxious, requiring frequent reassurance but  has been cooperative. Neuros unchanged. Continues to have left sided weakness. Slight facial droop on left side. Gait is unsteady, ataxia. VSS. BP hypertensive. Slept through the nights without issue.     BP (!) (P) 174/57   Pulse (P) 63   Temp (P) 98.6  F (37  C) (Tympanic)   Resp (P) 16   Wt 85.6 kg (188 lb 11.2 oz)   LMP  (LMP Unknown)   SpO2 (P) 93%   BMI 31.40 kg/m

## 2021-10-18 NOTE — PROGRESS NOTES
NS ADMISSION NOTE    Patient admitted to room 312 at approximately 2015 via wheel chair from emergency room. Patient was accompanied by nurse.     Verbal SBAR report received from ESTRELLITA Wynne prior to patient arrival.     Patient trasferred to bed via self. Patient alert and oriented X 3. The patient is not having any pain.  . Admission vital signs: Blood pressure (!) (P) 174/57, pulse (P) 63, temperature (P) 98.6  F (37  C), temperature source (P) Tympanic, resp. rate (P) 16, weight 85.6 kg (188 lb 11.2 oz), SpO2 (P) 93 %, not currently breastfeeding. Patient was oriented to plan of care, call light, bed controls, tv, telephone, bathroom and visiting hours.     Risk Assessment    The following safety risks were identified during admission: fall and aspiration. Yellow risk band applied: YES.     Skin Initial Assessment    This writer admitted this patient and completed a full skin assessment and Ajith score in the Adult PCS flowsheet. Appropriate interventions initiated as needed.     Ajith Risk Assessment  Sensory Perception: 4-->no impairment  Moisture: 4-->rarely moist  Activity: 4-->walks frequently  Mobility: 3-->slightly limited  Nutrition: 3-->adequate  Friction and Shear: 3-->no apparent problem  Ajith Score: 21  Mattress: Standard Hospital Mattress (Foam)  Bed Frame: Standard width and length      Olga Cochran RN
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
## 2021-10-18 NOTE — ASSESSMENT & PLAN NOTE
Noted on imaging,  Nodule in right posterior lung, possibly calcified  OP surveilence, follow-up with PCP

## 2021-10-19 ENCOUNTER — PATIENT OUTREACH (OUTPATIENT)
Dept: CARE COORDINATION | Facility: CLINIC | Age: 66
End: 2021-10-19

## 2021-10-19 NOTE — PROGRESS NOTES
Chart assessed to verify patient information for RT for scheduling  Eunice Olivares RN on 10/19/2021 at 1:45 PM

## 2021-10-19 NOTE — PROGRESS NOTES
10/18/21 1400   Quick Adds   Type of Visit Initial PT Evaluation   Living Environment   People in home spouse   Current Living Arrangements house   Home Accessibility no concerns   Transportation Anticipated family or friend will provide   Self-Care   Usual Activity Tolerance good   Current Activity Tolerance good   Regular Exercise No   Equipment Currently Used at Home cane, straight   Disability/Function   Hearing Difficulty or Deaf no   Wear Glasses or Blind no   Concentrating, Remembering or Making Decisions Difficulty no   Difficulty Communicating no   Difficulty Eating/Swallowing no   Walking or Climbing Stairs Difficulty yes   Walking or Climbing Stairs ambulation difficulty, requires equipment   Dressing/Bathing Difficulty no   Toileting issues no   Doing Errands Independently Difficulty (such as shopping) no   Fall history within last six months no   Change in Functional Status Since Onset of Current Illness/Injury no   General Information   Referring Physician Mayra   Patient/Family Therapy Goals Statement (PT) return home   Existing Precautions/Restrictions fall   Weight-Bearing Status - LLE full weight-bearing   Weight-Bearing Status - RLE full weight-bearing   Cognition   Orientation Status (Cognition) oriented x 4   Affect/Mental Status (Cognition) WFL   Pain Assessment   Patient Currently in Pain No   Integumentary/Edema   Integumentary/Edema no deficits were identifed   Posture    Posture Not impaired   Range of Motion (ROM)   ROM Quick Adds ROM WFL   Strength   Manual Muscle Testing Quick Adds Strength WFL   Bed Mobility   Comment (Bed Mobility) SBA with bed mobilities   Transfers   Transfer Safety Comments CGA to SBA with SE cane   Gait/Stairs (Locomotion)   [place] Level (Gait) supervision   Assistive Device (Gait) cane, straight   Distance in Feet (Required for LE Total Joints) 150   Pattern (Gait) 3-point   Balance   Balance Comments good with SE cane   Sensory Examination   Sensory  Perception WFL   Coordination   Coordination no deficits were identified   Muscle Tone   Muscle Tone no deficits were identified   Clinical Impression   Criteria for Skilled Therapeutic Intervention evaluation only   PT Diagnosis (PT) impaired mobility   Influenced by the following impairments fatigue   Functional limitations due to impairments activity/gait tolerance   Clinical Presentation Stable/Uncomplicated   Clinical Decision Making (Complexity) low complexity   Therapy Frequency (PT) One time eval and treatment only   Anticipated Equipment Needs at Discharge (PT) cane, straight   Risk & Benefits of therapy have been explained risks/benefits reviewed;patient   PT Discharge Planning    PT Discharge Recommendation (DC Rec) home with assist   PT Rationale for DC Rec patient appearing near baseline mobility   PT Brief overview of current status  CGA to SBA for all mobilities using a SE cane   Total Evaluation Time   Total Evaluation Time (Minutes) 15

## 2021-10-19 NOTE — PROGRESS NOTES
Clinic Care Coordination Contact  No TCM call required, Primary care provider elsewhere.  Kassie Prather RN on 10/19/2021 at 8:52 AM
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
## 2021-10-20 ENCOUNTER — HOSPITAL ENCOUNTER (OUTPATIENT)
Dept: CARDIOLOGY | Facility: OTHER | Age: 66
End: 2021-10-20
Attending: FAMILY MEDICINE
Payer: COMMERCIAL

## 2021-10-20 DIAGNOSIS — I63.9 CEREBROVASCULAR ACCIDENT (CVA), UNSPECIFIED MECHANISM (H): ICD-10-CM

## 2021-10-20 PROCEDURE — 999N000157 HC STATISTIC RCP TIME EA 10 MIN

## 2021-10-20 PROCEDURE — 93228 REMOTE 30 DAY ECG REV/REPORT: CPT | Performed by: INTERNAL MEDICINE

## 2021-10-20 NOTE — PATIENT INSTRUCTIONS
Date Placed on Pt:  10/20/21    Patient was given MCOT device and instructed on:    -Keeping monitor and sensor within 30 feet of each other at all times  -How to record a sympton manually  -To remove patch at 5-7 days to charge sensor and reapply patch.  -How to remove sensor from original patch and to place in new patch.  -How to charge sensor and monitor  -How to move through the screens on the monitor  -When to trigger a mary event  -How to return devices (FedEx/UPS)  -How long to wear device  -Who to call with questions  -Not to swim or take a bath with device on  -Patient instructed to wear for 30 days

## 2022-02-24 NOTE — NURSING NOTE
"Chief Complaint   Patient presents with     Annual Visit     diabetic foot check       Initial /54 (BP Location: Right arm, Patient Position: Sitting, Cuff Size: Adult Regular)   Pulse 85   Temp 98.1  F (36.7  C) (Tympanic)   Wt 89.8 kg (198 lb)   SpO2 97%   BMI 32.95 kg/m   Estimated body mass index is 32.95 kg/m  as calculated from the following:    Height as of 4/1/19: 1.651 m (5' 5\").    Weight as of this encounter: 89.8 kg (198 lb).  Medication Reconciliation: complete    Kamilla Roper LPN  " Normal thyroid function test.

## 2022-11-10 NOTE — TELEPHONE ENCOUNTER
"Patient presented to the [facility] check in window stating she needs a refill of her gabapentin. This writer informed patient that she should still have refills left of the original rx placed on 02/08/2019. Patient states, \"that don't matter, I am out and I want more\". This writer informed patient that this would need to be looked into, before proceeding. Patient then left.     Kelly Cat LPN............. January 9, 2020 2:13 PM     "
After proper verification, patient was relayed the message from below.  Genoveva Vital LPN on 1/10/2020 at 9:48 AM    
Left message for patient to return call.  Genoveva Vital LPN on 1/10/2020 at 9:12 AM    
New prescription sent in that should be good until October when she is due for her annual physical exam at which time we will try to get all of her meds on one schedule.  
This writer proceeded to call [facility], as the original prescription was sent there for 180 tabs (1 month supply) with 11 additional refills (allowing patient to fill until 02/08/2020). [facility] states that patient had the rx transferred out to [facility] retail pharmacy on 07/19/2019.    Kelly Cat LPN............. January 9, 2020 2:15 PM     
This writer then proceeded to call [facility] retail pharmacy. [facility] did confirm that rx was transferred to them on 07/19/19 with an original supply of 1,102 tabs left. According to their records, patient has filled 1,080/1102 tabs, which only leaves 22 tabs left. Please advise.     Kelly Cat LPN............. January 9, 2020 2:17 PM     
Ambulatory

## 2023-03-19 ENCOUNTER — NURSE TRIAGE (OUTPATIENT)
Dept: NURSING | Facility: CLINIC | Age: 68
End: 2023-03-19
Payer: COMMERCIAL

## 2023-03-19 NOTE — TELEPHONE ENCOUNTER
Radha calling concerned she is having 3+ watery stool since last night. Afebrile. Denies pain. Care advice is to treat at home. Diet information given. Agreed to call back if symptoms worsen.   Gemma Kuhn RN on 3/19/2023 at 3:31 PM      Reason for Disposition    MILD-MODERATE diarrhea (e.g., 1-6 times / day more than normal)    Additional Information    Negative: Shock suspected (e.g., cold/pale/clammy skin, too weak to stand, low BP, rapid pulse)    Negative: Difficult to awaken or acting confused (e.g., disoriented, slurred speech)    Negative: Sounds like a life-threatening emergency to the triager    Negative: [1] SEVERE abdominal pain (e.g., excruciating) AND [2] present > 1 hour    Negative: [1] SEVERE abdominal pain AND [2] age > 60 years    Negative: [1] Blood in the stool AND [2] moderate or large amount of blood    Negative: Black or tarry bowel movements (Exception: chronic-unchanged black-grey bowel movements AND is taking iron pills or Pepto-bismol)    Negative: [1] Drinking very little AND [2] dehydration suspected (e.g., no urine > 12 hours, very dry mouth, very lightheaded)    Negative: Patient sounds very sick or weak to the triager    Negative: [1] SEVERE diarrhea (e.g., 7 or more times / day more than normal) AND [2] age > 60 years    Negative: [1] Constant abdominal pain AND [2] present > 2 hours    Negative: [1] Fever > 103 F (39.4 C) AND [2] not able to get the fever down using Fever Care Advice    Negative: [1] SEVERE diarrhea (e.g., 7 or more times / day more than normal) AND [2] present > 24 hours (1 day)    Negative: [1] MODERATE diarrhea (e.g., 4-6 times / day more than normal) AND [2] present > 48 hours (2 days)    Negative: [1] MODERATE diarrhea (e.g., 4-6 times / day more than normal) AND [2] age > 70 years    Negative: Fever > 101 F (38.3 C)    Negative: Fever present > 3 days (72 hours)    Negative: Abdominal pain  (Exception: Pain clears with each passage of diarrhea stool)     Negative: [1] Blood in the stool AND [2] small amount of blood   (Exception: only on toilet paper. Reason: diarrhea can cause rectal irritation with blood on wiping)    Negative: [1] Mucus or pus in stool AND [2] present > 2 days AND [3] diarrhea is more than mild    Negative: [1] Recent antibiotic therapy (i.e., within last 2 months) AND [2] diarrhea present > 3 days since antibiotic was stopped    Negative: [1] Recent hospitalization AND [2] diarrhea present > 3 days    Negative: Weak immune system (e.g., HIV positive, cancer chemo, splenectomy, organ transplant, chronic steroids)    Negative: Tube feedings (e.g., nasogastric, g-tube, j-tube)    Negative: Travel to a foreign country in past month    Negative: [1] MILD diarrhea (e.g., 1-3 or more stools than normal in past 24 hours) without known cause AND [2] present >  7 days    Negative: Diarrhea is a chronic symptom (recurrent or ongoing AND present > 4 weeks)    Negative: SEVERE diarrhea (e.g., 7 or more times / day more than normal)    Protocols used: [id number]-

## 2023-08-29 ENCOUNTER — LAB REQUISITION (OUTPATIENT)
Dept: LAB | Facility: OTHER | Age: 68
End: 2023-08-29
Payer: COMMERCIAL

## 2023-08-29 DIAGNOSIS — E11.9 TYPE 2 DIABETES MELLITUS WITHOUT COMPLICATIONS (H): ICD-10-CM

## 2023-08-29 LAB
ALBUMIN UR-MCNC: 10 MG/DL
APPEARANCE UR: CLEAR
BILIRUB UR QL STRIP: NEGATIVE
COLOR UR AUTO: ABNORMAL
CREAT UR-MCNC: 49 MG/DL
GLUCOSE UR STRIP-MCNC: 70 MG/DL
HGB UR QL STRIP: NEGATIVE
KETONES UR STRIP-MCNC: NEGATIVE MG/DL
LEUKOCYTE ESTERASE UR QL STRIP: NEGATIVE
MICROALBUMIN UR-MCNC: 77.9 MG/L
MICROALBUMIN/CREAT UR: 158.98 MG/G CR (ref 0–25)
NITRATE UR QL: NEGATIVE
PH UR STRIP: 6.5 [PH] (ref 5–9)
RBC URINE: 0 /HPF
SP GR UR STRIP: 1.01 (ref 1–1.03)
UROBILINOGEN UR STRIP-MCNC: NORMAL MG/DL
WBC URINE: 3 /HPF

## 2023-08-29 PROCEDURE — 82570 ASSAY OF URINE CREATININE: CPT | Performed by: NURSE PRACTITIONER

## 2023-08-29 PROCEDURE — 81001 URINALYSIS AUTO W/SCOPE: CPT | Performed by: NURSE PRACTITIONER

## 2023-09-12 ENCOUNTER — LAB REQUISITION (OUTPATIENT)
Dept: LAB | Facility: OTHER | Age: 68
End: 2023-09-12
Payer: COMMERCIAL

## 2023-09-12 DIAGNOSIS — R60.0 LOCALIZED EDEMA: ICD-10-CM

## 2023-09-12 DIAGNOSIS — I10 ESSENTIAL (PRIMARY) HYPERTENSION: ICD-10-CM

## 2023-09-12 LAB
ANION GAP SERPL CALCULATED.3IONS-SCNC: 11 MMOL/L (ref 7–15)
BUN SERPL-MCNC: 17.4 MG/DL (ref 8–23)
CALCIUM SERPL-MCNC: 10.3 MG/DL (ref 8.8–10.2)
CHLORIDE SERPL-SCNC: 97 MMOL/L (ref 98–107)
CREAT SERPL-MCNC: 0.61 MG/DL (ref 0.51–0.95)
DEPRECATED HCO3 PLAS-SCNC: 28 MMOL/L (ref 22–29)
EGFRCR SERPLBLD CKD-EPI 2021: >90 ML/MIN/1.73M2
GLUCOSE SERPL-MCNC: 158 MG/DL (ref 70–99)
POTASSIUM SERPL-SCNC: 4.1 MMOL/L (ref 3.4–5.3)
SODIUM SERPL-SCNC: 136 MMOL/L (ref 136–145)

## 2023-09-12 PROCEDURE — 80048 BASIC METABOLIC PNL TOTAL CA: CPT | Performed by: NURSE PRACTITIONER

## 2023-11-17 ENCOUNTER — TRANSFERRED RECORDS (OUTPATIENT)
Dept: HEALTH INFORMATION MANAGEMENT | Facility: OTHER | Age: 68
End: 2023-11-17
Payer: COMMERCIAL

## 2023-11-17 LAB — RETINOPATHY: NEGATIVE

## (undated) RX ORDER — ASPIRIN 81 MG/1
TABLET, CHEWABLE ORAL
Status: DISPENSED
Start: 2021-10-17

## (undated) RX ORDER — CLOPIDOGREL BISULFATE 75 MG/1
TABLET ORAL
Status: DISPENSED
Start: 2021-10-17